# Patient Record
Sex: FEMALE | Race: BLACK OR AFRICAN AMERICAN | NOT HISPANIC OR LATINO | URBAN - METROPOLITAN AREA
[De-identification: names, ages, dates, MRNs, and addresses within clinical notes are randomized per-mention and may not be internally consistent; named-entity substitution may affect disease eponyms.]

---

## 2017-01-18 ENCOUNTER — COMMUNICATION - HEALTHEAST (OUTPATIENT)
Dept: INTERNAL MEDICINE | Facility: CLINIC | Age: 62
End: 2017-01-18

## 2017-01-23 ENCOUNTER — RECORDS - HEALTHEAST (OUTPATIENT)
Dept: ADMINISTRATIVE | Facility: OTHER | Age: 62
End: 2017-01-23

## 2017-01-31 ENCOUNTER — COMMUNICATION - HEALTHEAST (OUTPATIENT)
Dept: INTERNAL MEDICINE | Facility: CLINIC | Age: 62
End: 2017-01-31

## 2017-01-31 ENCOUNTER — OFFICE VISIT - HEALTHEAST (OUTPATIENT)
Dept: INTERNAL MEDICINE | Facility: CLINIC | Age: 62
End: 2017-01-31

## 2017-01-31 DIAGNOSIS — J20.9 SUBACUTE BRONCHITIS: ICD-10-CM

## 2017-01-31 DIAGNOSIS — R05.9 COUGH: ICD-10-CM

## 2017-01-31 ASSESSMENT — MIFFLIN-ST. JEOR: SCORE: 971.33

## 2017-05-31 ENCOUNTER — COMMUNICATION - HEALTHEAST (OUTPATIENT)
Dept: INTERNAL MEDICINE | Facility: CLINIC | Age: 62
End: 2017-05-31

## 2017-06-26 ENCOUNTER — OFFICE VISIT - HEALTHEAST (OUTPATIENT)
Dept: INTERNAL MEDICINE | Facility: CLINIC | Age: 62
End: 2017-06-26

## 2017-06-26 DIAGNOSIS — L98.9 SKIN LESION: ICD-10-CM

## 2017-06-26 DIAGNOSIS — R09.81 NASAL CONGESTION: ICD-10-CM

## 2017-06-26 DIAGNOSIS — M54.9 BACK PAIN: ICD-10-CM

## 2017-06-26 ASSESSMENT — MIFFLIN-ST. JEOR: SCORE: 966.8

## 2017-06-27 ENCOUNTER — COMMUNICATION - HEALTHEAST (OUTPATIENT)
Dept: INTERNAL MEDICINE | Facility: CLINIC | Age: 62
End: 2017-06-27

## 2017-06-28 ENCOUNTER — COMMUNICATION - HEALTHEAST (OUTPATIENT)
Dept: INTERNAL MEDICINE | Facility: CLINIC | Age: 62
End: 2017-06-28

## 2017-06-29 ENCOUNTER — COMMUNICATION - HEALTHEAST (OUTPATIENT)
Dept: INTERNAL MEDICINE | Facility: CLINIC | Age: 62
End: 2017-06-29

## 2017-07-05 ENCOUNTER — HOSPITAL ENCOUNTER (OUTPATIENT)
Dept: MRI IMAGING | Facility: CLINIC | Age: 62
Discharge: HOME | End: 2017-07-05
Attending: INTERNAL MEDICINE

## 2017-07-05 ENCOUNTER — COMMUNICATION - HEALTHEAST (OUTPATIENT)
Dept: INTERNAL MEDICINE | Facility: CLINIC | Age: 62
End: 2017-07-05

## 2017-07-05 DIAGNOSIS — M54.9 BACK PAIN: ICD-10-CM

## 2017-07-06 ENCOUNTER — AMBULATORY - HEALTHEAST (OUTPATIENT)
Dept: INTERNAL MEDICINE | Facility: CLINIC | Age: 62
End: 2017-07-06

## 2017-07-06 DIAGNOSIS — M54.9 BACK PAIN: ICD-10-CM

## 2017-07-10 ENCOUNTER — HOSPITAL ENCOUNTER (OUTPATIENT)
Dept: PHYSICAL MEDICINE AND REHAB | Facility: CLINIC | Age: 62
Discharge: HOME | End: 2017-07-10
Attending: INTERNAL MEDICINE

## 2017-07-10 DIAGNOSIS — G89.29 CHRONIC RIGHT SI JOINT PAIN: ICD-10-CM

## 2017-07-10 DIAGNOSIS — M79.18 MYOFASCIAL PAIN: ICD-10-CM

## 2017-07-10 DIAGNOSIS — M54.16 LUMBAR RADICULITIS: ICD-10-CM

## 2017-07-10 DIAGNOSIS — M54.50 LUMBALGIA: ICD-10-CM

## 2017-07-10 DIAGNOSIS — M53.3 CHRONIC RIGHT SI JOINT PAIN: ICD-10-CM

## 2017-07-10 DIAGNOSIS — G47.9 SLEEP DISTURBANCE: ICD-10-CM

## 2017-07-10 ASSESSMENT — MIFFLIN-ST. JEOR: SCORE: 970.88

## 2017-07-11 ENCOUNTER — RECORDS - HEALTHEAST (OUTPATIENT)
Dept: ADMINISTRATIVE | Facility: OTHER | Age: 62
End: 2017-07-11

## 2017-07-19 ENCOUNTER — COMMUNICATION - HEALTHEAST (OUTPATIENT)
Dept: TELEHEALTH | Facility: CLINIC | Age: 62
End: 2017-07-19

## 2017-07-19 ENCOUNTER — HOSPITAL ENCOUNTER (OUTPATIENT)
Dept: MAMMOGRAPHY | Facility: CLINIC | Age: 62
Discharge: HOME | End: 2017-07-19
Attending: INTERNAL MEDICINE

## 2017-07-19 ENCOUNTER — COMMUNICATION - HEALTHEAST (OUTPATIENT)
Dept: INTERNAL MEDICINE | Facility: CLINIC | Age: 62
End: 2017-07-19

## 2017-07-19 DIAGNOSIS — Z12.31 VISIT FOR SCREENING MAMMOGRAM: ICD-10-CM

## 2017-07-25 ENCOUNTER — OFFICE VISIT - HEALTHEAST (OUTPATIENT)
Dept: PHYSICAL THERAPY | Facility: REHABILITATION | Age: 62
End: 2017-07-25

## 2017-07-25 DIAGNOSIS — M53.86 DECREASED ROM OF LUMBAR SPINE: ICD-10-CM

## 2017-07-25 DIAGNOSIS — M54.41 ACUTE RIGHT-SIDED LOW BACK PAIN WITH RIGHT-SIDED SCIATICA: ICD-10-CM

## 2017-08-02 ENCOUNTER — OFFICE VISIT - HEALTHEAST (OUTPATIENT)
Dept: PHYSICAL THERAPY | Facility: REHABILITATION | Age: 62
End: 2017-08-02

## 2017-08-02 DIAGNOSIS — M53.86 DECREASED ROM OF LUMBAR SPINE: ICD-10-CM

## 2017-08-02 DIAGNOSIS — M54.41 ACUTE RIGHT-SIDED LOW BACK PAIN WITH RIGHT-SIDED SCIATICA: ICD-10-CM

## 2017-08-07 ENCOUNTER — HOSPITAL ENCOUNTER (OUTPATIENT)
Dept: PHYSICAL MEDICINE AND REHAB | Facility: CLINIC | Age: 62
Discharge: HOME | End: 2017-08-07
Attending: PHYSICIAN ASSISTANT

## 2017-08-07 ENCOUNTER — COMMUNICATION - HEALTHEAST (OUTPATIENT)
Dept: PHYSICAL MEDICINE AND REHAB | Facility: CLINIC | Age: 62
End: 2017-08-07

## 2017-08-07 DIAGNOSIS — M53.3 CHRONIC RIGHT SI JOINT PAIN: ICD-10-CM

## 2017-08-07 DIAGNOSIS — M54.16 LUMBAR RADICULITIS: ICD-10-CM

## 2017-08-07 DIAGNOSIS — G47.9 SLEEP DISTURBANCE: ICD-10-CM

## 2017-08-07 DIAGNOSIS — G89.29 CHRONIC RIGHT SI JOINT PAIN: ICD-10-CM

## 2017-08-07 DIAGNOSIS — M79.18 MYOFASCIAL PAIN: ICD-10-CM

## 2017-08-07 DIAGNOSIS — M54.50 LUMBALGIA: ICD-10-CM

## 2017-08-07 ASSESSMENT — MIFFLIN-ST. JEOR: SCORE: 965.89

## 2017-09-07 ENCOUNTER — COMMUNICATION - HEALTHEAST (OUTPATIENT)
Dept: INTERNAL MEDICINE | Facility: CLINIC | Age: 62
End: 2017-09-07

## 2018-01-25 ENCOUNTER — COMMUNICATION - HEALTHEAST (OUTPATIENT)
Dept: INTERNAL MEDICINE | Facility: CLINIC | Age: 63
End: 2018-01-25

## 2018-01-25 ENCOUNTER — OFFICE VISIT - HEALTHEAST (OUTPATIENT)
Dept: INTERNAL MEDICINE | Facility: CLINIC | Age: 63
End: 2018-01-25

## 2018-01-25 DIAGNOSIS — M54.9 BACK PAIN: ICD-10-CM

## 2018-01-25 DIAGNOSIS — R10.30 GROIN PAIN: ICD-10-CM

## 2018-01-25 ASSESSMENT — MIFFLIN-ST. JEOR: SCORE: 980.41

## 2018-01-29 ENCOUNTER — COMMUNICATION - HEALTHEAST (OUTPATIENT)
Dept: INTERNAL MEDICINE | Facility: CLINIC | Age: 63
End: 2018-01-29

## 2018-01-29 ENCOUNTER — HOSPITAL ENCOUNTER (OUTPATIENT)
Dept: PHYSICAL MEDICINE AND REHAB | Facility: CLINIC | Age: 63
Discharge: HOME | End: 2018-01-29
Attending: PHYSICIAN ASSISTANT

## 2018-01-29 DIAGNOSIS — M54.16 LUMBAR RADICULITIS: ICD-10-CM

## 2018-01-29 DIAGNOSIS — M79.18 MYOFASCIAL PAIN: ICD-10-CM

## 2018-01-29 DIAGNOSIS — G89.29 CHRONIC RIGHT SI JOINT PAIN: ICD-10-CM

## 2018-01-29 DIAGNOSIS — M54.50 LUMBALGIA: ICD-10-CM

## 2018-01-29 DIAGNOSIS — M53.3 CHRONIC RIGHT SI JOINT PAIN: ICD-10-CM

## 2018-01-31 ENCOUNTER — COMMUNICATION - HEALTHEAST (OUTPATIENT)
Dept: SCHEDULING | Facility: CLINIC | Age: 63
End: 2018-01-31

## 2018-02-06 ENCOUNTER — HOSPITAL ENCOUNTER (OUTPATIENT)
Dept: PHYSICAL MEDICINE AND REHAB | Facility: CLINIC | Age: 63
Discharge: HOME | End: 2018-02-06
Attending: PHYSICIAN ASSISTANT

## 2018-02-06 DIAGNOSIS — M79.18 MYOFASCIAL PAIN: ICD-10-CM

## 2018-02-06 DIAGNOSIS — M54.16 LUMBAR RADICULITIS: ICD-10-CM

## 2018-02-06 DIAGNOSIS — G89.29 CHRONIC RIGHT SI JOINT PAIN: ICD-10-CM

## 2018-02-06 DIAGNOSIS — M54.50 LUMBALGIA: ICD-10-CM

## 2018-02-06 DIAGNOSIS — M53.3 CHRONIC RIGHT SI JOINT PAIN: ICD-10-CM

## 2018-02-19 ENCOUNTER — COMMUNICATION - HEALTHEAST (OUTPATIENT)
Dept: PHYSICAL MEDICINE AND REHAB | Facility: CLINIC | Age: 63
End: 2018-02-19

## 2018-02-19 ENCOUNTER — HOSPITAL ENCOUNTER (OUTPATIENT)
Dept: PHYSICAL MEDICINE AND REHAB | Facility: CLINIC | Age: 63
Discharge: HOME | End: 2018-02-19
Attending: PHYSICIAN ASSISTANT

## 2018-02-19 DIAGNOSIS — G47.9 SLEEP DISTURBANCE: ICD-10-CM

## 2018-02-19 DIAGNOSIS — M54.16 LUMBAR RADICULITIS: ICD-10-CM

## 2018-02-19 DIAGNOSIS — G89.29 CHRONIC RIGHT SI JOINT PAIN: ICD-10-CM

## 2018-02-19 DIAGNOSIS — M79.18 MYOFASCIAL PAIN: ICD-10-CM

## 2018-02-19 DIAGNOSIS — M54.50 LUMBALGIA: ICD-10-CM

## 2018-02-19 DIAGNOSIS — M53.3 CHRONIC RIGHT SI JOINT PAIN: ICD-10-CM

## 2018-02-19 ASSESSMENT — MIFFLIN-ST. JEOR: SCORE: 980.41

## 2018-02-21 ENCOUNTER — OFFICE VISIT - HEALTHEAST (OUTPATIENT)
Dept: PHYSICAL THERAPY | Facility: REHABILITATION | Age: 63
End: 2018-02-21

## 2018-02-21 DIAGNOSIS — M62.81 GENERALIZED MUSCLE WEAKNESS: ICD-10-CM

## 2018-02-21 DIAGNOSIS — M25.552 BILATERAL HIP PAIN: ICD-10-CM

## 2018-02-21 DIAGNOSIS — M25.551 BILATERAL HIP PAIN: ICD-10-CM

## 2018-02-21 DIAGNOSIS — M53.3 PAIN OF RIGHT SACROILIAC JOINT: ICD-10-CM

## 2018-02-28 ENCOUNTER — OFFICE VISIT - HEALTHEAST (OUTPATIENT)
Dept: PHYSICAL THERAPY | Facility: REHABILITATION | Age: 63
End: 2018-02-28

## 2018-02-28 DIAGNOSIS — M62.81 GENERALIZED MUSCLE WEAKNESS: ICD-10-CM

## 2018-02-28 DIAGNOSIS — M54.41 ACUTE RIGHT-SIDED LOW BACK PAIN WITH RIGHT-SIDED SCIATICA: ICD-10-CM

## 2018-02-28 DIAGNOSIS — M53.86 DECREASED ROM OF LUMBAR SPINE: ICD-10-CM

## 2018-02-28 DIAGNOSIS — M25.551 BILATERAL HIP PAIN: ICD-10-CM

## 2018-02-28 DIAGNOSIS — M25.552 BILATERAL HIP PAIN: ICD-10-CM

## 2018-02-28 DIAGNOSIS — M53.3 PAIN OF RIGHT SACROILIAC JOINT: ICD-10-CM

## 2018-03-13 ENCOUNTER — OFFICE VISIT - HEALTHEAST (OUTPATIENT)
Dept: PHYSICAL THERAPY | Facility: REHABILITATION | Age: 63
End: 2018-03-13

## 2018-03-13 DIAGNOSIS — M25.552 BILATERAL HIP PAIN: ICD-10-CM

## 2018-03-13 DIAGNOSIS — M53.3 PAIN OF RIGHT SACROILIAC JOINT: ICD-10-CM

## 2018-03-13 DIAGNOSIS — M25.551 BILATERAL HIP PAIN: ICD-10-CM

## 2018-03-13 DIAGNOSIS — M53.86 DECREASED ROM OF LUMBAR SPINE: ICD-10-CM

## 2018-03-13 DIAGNOSIS — M62.81 GENERALIZED MUSCLE WEAKNESS: ICD-10-CM

## 2018-03-13 DIAGNOSIS — M54.41 ACUTE RIGHT-SIDED LOW BACK PAIN WITH RIGHT-SIDED SCIATICA: ICD-10-CM

## 2018-03-15 ENCOUNTER — OFFICE VISIT - HEALTHEAST (OUTPATIENT)
Dept: PHYSICAL THERAPY | Facility: REHABILITATION | Age: 63
End: 2018-03-15

## 2018-03-15 DIAGNOSIS — M62.81 GENERALIZED MUSCLE WEAKNESS: ICD-10-CM

## 2018-03-15 DIAGNOSIS — M53.3 PAIN OF RIGHT SACROILIAC JOINT: ICD-10-CM

## 2018-03-15 DIAGNOSIS — M53.86 DECREASED ROM OF LUMBAR SPINE: ICD-10-CM

## 2018-03-15 DIAGNOSIS — M54.41 ACUTE RIGHT-SIDED LOW BACK PAIN WITH RIGHT-SIDED SCIATICA: ICD-10-CM

## 2018-03-15 DIAGNOSIS — M25.551 BILATERAL HIP PAIN: ICD-10-CM

## 2018-03-15 DIAGNOSIS — M25.552 BILATERAL HIP PAIN: ICD-10-CM

## 2018-03-20 ENCOUNTER — OFFICE VISIT - HEALTHEAST (OUTPATIENT)
Dept: PHYSICAL THERAPY | Facility: REHABILITATION | Age: 63
End: 2018-03-20

## 2018-03-20 DIAGNOSIS — M62.81 GENERALIZED MUSCLE WEAKNESS: ICD-10-CM

## 2018-03-20 DIAGNOSIS — M53.3 PAIN OF RIGHT SACROILIAC JOINT: ICD-10-CM

## 2018-03-20 DIAGNOSIS — M25.551 BILATERAL HIP PAIN: ICD-10-CM

## 2018-03-20 DIAGNOSIS — M25.552 BILATERAL HIP PAIN: ICD-10-CM

## 2018-03-20 DIAGNOSIS — M53.86 DECREASED ROM OF LUMBAR SPINE: ICD-10-CM

## 2018-03-20 DIAGNOSIS — M54.41 ACUTE RIGHT-SIDED LOW BACK PAIN WITH RIGHT-SIDED SCIATICA: ICD-10-CM

## 2018-03-27 ENCOUNTER — OFFICE VISIT - HEALTHEAST (OUTPATIENT)
Dept: PHYSICAL THERAPY | Facility: REHABILITATION | Age: 63
End: 2018-03-27

## 2018-03-27 DIAGNOSIS — M54.41 ACUTE RIGHT-SIDED LOW BACK PAIN WITH RIGHT-SIDED SCIATICA: ICD-10-CM

## 2018-03-27 DIAGNOSIS — M25.551 BILATERAL HIP PAIN: ICD-10-CM

## 2018-03-27 DIAGNOSIS — M62.81 GENERALIZED MUSCLE WEAKNESS: ICD-10-CM

## 2018-03-27 DIAGNOSIS — M25.552 BILATERAL HIP PAIN: ICD-10-CM

## 2018-03-27 DIAGNOSIS — R92.8 ABNORMAL MAMMOGRAM: ICD-10-CM

## 2018-03-27 DIAGNOSIS — M53.3 PAIN OF RIGHT SACROILIAC JOINT: ICD-10-CM

## 2018-03-27 DIAGNOSIS — M53.86 DECREASED ROM OF LUMBAR SPINE: ICD-10-CM

## 2018-10-03 ENCOUNTER — COMMUNICATION - HEALTHEAST (OUTPATIENT)
Dept: INTERNAL MEDICINE | Facility: CLINIC | Age: 63
End: 2018-10-03

## 2018-10-04 ENCOUNTER — OFFICE VISIT - HEALTHEAST (OUTPATIENT)
Dept: INTERNAL MEDICINE | Facility: CLINIC | Age: 63
End: 2018-10-04

## 2018-10-04 DIAGNOSIS — Z12.11 SCREEN FOR COLON CANCER: ICD-10-CM

## 2018-10-04 DIAGNOSIS — Z00.00 HEALTHCARE MAINTENANCE: ICD-10-CM

## 2018-10-04 DIAGNOSIS — M54.9 BACK PAIN: ICD-10-CM

## 2018-10-04 LAB
ALBUMIN SERPL-MCNC: 3.7 G/DL (ref 3.5–5)
ALP SERPL-CCNC: 69 U/L (ref 45–120)
ALT SERPL W P-5'-P-CCNC: 23 U/L (ref 0–45)
ANION GAP SERPL CALCULATED.3IONS-SCNC: 9 MMOL/L (ref 5–18)
AST SERPL W P-5'-P-CCNC: 36 U/L (ref 0–40)
BILIRUB SERPL-MCNC: 0.7 MG/DL (ref 0–1)
BUN SERPL-MCNC: 10 MG/DL (ref 8–22)
CALCIUM SERPL-MCNC: 9.2 MG/DL (ref 8.5–10.5)
CHLORIDE BLD-SCNC: 104 MMOL/L (ref 98–107)
CHOLEST SERPL-MCNC: 184 MG/DL
CO2 SERPL-SCNC: 27 MMOL/L (ref 22–31)
CREAT SERPL-MCNC: 0.72 MG/DL (ref 0.6–1.1)
FASTING STATUS PATIENT QL REPORTED: NORMAL
GFR SERPL CREATININE-BSD FRML MDRD: >60 ML/MIN/1.73M2
GLUCOSE BLD-MCNC: 73 MG/DL (ref 70–125)
HDLC SERPL-MCNC: 86 MG/DL
LDLC SERPL CALC-MCNC: 89 MG/DL
POTASSIUM BLD-SCNC: 4 MMOL/L (ref 3.5–5)
PROT SERPL-MCNC: 7.3 G/DL (ref 6–8)
SODIUM SERPL-SCNC: 140 MMOL/L (ref 136–145)
TRIGL SERPL-MCNC: 45 MG/DL

## 2018-10-04 ASSESSMENT — MIFFLIN-ST. JEOR: SCORE: 940.72

## 2018-10-05 ENCOUNTER — COMMUNICATION - HEALTHEAST (OUTPATIENT)
Dept: INTERNAL MEDICINE | Facility: CLINIC | Age: 63
End: 2018-10-05

## 2018-10-10 ENCOUNTER — OFFICE VISIT - HEALTHEAST (OUTPATIENT)
Dept: PHYSICAL THERAPY | Facility: REHABILITATION | Age: 63
End: 2018-10-10

## 2018-10-10 DIAGNOSIS — M54.50 ACUTE RIGHT-SIDED LOW BACK PAIN WITHOUT SCIATICA: ICD-10-CM

## 2018-10-10 DIAGNOSIS — M62.81 GENERALIZED MUSCLE WEAKNESS: ICD-10-CM

## 2018-10-10 DIAGNOSIS — M53.3 SACROILIAC JOINT PAIN: ICD-10-CM

## 2018-10-16 ENCOUNTER — OFFICE VISIT - HEALTHEAST (OUTPATIENT)
Dept: PHYSICAL THERAPY | Facility: REHABILITATION | Age: 63
End: 2018-10-16

## 2018-10-16 DIAGNOSIS — M54.50 ACUTE RIGHT-SIDED LOW BACK PAIN WITHOUT SCIATICA: ICD-10-CM

## 2018-10-16 DIAGNOSIS — M53.3 PAIN OF RIGHT SACROILIAC JOINT: ICD-10-CM

## 2018-10-16 DIAGNOSIS — M25.552 BILATERAL HIP PAIN: ICD-10-CM

## 2018-10-16 DIAGNOSIS — M25.551 BILATERAL HIP PAIN: ICD-10-CM

## 2018-10-16 DIAGNOSIS — M53.3 SACROILIAC JOINT PAIN: ICD-10-CM

## 2018-10-16 DIAGNOSIS — M62.81 GENERALIZED MUSCLE WEAKNESS: ICD-10-CM

## 2018-10-18 ENCOUNTER — OFFICE VISIT - HEALTHEAST (OUTPATIENT)
Dept: PHYSICAL THERAPY | Facility: REHABILITATION | Age: 63
End: 2018-10-18

## 2018-10-18 DIAGNOSIS — M53.3 SACROILIAC JOINT PAIN: ICD-10-CM

## 2018-10-18 DIAGNOSIS — M62.81 GENERALIZED MUSCLE WEAKNESS: ICD-10-CM

## 2018-10-18 DIAGNOSIS — M54.50 ACUTE RIGHT-SIDED LOW BACK PAIN WITHOUT SCIATICA: ICD-10-CM

## 2018-10-19 ENCOUNTER — AMBULATORY - HEALTHEAST (OUTPATIENT)
Dept: INTERNAL MEDICINE | Facility: CLINIC | Age: 63
End: 2018-10-19

## 2018-10-19 ENCOUNTER — AMBULATORY - HEALTHEAST (OUTPATIENT)
Dept: NURSING | Facility: CLINIC | Age: 63
End: 2018-10-19

## 2018-10-19 DIAGNOSIS — Z23 FLU VACCINE NEED: ICD-10-CM

## 2018-10-19 DIAGNOSIS — Z23 NEED FOR VACCINATION: ICD-10-CM

## 2018-10-23 ENCOUNTER — OFFICE VISIT - HEALTHEAST (OUTPATIENT)
Dept: PHYSICAL THERAPY | Facility: REHABILITATION | Age: 63
End: 2018-10-23

## 2018-10-23 DIAGNOSIS — M62.81 GENERALIZED MUSCLE WEAKNESS: ICD-10-CM

## 2018-10-23 DIAGNOSIS — M53.3 PAIN OF RIGHT SACROILIAC JOINT: ICD-10-CM

## 2018-10-23 DIAGNOSIS — M54.50 ACUTE RIGHT-SIDED LOW BACK PAIN WITHOUT SCIATICA: ICD-10-CM

## 2018-10-29 ENCOUNTER — OFFICE VISIT - HEALTHEAST (OUTPATIENT)
Dept: PHYSICAL THERAPY | Facility: REHABILITATION | Age: 63
End: 2018-10-29

## 2018-10-29 DIAGNOSIS — M62.81 GENERALIZED MUSCLE WEAKNESS: ICD-10-CM

## 2018-10-29 DIAGNOSIS — M53.3 PAIN OF RIGHT SACROILIAC JOINT: ICD-10-CM

## 2018-10-29 DIAGNOSIS — M54.50 ACUTE RIGHT-SIDED LOW BACK PAIN WITHOUT SCIATICA: ICD-10-CM

## 2018-11-01 ENCOUNTER — COMMUNICATION - HEALTHEAST (OUTPATIENT)
Dept: INTERNAL MEDICINE | Facility: CLINIC | Age: 63
End: 2018-11-01

## 2019-01-09 ENCOUNTER — OFFICE VISIT - HEALTHEAST (OUTPATIENT)
Dept: INTERNAL MEDICINE | Facility: CLINIC | Age: 64
End: 2019-01-09

## 2019-01-09 DIAGNOSIS — M79.18 MYOFASCIAL PAIN: ICD-10-CM

## 2019-01-09 DIAGNOSIS — R03.0 ELEVATED BLOOD PRESSURE READING WITHOUT DIAGNOSIS OF HYPERTENSION: ICD-10-CM

## 2019-01-09 DIAGNOSIS — J30.2 SEASONAL ALLERGIES: ICD-10-CM

## 2019-01-09 DIAGNOSIS — M54.16 LUMBAR RADICULITIS: ICD-10-CM

## 2019-01-09 DIAGNOSIS — M25.512 ACUTE PAIN OF LEFT SHOULDER: ICD-10-CM

## 2019-01-09 DIAGNOSIS — G47.9 SLEEP DISTURBANCE: ICD-10-CM

## 2019-01-09 ASSESSMENT — MIFFLIN-ST. JEOR: SCORE: 972.47

## 2019-01-12 ENCOUNTER — COMMUNICATION - HEALTHEAST (OUTPATIENT)
Dept: INTERNAL MEDICINE | Facility: CLINIC | Age: 64
End: 2019-01-12

## 2019-01-23 ENCOUNTER — OFFICE VISIT - HEALTHEAST (OUTPATIENT)
Dept: INTERNAL MEDICINE | Facility: CLINIC | Age: 64
End: 2019-01-23

## 2019-01-23 DIAGNOSIS — M25.512 LEFT SHOULDER PAIN, UNSPECIFIED CHRONICITY: ICD-10-CM

## 2019-01-23 DIAGNOSIS — R03.0 ELEVATED BLOOD PRESSURE READING WITHOUT DIAGNOSIS OF HYPERTENSION: ICD-10-CM

## 2019-01-23 ASSESSMENT — MIFFLIN-ST. JEOR: SCORE: 972.47

## 2019-04-01 ENCOUNTER — RECORDS - HEALTHEAST (OUTPATIENT)
Dept: ADMINISTRATIVE | Facility: OTHER | Age: 64
End: 2019-04-01

## 2019-08-21 ENCOUNTER — AMBULATORY - HEALTHEAST (OUTPATIENT)
Dept: INTERNAL MEDICINE | Facility: CLINIC | Age: 64
End: 2019-08-21

## 2019-08-21 ENCOUNTER — OFFICE VISIT - HEALTHEAST (OUTPATIENT)
Dept: INTERNAL MEDICINE | Facility: CLINIC | Age: 64
End: 2019-08-21

## 2019-08-21 DIAGNOSIS — R22.32 LOCALIZED SWELLING, MASS AND LUMP, LEFT UPPER LIMB: ICD-10-CM

## 2019-08-21 DIAGNOSIS — Z12.31 VISIT FOR SCREENING MAMMOGRAM: ICD-10-CM

## 2019-08-21 ASSESSMENT — MIFFLIN-ST. JEOR: SCORE: 940.72

## 2019-08-27 ENCOUNTER — HOSPITAL ENCOUNTER (OUTPATIENT)
Dept: MAMMOGRAPHY | Facility: CLINIC | Age: 64
Discharge: HOME | End: 2019-08-27
Attending: INTERNAL MEDICINE

## 2019-08-27 DIAGNOSIS — Z12.31 VISIT FOR SCREENING MAMMOGRAM: ICD-10-CM

## 2019-09-30 ENCOUNTER — COMMUNICATION - HEALTHEAST (OUTPATIENT)
Dept: INTERNAL MEDICINE | Facility: CLINIC | Age: 64
End: 2019-09-30

## 2019-09-30 ENCOUNTER — AMBULATORY - HEALTHEAST (OUTPATIENT)
Dept: NURSING | Facility: CLINIC | Age: 64
End: 2019-09-30

## 2019-09-30 ENCOUNTER — AMBULATORY - HEALTHEAST (OUTPATIENT)
Dept: INTERNAL MEDICINE | Facility: CLINIC | Age: 64
End: 2019-09-30

## 2019-09-30 DIAGNOSIS — Z23 NEED FOR VACCINATION: ICD-10-CM

## 2019-09-30 DIAGNOSIS — Z23 FLU VACCINE NEED: ICD-10-CM

## 2020-01-10 ENCOUNTER — OFFICE VISIT - HEALTHEAST (OUTPATIENT)
Dept: INTERNAL MEDICINE | Facility: CLINIC | Age: 65
End: 2020-01-10

## 2020-01-10 DIAGNOSIS — M79.672 LEFT FOOT PAIN: ICD-10-CM

## 2020-03-04 ENCOUNTER — COMMUNICATION - HEALTHEAST (OUTPATIENT)
Dept: SCHEDULING | Facility: CLINIC | Age: 65
End: 2020-03-04

## 2020-03-04 ENCOUNTER — COMMUNICATION - HEALTHEAST (OUTPATIENT)
Dept: INTERNAL MEDICINE | Facility: CLINIC | Age: 65
End: 2020-03-04

## 2020-03-04 DIAGNOSIS — J06.9 UPPER RESPIRATORY TRACT INFECTION, UNSPECIFIED TYPE: ICD-10-CM

## 2020-04-30 ENCOUNTER — COMMUNICATION - HEALTHEAST (OUTPATIENT)
Dept: INTERNAL MEDICINE | Facility: CLINIC | Age: 65
End: 2020-04-30

## 2020-04-30 DIAGNOSIS — J01.90 ACUTE SINUSITIS, RECURRENCE NOT SPECIFIED, UNSPECIFIED LOCATION: ICD-10-CM

## 2020-08-03 ENCOUNTER — COMMUNICATION - HEALTHEAST (OUTPATIENT)
Dept: INTERNAL MEDICINE | Facility: CLINIC | Age: 65
End: 2020-08-03

## 2020-08-03 DIAGNOSIS — J06.9 UPPER RESPIRATORY TRACT INFECTION, UNSPECIFIED TYPE: ICD-10-CM

## 2020-10-14 ENCOUNTER — AMBULATORY - HEALTHEAST (OUTPATIENT)
Dept: NURSING | Facility: CLINIC | Age: 65
End: 2020-10-14

## 2020-11-20 ENCOUNTER — OFFICE VISIT - HEALTHEAST (OUTPATIENT)
Dept: INTERNAL MEDICINE | Facility: CLINIC | Age: 65
End: 2020-11-20

## 2020-11-20 DIAGNOSIS — N39.41 URGE INCONTINENCE OF URINE: ICD-10-CM

## 2020-11-20 DIAGNOSIS — M81.0 AGE-RELATED OSTEOPOROSIS WITHOUT CURRENT PATHOLOGICAL FRACTURE: ICD-10-CM

## 2020-11-20 DIAGNOSIS — Z12.11 ENCOUNTER FOR COLONOSCOPY DUE TO HISTORY OF COLON CANCER: ICD-10-CM

## 2020-11-20 DIAGNOSIS — R76.8 HEPATITIS C ANTIBODY POSITIVE IN BLOOD: ICD-10-CM

## 2020-11-20 DIAGNOSIS — Z85.038 ENCOUNTER FOR COLONOSCOPY DUE TO HISTORY OF COLON CANCER: ICD-10-CM

## 2020-11-20 DIAGNOSIS — S46.002A ROTATOR CUFF INJURY, LEFT, INITIAL ENCOUNTER: ICD-10-CM

## 2020-11-20 DIAGNOSIS — Z71.6 ENCOUNTER FOR TOBACCO USE CESSATION COUNSELING: ICD-10-CM

## 2020-11-20 DIAGNOSIS — E78.2 MIXED HYPERLIPIDEMIA: ICD-10-CM

## 2020-11-20 DIAGNOSIS — R03.0 ELEVATED BP WITHOUT DIAGNOSIS OF HYPERTENSION: ICD-10-CM

## 2020-11-20 DIAGNOSIS — Z12.31 ENCOUNTER FOR SCREENING MAMMOGRAM FOR BREAST CANCER: ICD-10-CM

## 2020-11-20 LAB
ALBUMIN SERPL-MCNC: 4.1 G/DL (ref 3.5–5)
ALBUMIN UR-MCNC: NEGATIVE MG/DL
ALP SERPL-CCNC: 70 U/L (ref 45–120)
ALT SERPL W P-5'-P-CCNC: 23 U/L (ref 0–45)
ANION GAP SERPL CALCULATED.3IONS-SCNC: 9 MMOL/L (ref 5–18)
APPEARANCE UR: CLEAR
AST SERPL W P-5'-P-CCNC: 30 U/L (ref 0–40)
BACTERIA #/AREA URNS HPF: ABNORMAL HPF
BASOPHILS # BLD AUTO: 0.1 THOU/UL (ref 0–0.2)
BASOPHILS NFR BLD AUTO: 1 % (ref 0–2)
BILIRUB SERPL-MCNC: 0.6 MG/DL (ref 0–1)
BILIRUB UR QL STRIP: NEGATIVE
BUN SERPL-MCNC: 12 MG/DL (ref 8–22)
CALCIUM SERPL-MCNC: 9.7 MG/DL (ref 8.5–10.5)
CHLORIDE BLD-SCNC: 103 MMOL/L (ref 98–107)
CHOLEST SERPL-MCNC: 202 MG/DL
CO2 SERPL-SCNC: 28 MMOL/L (ref 22–31)
COLOR UR AUTO: YELLOW
CREAT SERPL-MCNC: 0.82 MG/DL (ref 0.6–1.1)
EOSINOPHIL # BLD AUTO: 0.1 THOU/UL (ref 0–0.4)
EOSINOPHIL NFR BLD AUTO: 1 % (ref 0–6)
ERYTHROCYTE [DISTWIDTH] IN BLOOD BY AUTOMATED COUNT: 10.7 % (ref 11–14.5)
FASTING STATUS PATIENT QL REPORTED: ABNORMAL
GFR SERPL CREATININE-BSD FRML MDRD: >60 ML/MIN/1.73M2
GLUCOSE BLD-MCNC: 87 MG/DL (ref 70–125)
GLUCOSE UR STRIP-MCNC: NEGATIVE MG/DL
HBA1C MFR BLD: 5.1 %
HCT VFR BLD AUTO: 47 % (ref 35–47)
HDLC SERPL-MCNC: 90 MG/DL
HGB BLD-MCNC: 15.9 G/DL (ref 12–16)
HGB UR QL STRIP: ABNORMAL
KETONES UR STRIP-MCNC: NEGATIVE MG/DL
LDLC SERPL CALC-MCNC: 97 MG/DL
LEUKOCYTE ESTERASE UR QL STRIP: ABNORMAL
LYMPHOCYTES # BLD AUTO: 3.5 THOU/UL (ref 0.8–4.4)
LYMPHOCYTES NFR BLD AUTO: 35 % (ref 20–40)
MCH RBC QN AUTO: 34.9 PG (ref 27–34)
MCHC RBC AUTO-ENTMCNC: 33.7 G/DL (ref 32–36)
MCV RBC AUTO: 103 FL (ref 80–100)
MONOCYTES # BLD AUTO: 0.4 THOU/UL (ref 0–0.9)
MONOCYTES NFR BLD AUTO: 4 % (ref 2–10)
NEUTROPHILS # BLD AUTO: 5.9 THOU/UL (ref 2–7.7)
NEUTROPHILS NFR BLD AUTO: 59 % (ref 50–70)
NITRATE UR QL: NEGATIVE
PH UR STRIP: 7 [PH] (ref 5–8)
PLATELET # BLD AUTO: 167 THOU/UL (ref 140–440)
PMV BLD AUTO: 8.6 FL (ref 7–10)
POTASSIUM BLD-SCNC: 4.1 MMOL/L (ref 3.5–5)
PROT SERPL-MCNC: 8 G/DL (ref 6–8)
RBC # BLD AUTO: 4.55 MILL/UL (ref 3.8–5.4)
RBC #/AREA URNS AUTO: ABNORMAL HPF
SODIUM SERPL-SCNC: 140 MMOL/L (ref 136–145)
SP GR UR STRIP: 1.02 (ref 1–1.03)
SQUAMOUS #/AREA URNS AUTO: ABNORMAL LPF
TRIGL SERPL-MCNC: 76 MG/DL
UROBILINOGEN UR STRIP-ACNC: ABNORMAL
WBC #/AREA URNS AUTO: ABNORMAL HPF
WBC: 10.1 THOU/UL (ref 4–11)

## 2020-11-20 RX ORDER — POLYETHYLENE GLYCOL 3350 17 G
2 POWDER IN PACKET (EA) ORAL PRN
Refills: 0 | Status: SHIPPED | COMMUNITY
Start: 2020-11-20 | End: 2021-09-01

## 2020-11-20 RX ORDER — VARENICLINE TARTRATE 1 MG/1
TABLET, FILM COATED ORAL
Qty: 60 TABLET | Refills: 2 | Status: SHIPPED | OUTPATIENT
Start: 2020-11-20 | End: 2021-09-01

## 2020-11-20 RX ORDER — OXYBUTYNIN CHLORIDE 5 MG/1
5 TABLET, EXTENDED RELEASE ORAL DAILY
Qty: 34 TABLET | Refills: 3 | Status: SHIPPED | OUTPATIENT
Start: 2020-11-20 | End: 2021-09-01

## 2020-11-20 ASSESSMENT — MIFFLIN-ST. JEOR: SCORE: 963.96

## 2020-11-21 LAB — BACTERIA SPEC CULT: NO GROWTH

## 2020-11-23 ENCOUNTER — COMMUNICATION - HEALTHEAST (OUTPATIENT)
Dept: INTERNAL MEDICINE | Facility: CLINIC | Age: 65
End: 2020-11-23

## 2020-11-23 DIAGNOSIS — B19.10 HEPATITIS B VIRUS INFECTION, UNSPECIFIED CHRONICITY: ICD-10-CM

## 2020-11-23 LAB
HCV AB SERPL QL IA: POSITIVE
HCV RNA SERPL NAA+PROBE-ACNC: ABNORMAL [IU]/ML
HCV RNA SERPL NAA+PROBE-LOG IU: 5.9 LOG IU/ML

## 2020-11-27 ENCOUNTER — COMMUNICATION - HEALTHEAST (OUTPATIENT)
Dept: INTERNAL MEDICINE | Facility: CLINIC | Age: 65
End: 2020-11-27

## 2020-12-21 ENCOUNTER — COMMUNICATION - HEALTHEAST (OUTPATIENT)
Dept: INTERNAL MEDICINE | Facility: CLINIC | Age: 65
End: 2020-12-21

## 2020-12-29 ENCOUNTER — RECORDS - HEALTHEAST (OUTPATIENT)
Dept: ADMINISTRATIVE | Facility: OTHER | Age: 65
End: 2020-12-29

## 2020-12-30 ENCOUNTER — COMMUNICATION - HEALTHEAST (OUTPATIENT)
Dept: INTERNAL MEDICINE | Facility: CLINIC | Age: 65
End: 2020-12-30

## 2021-01-18 ENCOUNTER — RECORDS - HEALTHEAST (OUTPATIENT)
Dept: ADMINISTRATIVE | Facility: OTHER | Age: 66
End: 2021-01-18

## 2021-01-21 ENCOUNTER — RECORDS - HEALTHEAST (OUTPATIENT)
Dept: BONE DENSITY | Facility: CLINIC | Age: 66
End: 2021-01-21

## 2021-01-21 ENCOUNTER — RECORDS - HEALTHEAST (OUTPATIENT)
Dept: ADMINISTRATIVE | Facility: OTHER | Age: 66
End: 2021-01-21

## 2021-01-21 ENCOUNTER — RECORDS - HEALTHEAST (OUTPATIENT)
Dept: MAMMOGRAPHY | Facility: CLINIC | Age: 66
End: 2021-01-21

## 2021-01-21 DIAGNOSIS — Z12.31 ENCOUNTER FOR SCREENING MAMMOGRAM FOR MALIGNANT NEOPLASM OF BREAST: ICD-10-CM

## 2021-01-21 DIAGNOSIS — M81.0 AGE-RELATED OSTEOPOROSIS WITHOUT CURRENT PATHOLOGICAL FRACTURE: ICD-10-CM

## 2021-01-26 ENCOUNTER — COMMUNICATION - HEALTHEAST (OUTPATIENT)
Dept: INTERNAL MEDICINE | Facility: CLINIC | Age: 66
End: 2021-01-26

## 2021-01-26 DIAGNOSIS — M81.0 AGE-RELATED OSTEOPOROSIS WITHOUT CURRENT PATHOLOGICAL FRACTURE: ICD-10-CM

## 2021-01-26 RX ORDER — ALENDRONATE SODIUM 70 MG/1
70 TABLET ORAL
Qty: 4 TABLET | Refills: 11 | Status: SHIPPED | OUTPATIENT
Start: 2021-01-26 | End: 2021-09-01

## 2021-01-28 ENCOUNTER — RECORDS - HEALTHEAST (OUTPATIENT)
Dept: ADMINISTRATIVE | Facility: OTHER | Age: 66
End: 2021-01-28

## 2021-01-29 ENCOUNTER — RECORDS - HEALTHEAST (OUTPATIENT)
Dept: ADMINISTRATIVE | Facility: OTHER | Age: 66
End: 2021-01-29

## 2021-02-25 ENCOUNTER — OFFICE VISIT - HEALTHEAST (OUTPATIENT)
Dept: INTERNAL MEDICINE | Facility: CLINIC | Age: 66
End: 2021-02-25

## 2021-02-25 DIAGNOSIS — B18.2 CHRONIC HEPATITIS C WITHOUT HEPATIC COMA (H): ICD-10-CM

## 2021-02-25 DIAGNOSIS — M81.0 AGE-RELATED OSTEOPOROSIS WITHOUT CURRENT PATHOLOGICAL FRACTURE: ICD-10-CM

## 2021-02-25 DIAGNOSIS — Z86.0100 HISTORY OF COLONIC POLYPS: ICD-10-CM

## 2021-02-25 ASSESSMENT — MIFFLIN-ST. JEOR: SCORE: 984.83

## 2021-03-12 ENCOUNTER — AMBULATORY - HEALTHEAST (OUTPATIENT)
Dept: NURSING | Facility: CLINIC | Age: 66
End: 2021-03-12

## 2021-04-02 ENCOUNTER — AMBULATORY - HEALTHEAST (OUTPATIENT)
Dept: NURSING | Facility: CLINIC | Age: 66
End: 2021-04-02

## 2021-05-12 ENCOUNTER — COMMUNICATION - HEALTHEAST (OUTPATIENT)
Dept: INTERNAL MEDICINE | Facility: CLINIC | Age: 66
End: 2021-05-12

## 2021-05-12 DIAGNOSIS — J01.90 ACUTE SINUSITIS, RECURRENCE NOT SPECIFIED, UNSPECIFIED LOCATION: ICD-10-CM

## 2021-05-12 RX ORDER — FEXOFENADINE HCL 180 MG/1
180 TABLET ORAL DAILY
Qty: 30 TABLET | Refills: 1 | Status: SHIPPED | OUTPATIENT
Start: 2021-05-12 | End: 2021-09-01

## 2021-05-13 ENCOUNTER — RECORDS - HEALTHEAST (OUTPATIENT)
Dept: INTERNAL MEDICINE | Facility: CLINIC | Age: 66
End: 2021-05-13

## 2021-05-14 ENCOUNTER — OFFICE VISIT - HEALTHEAST (OUTPATIENT)
Dept: INTERNAL MEDICINE | Facility: CLINIC | Age: 66
End: 2021-05-14

## 2021-05-14 DIAGNOSIS — J30.81 ALLERGIC RHINITIS DUE TO ANIMALS: ICD-10-CM

## 2021-05-30 VITALS — WEIGHT: 105 LBS | BODY MASS INDEX: 18.6 KG/M2

## 2021-05-30 VITALS — WEIGHT: 103 LBS | BODY MASS INDEX: 18.25 KG/M2 | HEIGHT: 63 IN

## 2021-05-30 VITALS — BODY MASS INDEX: 18.6 KG/M2 | HEIGHT: 63 IN

## 2021-05-31 VITALS — BODY MASS INDEX: 18.61 KG/M2 | HEIGHT: 63 IN | WEIGHT: 105 LBS

## 2021-05-31 VITALS — HEIGHT: 63 IN | BODY MASS INDEX: 18.04 KG/M2 | WEIGHT: 101.8 LBS

## 2021-05-31 VITALS — BODY MASS INDEX: 18.07 KG/M2 | WEIGHT: 102 LBS | HEIGHT: 63 IN

## 2021-05-31 VITALS — WEIGHT: 102.9 LBS | HEIGHT: 63 IN | BODY MASS INDEX: 18.23 KG/M2

## 2021-05-31 NOTE — PROGRESS NOTES
Office Visit - Follow Up   Shantelle Taveras   64 y.o. female    Date of Visit: 8/21/2019    Chief Complaint   Patient presents with     Mass     left forearm, dog rope wrapped around arm and crushed this area 8/9/19. still has a lump there.         Assessment and Plan     Subcutaneous lump on the flexural aspect of her left medial forearm, measuring about 2 x 4 cm, onset after her forearm was traumatized by a dog leash got wrapped around her arm on August 9, 2012 days ago.  I believe this lump reflects either a collection of extravasated serum or a hematoma, although I do not see the characteristic black and blue from leaked blood.    Her muscular and neurologic function is entirely intact in her left upper extremity.  She is left-handed.  The lump is not particular tender.  I see no signs of infection.  It is well demarcated and easily movable.  Is probably embedded in the subcutaneous fat, not involving muscles or tendons.    I told her that I expect this lump to resolve on its own over the next couple of weeks.  She should avoid any pressure or trauma to it.  Keeping her arm elevated might help speed up resolution.  If it is tender, she can apply a little bit of ice or cool compresses.  If ibuprofen makes her arm feel better, she can use that.    I asked her to call or come back if the arm becomes more painful, swollen, or if there are signs of infection such as fever, redness.    Due for screening mammogram, order placed.    Mildly elevated systolic blood pressure today, but she told me that she is a bit nervous because of  her client.       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  I believe this  "lump reflects either a collection of extravasated serum or a hematoma, although I do not see the characteristic black and blue from leaked blood.    She exhibits no signs or symptoms of infection.     Medications, Allergies, Social, and Problem List   Current Outpatient Medications   Medication Sig Dispense Refill     ALLER-EASE 180 mg tablet TAKE ONE TABLET BY MOUTH ONE TIME DAILY  30 tablet 1     No current facility-administered medications for this visit.      Allergies   Allergen Reactions     Codeine      Social History     Tobacco Use     Smoking status: Current Every Day Smoker     Smokeless tobacco: Never Used   Substance Use Topics     Alcohol use: Not on file     Drug use: Not on file     Patient Active Problem List   Diagnosis     Chest Pain     Otitis Media     Acute Sinusitis     Cerumen Impaction     Upper Respiratory Infection     Pneumonia     Imaging Studies Nonspecific Abnormal Findings Breast     Mammogram Inconclusive Due To Dense Breasts     Limb Pain        Reviewed, reconciled and updated       Physical Exam   General Appearance:      /68 (Patient Site: Left Arm, Patient Position: Sitting, Cuff Size: Adult Regular)   Pulse 70   Temp 98.4  F (36.9  C) (Oral)   Ht 5' 2.5\" (1.588 m)   Wt (!) 98 lb (44.5 kg)   SpO2 96%   BMI 17.64 kg/m      Muscular and neurologic function is entirely intact in her left upper extremity.     Left forearm, flexural aspect: the lump is about 2 x 4 cm and is not particularly tender.  I see no signs of infection.  It is well demarcated and easily movable.  Is probably embedded in the subcutaneous fat, not involving muscles or tendons.     Additional Information        Adrian Jay MD    "

## 2021-06-01 VITALS — HEIGHT: 63 IN | BODY MASS INDEX: 18.61 KG/M2 | WEIGHT: 105 LBS

## 2021-06-01 NOTE — TELEPHONE ENCOUNTER
Dr. Victor,  Patient is coming to the clinic today for her flu vaccine and would also like to get her 2nd pneumonia vaccine.  She had her prevnar 13 vaccine on 10/19/18.  Okay to place order for pneumo 23, or is it too early?  Please advise.  Thank you.  Kendra BROOKS CMA/GEETA....................7:13 AM

## 2021-06-02 VITALS — WEIGHT: 105 LBS | HEIGHT: 63 IN | BODY MASS INDEX: 18.61 KG/M2

## 2021-06-02 VITALS — HEIGHT: 63 IN | WEIGHT: 98 LBS | BODY MASS INDEX: 17.36 KG/M2

## 2021-06-02 VITALS — BODY MASS INDEX: 18.61 KG/M2 | HEIGHT: 63 IN | WEIGHT: 105 LBS

## 2021-06-03 VITALS — WEIGHT: 98 LBS | HEIGHT: 63 IN | BODY MASS INDEX: 17.36 KG/M2

## 2021-06-04 VITALS
WEIGHT: 100 LBS | OXYGEN SATURATION: 97 % | BODY MASS INDEX: 18 KG/M2 | SYSTOLIC BLOOD PRESSURE: 122 MMHG | DIASTOLIC BLOOD PRESSURE: 80 MMHG | HEART RATE: 78 BPM

## 2021-06-05 VITALS
HEIGHT: 62 IN | HEART RATE: 78 BPM | OXYGEN SATURATION: 97 % | DIASTOLIC BLOOD PRESSURE: 80 MMHG | WEIGHT: 108.6 LBS | BODY MASS INDEX: 19.98 KG/M2 | SYSTOLIC BLOOD PRESSURE: 128 MMHG

## 2021-06-05 VITALS
DIASTOLIC BLOOD PRESSURE: 82 MMHG | HEIGHT: 62 IN | OXYGEN SATURATION: 98 % | BODY MASS INDEX: 19.14 KG/M2 | WEIGHT: 104 LBS | HEART RATE: 72 BPM | SYSTOLIC BLOOD PRESSURE: 148 MMHG | TEMPERATURE: 98 F

## 2021-06-05 NOTE — PROGRESS NOTES
[facility] Follow Up Note    Shantelle MORTEZA Nitin   64 y.o. female    Date of Visit: 1/10/2020    Chief Complaint   Patient presents with     Foot Swelling     left, hurts at the day is over     Hand Pain     hard to bend fingers     Subjective  This is a 64-year-old lady who comes in today because of some pain in the ball of her left foot.  This started about 1 week ago.  No swelling she reports.  She tells me that she has been wearing some different shoes just prior to the onset of the discomfort there was some elevation of the heel and she seemed to be putting more pressure on the bottom of the foot.  It was after that she developed the discomfort.  Since then she has gone back to flat shoes and is applying heat and it is starting to improve.    As a secondary issue, she reports a little bit of stiffness in the third and fourth fingers of the left hand when she first gets up in the morning.  They do loosen up and function normally after that.  She is left-handed.    ROS A comprehensive review of systems was performed and was otherwise negative    Medications, allergies, and problem list were reviewed and updated    Exam  General Appearance:   On examination her blood pressure is 122/80.  Weight is 100 pounds and BMI is 18.00.    Heart rhythm is stable with rate of 78 and no ectopy.    Examination of the 2 fingers in the left hand reveal no swelling or change in skin color or temperature.  Range of motion is good.    Examination of the left foot is also unremarkable.  There is no swelling or significant tenderness.  She is able to walk on it without problems.    The patient is alert and oriented x3.      Assessment/Plan  1. Left foot pain       Left foot pain.  I suspect there could be a little bit of a neuropathic pain from putting pressure on that foot.  As she is making progress I would not x-ray it or do any other testing today.  I advised her to continue using heat and cold and to wear the  flat shoes.  She will give it a couple of weeks and if not better we will follow-up.    Finger pain.  This is probably arthritic in nature.  Again, she will follow-up if it gets any worse.      Javier Blankenship MD      Current Outpatient Medications on File Prior to Visit   Medication Sig     ALLER-EASE 180 mg tablet TAKE ONE TABLET BY MOUTH ONE TIME DAILY      No current facility-administered medications on file prior to visit.      Allergies   Allergen Reactions     Codeine      Social History     Tobacco Use     Smoking status: Current Every Day Smoker     Smokeless tobacco: Never Used   Substance Use Topics     Alcohol use: Not on file     Drug use: Not on file

## 2021-06-06 NOTE — TELEPHONE ENCOUNTER
I am not sure I understand the message that says nurses not allowed to check the schedule to see if I am in today.  If this is true how is that they managed to schedule many appointments on any given day for someone to see us.    Please check with the patient to see what it is she needs and I will try to deal with it.

## 2021-06-06 NOTE — TELEPHONE ENCOUNTER
Patient calling.  Asking to speak to the clinic.    Refusing triage or any details of why she is calling.    She is asking for Dr. Hensley to call her at home. Nurse is not allowed to look at schedule to see if he is in today so unable to give her any further info.    Rena Jones, RN  Triage Nurse Advisor    Reason for Disposition    [1] Caller requesting NON-URGENT health information AND [2] PCP's office is the best resource    Protocols used: INFORMATION ONLY CALL-A-

## 2021-06-06 NOTE — TELEPHONE ENCOUNTER
Dr. Blankenship,  Spoke with the patient who states that she really just wanted you to call her to let you know what has been going on.  Patient finally agreed to let me know what is going on and relay to you.  She states that she will not be coming to clinic to be seen, but wants you to know that she was running a temperature from Wednesday last week until Sunday of 99.4.  States that whatever she has been dealing with is worse than the flu.  Her whole body was falling asleep.  Lots of phlegm and coughing, sometimes she would cough up mucus, but it seemed to be whitish in color.  She had diarrhea from Thursday until Sunday, but it went away Sunday night.  Patient states that she is just now trying to get her appetite back, but she has been homebound for the past 7-8 days and has no plans of leaving until she is better.  She has only been taking OTC cough and cold medicine.  Patient states that her cough has been so bad now that her chest hurts.  She has been drinking tea and honey and that has been helping a little bit.  Please advise.  Kendra BROOKS CMA/GEETA....................1:22 PM

## 2021-06-07 NOTE — TELEPHONE ENCOUNTER
Left message to call back for: Shantelle  Information to relay to patient: Rx will be sent in this afternoon.

## 2021-06-07 NOTE — TELEPHONE ENCOUNTER
caller stated that her allergy and congestion is back and is wanting to take medication for it again. Caller stated she has no fever. Patient has below medication on her med list from 1/2019 do you want to refill that?    ALLER-EASE 180 mg tablet  30 tablet  1  1/14/2019      Sig: TAKE ONE TABLET BY MOUTH ONE TIME DAILY     Sent to pharmacy as: ALLER-EASE 180 mg tablet

## 2021-06-07 NOTE — TELEPHONE ENCOUNTER
Medication Request  Medication name: allergy and congestion   Requested Pharmacy: [facility]  Reason for request: caller stated that her allergy and congestion is back and is wanting to take medication for it again. Caller stated she has no fever.   When did you use medication last?:    Patient offered appointment:  patient declined  Okay to leave a detailed message: yes

## 2021-06-08 NOTE — PROGRESS NOTES
"  Office Visit - Follow Up   Shantelle Taveras   61 y.o. female    Date of Visit: 1/31/2017    Chief Complaint   Patient presents with     Cough        Assessment and Plan   1. Cough  Consistent with bronchitis, maybe developing some COPD, treated with prednisone doxycycline albuterol, follow-up with Dr. Javier Blankenship for further evaluation, smoking cessation discussed in detail and nicotine patch prescribed  - XR Chest PA and Lateral    2. Subacute bronchitis    Return in about 1 week (around 2/7/2017) for follow up with PCP.     History of Present Illness   This 61 y.o. old woman who is a patient of Dr. Javier Blankneship comes in for evaluation of a cough.  She has been treated telephonically by Dr. Lara as well as been seen in emergency room although I'm not actually sure if she saw a physician.  She reports some improvement with Augmentin.  She's actually had such significant cough that she is smoking less.  Denies history of asthma or COPD.  She's never reason inhaler.  No fever or chill.  Mild shortness of breath.  No chest pain or pleuritic chest pain     Review of Systems: A comprehensive review of systems was negative except as noted.     Medications, Allergies and Problem List   Reviewed and updated     Physical Exam   General Appearance:   No acute distress    Visit Vitals     /72 (Patient Site: Right Arm, Patient Position: Sitting, Cuff Size: Adult Regular)     Pulse 93     Ht 5' 3\" (1.6 m)     Wt 103 lb (46.7 kg)     SpO2 96%     BMI 18.25 kg/m2       HEENT exam is unremarkable, neck supple, no cervical lymphadenopathy, cardiovascular regular rate and rhythm no murmur gallop or rub, lungs are clear to auscultation bilaterally with the exception of prolonged x-ray phase with end expiratory wheezing heard anteriorly, abdomen soft nontender nondistended no organomegaly, neurologic exam is nonfocal, psychiatric pleasant, no confusion or agitation        Additional Information   Current Outpatient " Prescriptions   Medication Sig Dispense Refill     cyclobenzaprine (FLEXERIL) 10 MG tablet Take 1 tablet (10 mg total) by mouth 3 (three) times a day as needed for muscle spasms. 30 tablet 0     loratadine (CLARITIN) 10 mg tablet TAKE 1 TABLET (10 MG) BY ORAL ROUTE ONCE DAILY AS NEEDED 10 tablet 0     albuterol (PROVENTIL HFA;VENTOLIN HFA) 90 mcg/actuation inhaler Inhale 1-2 puffs every 4 (four) hours as needed for wheezing. 1 Inhaler 11     doxycycline (ADOXA) 100 MG tablet Take 1 tablet (100 mg total) by mouth 2 (two) times a day. 14 tablet 0     doxycycline (VIBRAMYCIN) 100 MG capsule Take 1 capsule (100 mg total) by mouth 2 (two) times a day for 7 days. 14 capsule 0     nicotine (NICODERM CQ) 7 mg/24 hr Place 1 patch on the skin daily. 30 patch 0     predniSONE (DELTASONE) 20 MG tablet Take 2 tablets (40 mg total) by mouth daily for 5 days. 10 tablet 0     No current facility-administered medications for this visit.      Allergies   Allergen Reactions     Codeine      Social History   Substance Use Topics     Smoking status: Current Every Day Smoker     Smokeless tobacco: Never Used     Alcohol use None       Review and/or order of clinical lab tests:  Review and/or order of radiology tests:  Review and/or order of medicine tests:  Discussion of test results with performing physician:  Decision to obtain old records and/or obtain history from someone other than the patient:  Review and summarization of old records and/or obtaining history from someone other than the patient and.or discussion of case with another health care provider:  Independent visualization of image, tracing or specimen itself:    Time:      Javier Johnston MD

## 2021-06-11 NOTE — PROGRESS NOTES
ASSESSMENT:     Diagnoses and all orders for this visit:    Lumbalgia  -     Ambulatory referral to Physical Therapy  -     gabapentin (NEURONTIN) 100 MG capsule; Take 100 mg by mouth daily. Increase dose as directed by chart.  May increase to 1 tabs 3 times daily  Dispense: 180 capsule; Refill: 2    Chronic right SI joint pain  -     Ambulatory referral to Physical Therapy  -     gabapentin (NEURONTIN) 100 MG capsule; Take 100 mg by mouth daily. Increase dose as directed by chart.  May increase to 1 tabs 3 times daily  Dispense: 180 capsule; Refill: 2    Lumbar radiculitis  -     Ambulatory referral to Physical Therapy  -     gabapentin (NEURONTIN) 100 MG capsule; Take 100 mg by mouth daily. Increase dose as directed by chart.  May increase to 1 tabs 3 times daily  Dispense: 180 capsule; Refill: 2    Myofascial pain  -     Ambulatory referral to Physical Therapy  -     gabapentin (NEURONTIN) 100 MG capsule; Take 100 mg by mouth daily. Increase dose as directed by chart.  May increase to 1 tabs 3 times daily  Dispense: 180 capsule; Refill: 2    Sleep disturbance  -     Ambulatory referral to Physical Therapy  -     gabapentin (NEURONTIN) 100 MG capsule; Take 100 mg by mouth daily. Increase dose as directed by chart.  May increase to 1 tabs 3 times daily  Dispense: 180 capsule; Refill: 2            Shantelle Taveras is a 62 y.o. female  with a BMI of Body mass index is 18.23 kg/(m^2). who presents today in consultation at the request of Javier Wells MD  for new patient evaluation of chronic bilateral low back pain with right side being worse than the left side.  Patient reports flareup of her symptoms 3 weeks ago without a preceding trauma.  She has intermittent localized burning sensation at the right distal medial lower leg.  Lumbar MRI shows disc herniation at the L4-L5 that compresses the traversing L5 nerve root bilaterally, in which the compression of the right L4-L5 could contribute to the patient  symptoms of intermittent burning sensation at the right distal/ medial lower leg.  Patient symptoms of the low back pain with the right side being worse than the left is consistent with right sacroiliitis.  Patient would like to proceed with conservative treatment and avoid therapeutic steroid injection at this time.  I recommend patient to start with gabapentin 100 mg 1 tablet 3 times daily, physical therapy focusing on the right SI joint.  If she does not improve with therapeutic steroid injection, I recommend right SI therapeutic steroid injection.  No red flags.          CAROLEE:  40  WHO-5:  18    PLAN:  A shared decision making model was used.  The patient's values and choices were respected.  The following represents what was discussed and decided upon by the physician and the patient.      1.  DIAGNOSTIC TESTS: Lumbar MRI imaging and the report is reviewed with the patient today.  She verbalizes understanding.  2.  PHYSICAL THERAPY:  Discussed the importance of core strengthening, ROM, stretching exercises with the patient and how each of these entities is important in decreasing pain.  Explained to the patient that the purpose of physical therapy is to teach the patient a home exercise program.  These exercises need to be performed every day in order to decrease pain and prevent future occurrences of pain.  Likened it to brushing one's teeth.  Patient will start on physical therapy  program.   3.  MEDICATIONS: Patient will start on gabapentin 100 mg 1 tablet 3 times daily.  Side effects of the medication discussed with the patient today.  4.  INTERVENTIONS: Right SI therapeutic steroid injection if she does not improve with physical therapy and medication..   5.  PATIENT EDUCATION: I thoroughly discussed the plan with the patient today.  She verbalizes understanding and agrees with the plan.      FOLLOW-UP:   Patient will follow up with me in 4 weeks.  All questions were answered.      JESSE Purvis,  SUZE-C          SUBJECTIVE:  Shantelle Taveras  Is a 62 y.o. female who presents today for new patient evaluation of low back pain.  Patient reports 3 weeks history of flaring off of her low back pain.  She reports bilateral low back pain with the right side being worse than the left side.  She reports burning sensation at the right medial lower leg that is intermittent in nature.  Patient reports history of chronic low back pain since 1980 where she had an MRI at that time that showed disc bulge.  At that time she underwent 3 therapeutic steroid injection without pain relief.  She had physical therapy and chiropractic treatment and had improvement in her symptoms.  Patient stated that she tried to be active all these years with walking and that seems to help.  She denies recent trauma to the lower back, motor vehicle accidents, back surgeries.  At this time she reports 6 out of 10 intensity pain in the right lower back.  Her symptoms are aggravated by walking and rates it at 9 out of 10 intensity on its worse.  Her symptoms are mildly elevated by taking Advil 200 mg.  Lumbar MRI done on July 5, 2017 shows moderate disc bulge at the L4-L5 that may mildly compress the bilateral traversing L5 nerve roots in the lateral recess.  There is mild disc bulge at the L5-S1 which contacts bilaterally the traversing S1 nerve root and may minimally compress the traversing left S1 nerve root.  Patient denies urinary or bowel incontinence, unintentional weight loss, saddle anesthesia, fever or chills, balance difficulties.      Medications:    Current Outpatient Prescriptions   Medication Sig Dispense Refill     albuterol (PROVENTIL HFA;VENTOLIN HFA) 90 mcg/actuation inhaler Inhale 1-2 puffs every 4 (four) hours as needed for wheezing. 1 Inhaler 11     cyclobenzaprine (FLEXERIL) 10 MG tablet Take 1 tablet (10 mg total) by mouth 3 (three) times a day as needed for muscle spasms. 30 tablet 0     doxycycline (ADOXA) 100 MG tablet Take 1  tablet (100 mg total) by mouth 2 (two) times a day. 14 tablet 0     ferrous sulfate 325 (65 FE) MG tablet Take 1 tablet (325 mg total) by mouth daily. 30 tablet 3     fexofenadine (ALLEGRA) 180 MG tablet Take 1 tablet (180 mg total) by mouth daily. 30 tablet 2     fexofenadine-pseudoephedrine (ALLEGRA-D 24) 180-240 mg per 24 hr tablet Take 1 tablet by mouth daily. 30 tablet 2     gabapentin (NEURONTIN) 100 MG capsule Take 100 mg by mouth daily. Increase dose as directed by chart.  May increase to 1 tabs 3 times daily 180 capsule 2     loratadine (CLARITIN) 10 mg tablet TAKE 1 TABLET (10 MG) BY ORAL ROUTE ONCE DAILY AS NEEDED 10 tablet 0     nicotine (NICODERM CQ) 7 mg/24 hr Place 1 patch on the skin daily. 30 patch 0     No current facility-administered medications for this encounter.        Allergies:   Allergies   Allergen Reactions     Codeine        PMH:  No past medical history on file.    PSH:    Past Surgical History:   Procedure Laterality Date     HYSTERECTOMY       OOPHORECTOMY       VT TOTAL ABDOM HYSTERECTOMY      Description: Total Abdominal Hysterectomy;  Recorded: 01/31/2011;  Comments: 2003       Family History:  No family history on file.    Social History:   Social History     Social History     Marital status: Single     Spouse name: N/A     Number of children: N/A     Years of education: N/A     Occupational History     Not on file.     Social History Main Topics     Smoking status: Current Every Day Smoker     Smokeless tobacco: Never Used     Alcohol use Not on file     Drug use: Not on file     Sexual activity: Not on file     Other Topics Concern     Not on file     Social History Narrative       ROS: Bilateral low back pain with right side being worse than the left side.  Specifically negative for bowel/bladder dysfunction, fevers,chills, appetite changes, unexplained weight loss.   Otherwise 13 systems reviewed are negative.  Please see the patient's intake questionnaire from today for  details.      OBJECTIVE:  PHYSICAL EXAMINATION:    CONSTITUTIONAL:  Vital signs as above.  No acute distress.  The patient is well nourished and well groomed.  PSYCHIATRIC:  The patient is awake, alert, oriented to person, place, time and answering questions appropriately with clear speech.    SKIN:  Skin over the face, bilateral lower extremities, and posterior torso is clean, dry, intact without rashes.    GAIT:  Gait is non-antalgic.  The patient is able to heel and toe walk without significant difficulty.    STANDING EXAMINATION: Tenderness present at the right SI joint.  MUSCLE STRENGTH:  The patient has 5/5 strength for the bilateral hip flexors, knee flexors/extensors, ankle dorsiflexors/plantar flexors, great toe extensors, ankle evertors/invertors.  NEUROLOGICAL:  2/4 patellar, medial hamstring, and achilles reflexes bilaterally.  Negative Babinski's bilaterally.  No ankle clonus bilaterally. Sensation to light touch is intact in the bilateral L4, L5, and S1 dermatomes.  VASCULAR:  2/4  pulses bilaterally.  Bilateral lower extremities are warm.  There is no pitting edema of the bilateral lower extremities.  ABDOMINAL:  Soft, non-distended, non-tender throughout all quadrants.  No pulsatile mass palpated in the left lower quadrant.  LYMPH NODES:  No palpable or tender inguinal/popliteal lymph nodes.  MUSCULOSKELETAL: Positive straight leg raise on the right.  Positive Jan test on the right.  Negative hip impingement bilaterally.      RESULTS:      [facility]  MR LUMBAR SPINE WO CONTRAST  7/5/2017 11:32 AM      INDICATION: Intermittent low back pain with symptoms of neuropathy into the right leg.  TECHNIQUE: Routine.  CONTRAST: None  COMPARISON: None.     FINDINGS: Nomenclature is based on 5 lumbar type vertebral bodies. Normal vertebral body heights. Mild levoconvex curvature of the lumbar spine. No suspicious bone marrow signal abnormality. No pars defect. The conus tip is identified at  L1-L2.. Grossly   normal paraspinal soft tissues. No abdominal aortic aneurysm. The visualized portions of the bony pelvis are normal for age.     T12-L1: Normal disc height and signal. No herniation. No facet arthropathy. No spinal canal stenosis. No right neural foraminal stenosis. No left neural foraminal stenosis.     L1-L2: Normal disc height and signal. No herniation. No facet arthropathy. No spinal canal stenosis. No right neural foraminal stenosis. No left neural foraminal stenosis.     L2-L3: Normal disc height and signal. Mild asymmetric left disc bulge which mildly contacts the traversing left L3 nerve root. No herniation. No facet arthropathy. No spinal canal stenosis. No right neural foraminal stenosis. No left neural foraminal   stenosis.     L3-L4: Mild loss of disc height and moderate loss of T2-weighted signal in the disc. Moderate asymmetric left disc bulge which contacts the bilateral traversing L4 nerve roots. Small left foraminal/far lateral disc protrusion which contacts the   undersurface of the extraforaminal portion of the exiting left L3 nerve root. No facet arthropathy. No spinal canal stenosis. No right neural foraminal stenosis. No left neural foraminal stenosis.     L4-L5: Mild to moderate loss of disc height and moderate loss of T2-weighted signal in the disc. There is a moderate disc bulge which may mildly compress the bilateral traversing L5 nerve roots within the lateral recesses. There is severe bilateral   lateral recess stenosis. No herniation. Mild bilateral facet arthropathy. Mild spinal canal stenosis. No right neural foraminal stenosis. No left neural foraminal stenosis.     L5-S1: Normal disc height. Moderate loss of T2-weighted signal in the disc. Mild disc bulge which contacts the bilateral traversing S1 nerve roots. It may minimally compress the traversing left S1 nerve root. No facet arthropathy. No spinal canal   stenosis. No right neural foraminal stenosis. No left  neural foraminal stenosis.     IMPRESSION:   CONCLUSION:  1.  At L4-L5, there is a moderate disc bulge which may mildly compress the bilateral traversing L5 nerve roots in the lateral recesses.      2.  At L3-L4, there is a small left foraminal/far lateral disc protrusion which contacts the undersurface of the extraforaminal portion of the exiting left L3 nerve root.     3.  At L5-S1, there is mild disc bulge which contacts the bilateral traversing S1 nerve roots and may minimally compress the traversing left S1 nerve root.

## 2021-06-11 NOTE — PROGRESS NOTES
[facility] Follow Up Note    Shantelle MORTEZA Nitin   62 y.o. female    Date of Visit: 6/26/2017    Chief Complaint   Patient presents with     Foot Injury     right,      Back Pain     Subjective  This is a 62-year-old lady who comes in for several reasons.  The first is some skin lesions that she has noticed.  One is in the left temporal region of the face it is been there for a while.  She is not sure if there has been any significant change in it.  The second is in the middle of the back and was fairly recently noticed.  It is in the lower back and around the waistline.  She is unable to see it and so would like this checked.  Second problem is some intermittent burning sensation in the lower right leg just above the ankle.  This is gone on for a number of months intermittently but seems to be increasing in frequency.  In addition to this she is having some discomfort in the right lower back region.  Previously she had been told she had a bulging disc but it seemed to resolve on its own and she was fine until recently.  She tells me that she has trouble sometimes in sitting or getting into a comfortable position.  Her other issue is some increased nasal congestion while working in the garden and she is requesting a decongestant.  She also would like to get on some iron tablets.  No other problems reported.    ROS A comprehensive review of systems was performed and was otherwise negative    Medications, allergies, and problem list were reviewed and updated    Exam  General Appearance:   On examination her blood pressure is 122/70.  Weight is 102 pounds and height is 63 inches.  BMI is 18.07.    Heart is in sinus rhythm with a rate of 85 and no ectopy.    Examination of the right lower leg is unremarkable.  Skin color and temperature are normal.  There is no edema.  There is no palpable tenderness.    Skin examination.  She has a lesion on the left temple region that is about the size of a  quarter.  It is dark but not raised.  She has a small benign appearing lesion in the lower mid back region that is in the waistline.    The patient is alert and oriented ×3.      Assessment/Plan  1. Back pain  MR Lumbar Spine With Without Contrast   2. Skin lesion  Ambulatory referral to Dermatology   3. Nasal congestion       Skin lesions.  The back lesion appears benign but is in a bad location for her.  I am a little less certain about the lesion on the left temple region.  I suggested her best approach might be to have her see a dermatologist and she is agreeable to that.    Low back pain with burning in the right lower leg.  Given the increased frequency of the episodes I think an MRI scan of the LS spine would be in order.  We will schedule that and follow-up with her.    Upper respiratory congestion.  I will try her on some Allegra-D.    She is overdue for mammogram and I did discuss this with her.  She is trying to get a reduced cost on this and will set it up when she can make that arrangement.    Total time of this office visit was 25 minutes with greater than 50% of the time spent in care coordination and patient counseling.      Javier Blankenship MD      Current Outpatient Prescriptions on File Prior to Visit   Medication Sig     albuterol (PROVENTIL HFA;VENTOLIN HFA) 90 mcg/actuation inhaler Inhale 1-2 puffs every 4 (four) hours as needed for wheezing.     cyclobenzaprine (FLEXERIL) 10 MG tablet Take 1 tablet (10 mg total) by mouth 3 (three) times a day as needed for muscle spasms.     doxycycline (ADOXA) 100 MG tablet Take 1 tablet (100 mg total) by mouth 2 (two) times a day.     loratadine (CLARITIN) 10 mg tablet TAKE 1 TABLET (10 MG) BY ORAL ROUTE ONCE DAILY AS NEEDED     nicotine (NICODERM CQ) 7 mg/24 hr Place 1 patch on the skin daily.     No current facility-administered medications on file prior to visit.      Allergies   Allergen Reactions     Codeine      Social History   Substance Use Topics      Smoking status: Current Every Day Smoker     Smokeless tobacco: Never Used     Alcohol use None

## 2021-06-12 NOTE — PROGRESS NOTES
Shantelle JIMBO Taveras arrives in clinic today for her annual flu shot. Flu shot questionnaires completed prior to injection. Injection given without incident, see immunization tab for injection details.       STEPHEN Junior - Internal Medicine

## 2021-06-12 NOTE — PROGRESS NOTES
[facility]   Initial Evaluation    Patient Name: Shantelle Taveras  Date of evaluation: 7/25/2017  PRECAUTIONS: none  Referral Diagnosis:   724.2 (ICD-9-CM) - M54.5 (ICD-10-CM) - Lumbalgia   724.6, 338.29 (ICD-9-CM) - M53.3, G89.29 (ICD-10-CM) - Chronic right SI joint pain   724.4 (ICD-9-CM) - M54.16 (ICD-10-CM) - Lumbar radiculitis   729.1 (ICD-9-CM) - M79.1 (ICD-10-CM) - Myofascial pain   780.50 (ICD-9-CM) - G47.9 (ICD-10-CM) - Sleep disturbance     Referring provider: Shelli Strickland PA-C  Visit Diagnosis:     ICD-10-CM    1. Acute right-sided low back pain with right-sided sciatica M54.41    2. Decreased ROM of lumbar spine M25.60        Assessment:      Pt. is appropriate for skilled PT intervention as outlined in the Plan of Care (POC).  Pt. is a good candidate for skilled PT services to improve pain levels and function.  Signs/sx consistent with chronic right lumbar radiculopathy within the L4 distribution. Pt demonstrates + peripheralization with flexion, + centralization of R LE sx with extension-based ex. HEP initiated today, and patient with excellent tolerance for all ex. She should benefit well from skilled PT for successful return to PLOF, which she describes as not limited.    Goals:  Pt. will be independent with home exercise program in : 2 weeks  Pt. 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    Barriers to Learning or Achieving Goals:  No Barriers.       Plan / Patient Instructions:        Plan of Care:   Communication with: Referral Source  Patient Related  "Instruction: Nature of Condition;Treatment plan and rationale;Self Care instruction;Basis of treatment;Body mechanics;Posture;Precautions;Next steps;Expected outcome  Times per Week: 1-2  Number of Weeks: 3-4  Number of Visits: 8  Precautions / Restrictions : none  Therapeutic Exercise: ROM;Stretching;Strengthening (repeated extension ex)  Neuromuscular Reeducation: posture;kinesio tape;core  Manual Therapy: joint mobilization  Modalities: traction (prn)    Plan for next visit: Review HEP, adding manual sciatic nerve mobilizations as needed.     Subjective:       History of Present Illness:    Shantelle is a 62 y.o. female who presents to therapy today with complaints of chronic right-sided lower back/SIJ pain and a burning sensation along the inside/anterior portion of the R calf, most consistent within R L4 distribution.  With B groin pain/discomfort as well.   Onset: About 6 months ago. Insidious and gradual onset. Feels pain to be continued, not getting any better.  Duration: Intermittent  Worse with prolonged sitting (> 15 min), bending, lifting. When pain is worse, finds work to be more difficult.  Better with walking and moving around  Pain Medication: Recently started gabapentin, often 1-2x/day.  Sleep: Reports waking 2x/night on average due to pain.  Notes previous bulging disc in the lower back back in the 1980s. Had received 3 injections without much benefit, but started doing a workout program and that helped.    Pt seeks PT to \"strengthen back where the disc is.\"    Pain Ratin  Pain rating at best: 0  Pain rating at worst: 10  Pain description: aching, burning, dull, pain, sharp and soreness     Objective:      Patient Outcome Measures :    Modified Oswestry Low Back Pain Disablity Questionnaire  in %: 36   Scores range from 0-100%, where a score of 0% represents minimal pain and maximal function. The minimal clinically important difference is a score reduction of 12%.     PER EMR:  [facility]  MR LUMBAR SPINE WO CONTRAST  7/5/2017 11:32 AM      INDICATION: Intermittent low back pain with symptoms of neuropathy into the right leg.  TECHNIQUE: Routine.  CONTRAST: None  COMPARISON: None.     FINDINGS: Nomenclature is based on 5 lumbar type vertebral bodies. Normal vertebral body heights. Mild levoconvex curvature of the lumbar spine. No suspicious bone marrow signal abnormality. No pars defect. The conus tip is identified at L1-L2.. Grossly   normal paraspinal soft tissues. No abdominal aortic aneurysm. The visualized portions of the bony pelvis are normal for age.     T12-L1: Normal disc height and signal. No herniation. No facet arthropathy. No spinal canal stenosis. No right neural foraminal stenosis. No left neural foraminal stenosis.     L1-L2: Normal disc height and signal. No herniation. No facet arthropathy. No spinal canal stenosis. No right neural foraminal stenosis. No left neural foraminal stenosis.     L2-L3: Normal disc height and signal. Mild asymmetric left disc bulge which mildly contacts the traversing left L3 nerve root. No herniation. No facet arthropathy. No spinal canal stenosis. No right neural foraminal stenosis. No left neural foraminal   stenosis.     L3-L4: Mild loss of disc height and moderate loss of T2-weighted signal in the disc. Moderate asymmetric left disc bulge which contacts the bilateral traversing L4 nerve roots. Small left foraminal/far lateral disc protrusion which contacts the   undersurface of the extraforaminal portion of the exiting left L3 nerve root. No facet arthropathy. No spinal canal stenosis. No right neural foraminal stenosis. No left neural foraminal stenosis.     L4-L5: Mild to moderate loss of disc height and moderate loss of T2-weighted signal in the disc. There is a moderate disc bulge which may mildly compress the bilateral traversing L5 nerve roots within the lateral recesses. There is severe bilateral   lateral recess stenosis. No  herniation. Mild bilateral facet arthropathy. Mild spinal canal stenosis. No right neural foraminal stenosis. No left neural foraminal stenosis.     L5-S1: Normal disc height. Moderate loss of T2-weighted signal in the disc. Mild disc bulge which contacts the bilateral traversing S1 nerve roots. It may minimally compress the traversing left S1 nerve root. No facet arthropathy. No spinal canal   stenosis. No right neural foraminal stenosis. No left neural foraminal stenosis.     IMPRESSION:   CONCLUSION:  1.  At L4-L5, there is a moderate disc bulge which may mildly compress the bilateral traversing L5 nerve roots in the lateral recesses.      2.  At L3-L4, there is a small left foraminal/far lateral disc protrusion which contacts the undersurface of the extraforaminal portion of the exiting left L3 nerve root.     3.  At L5-S1, there is mild disc bulge which contacts the bilateral traversing S1 nerve roots and may minimally compress the traversing left S1 nerve root.       Examination  1. Acute right-sided low back pain with right-sided sciatica     2. Decreased ROM of lumbar spine       Precautions/Restrictions: None  Involved side: Right  Posture Observation:      General sitting posture is  normal.  General standing posture is normal.  WNL lumbar lordosis.    Lumbar ROM: All % of WNL  Date:      *Indicate scale AROM AROM AROM   Lumbar Flexion 100 with + R L4 burning     Lumbar Extension 100 with + centralization of pain      Right Left Right Left Right Left   Lumbar Sidebending 75 with mild pain 100       Lumbar Rotation 100 100       Thoracic Flexion      Thoracic Extension      Thoracic Sidebending         Thoracic Rotation           (+) Slump on R as compared to the L.  SLR: 70* on L, 90* on R.  Hip PROM WNL and NP t/o bilaterally.  Mild TTP R SIJ and piriformis, with mildly increased muscle spasm present in R piriformis. Denies TTP lumbar paraspinals or L glute area.  Reports decreased pain with sacral  thrust.    Treatment Today     TREATMENT MINUTES COMMENTS   Evaluation 10 Lumbar   Self-care/ Home management     Manual therapy     Neuromuscular Re-education     Therapeutic Activity     Therapeutic Exercises 20 -Discussed diagnosis, prognosis, POC, relevant anatomy and basis for treatment.  -Reviewed to avoid repetitive bending forward, for example, when bending to lift objects from the floor, using proper body mechanics.  -Reviewed and performed HEP with handouts provided.  -Encouraged continued, regular walking, discussing basics of neurodynamics.   Gait training     Modality__________________                Total 30    Blank areas are intentional and mean the treatment did not include these items.            PT Evaluation Code: (Please list factors)  Patient History/Comorbidities: none  Examination: Lumbar  Clinical Presentation: Stable  Clinical Decision Making: Low    Patient History/  Comorbidities Examination  (body structures and functions, activity limitations, and/or participation restrictions) Clinical Presentation Clinical Decision Making (Complexity)   No documented Comorbidities or personal factors 1-2 Elements Stable and/or uncomplicated Low   1-2 documented comorbidities or personal factor 3 Elements Evolving clinical presentation with changing characteristics Moderate   3-4 documented comorbidities or personal factors 4 or more Unstable and unpredictable High           Zohaib Allen  7/25/2017  11:06 AM

## 2021-06-12 NOTE — PROGRESS NOTES
Assessment / Plan:     Diagnoses and all orders for this visit:    Lumbalgia    Chronic right SI joint pain    Lumbar radiculitis  -     tiZANidine (ZANAFLEX) 4 MG tablet; Take 1 tablet at qhs prn for muscle spasm.  Dispense: 30 tablet; Refill: 0    Myofascial pain  -     tiZANidine (ZANAFLEX) 4 MG tablet; Take 1 tablet at qhs prn for muscle spasm.  Dispense: 30 tablet; Refill: 0    Sleep disturbance  -     tiZANidine (ZANAFLEX) 4 MG tablet; Take 1 tablet at qhs prn for muscle spasm.  Dispense: 30 tablet; Refill: 0          Shantelle Taveras is a 62 y.o. y.o. female with past medical history significant for asthma, who presents today for follow-up regarding bilateral low back pain, with the right side worse than the left side.  Patient right-sided lower back pain is consistent with right sacroiliitis.  I discussed with patient right SI joint therapeutic steroid injection, or to continue with applying ice and physical therapy, and tizanidine 2 mg at nighttime for muscle spasm.  Patient would like to continue with physical therapy and muscle relaxer and applying ice at this time.  With respect to the intermittent burning sensation at the right distal medial lower leg sensation that is likely due to the moderate disc bulge at the L4-L5, patient will continue with physical therapy and gabapentin 100 mg.   No red flags.    A shared decision making plan was used.  The patient's values and choices were respected.  The following represents what was discussed and decided upon by the physician and the patient.      1.  DIAGNOSTIC TESTS: I reviewed the lumbar MRI imaging and the report with the patient today.  He verbalizes understanding.  2.  PHYSICAL THERAPY: Patient will continue on physical therapy [facility] program.  3.  MEDICATIONS: Patient will continue on gabapentin 100 mg 1 tablet 3 times daily.  Tizanidine 2 mg 1 tablet at nighttime and may increase to 2 tablets at nighttime for muscle relaxer as needed.  Side effects of  the medication discussed with the patient today.  4.  INTERVENTIONS: Consider right SI joint therapeutic steroid injection if not improved with physical therapy and medication, and applying ice.  5.  PATIENT EDUCATION: I thoroughly discussed the plan with the patient today.  She verbalizes understanding and agrees with the plan.  6.  FOLLOW-UP: Patient will follow up with me in 4  weeks.  All questions were answered.      JESSE Purvis, SUZE-C      Subjective:     Shantelle Taveras is a 62 y.o. female who presents today for follow-up regarding bilateral low back pain, with right side being worse than the left side.  Today patient returns to the clinic after attending physical therapy and starting on gabapentin.  Patient reports improvement in her symptoms however she felt worsening of pain at the right lower back at the right SI joint region.  Patient stated that she tries not to take medication for pain.  At this time she reports her pain at 8 out of 10 intensity at the right lower back.  She reports intermittent burning sensation at the right distal medial lower leg.  Her symptoms are aggravated by sitting in a vehicle for long time, and are relieved by standing up and stretching.Patient denies urinary or bowel incontinence, unintentional weight loss, saddle anesthesia, fever or chills, balance difficulties.      Review of Systems:  Bilateral low back pain with the right side being worse than the left side. Patient denies urinary or bowel incontinence, unintentional weight loss, saddle anesthesia, fever or chills, balance difficulties.       Objective:   CONSTITUTIONAL:  Vital signs as above.  No acute distress.  The patient is well nourished and well groomed.    PSYCHIATRIC:  The patient is awake, alert, oriented to person, place and time.  The patient is answering questions appropriately with clear speech.  Normal affect.  SKIN:  Skin over the face, posterior torso, bilateral upper and lower extremities is  clean, dry, intact without rashes.  MUSCULOSKELETAL:  Gait is non-antalgic.  The patient is able to heel and toe walk without any difficulty.  Mild tenderness over the right L4-L5 lumbar paraspinal muscles.      The patient has 5/5 strength for the bilateral hip flexors, knee flexors/extensors, ankle dorsiflexors/plantar flexors, ankle evertors/invertors.    NEUROLOGICAL:  2/4 patellar, medial hamstring, achilles reflexes which are symmetric bilaterally.  No ankle clonus bilaterally.  Sensation to light touch is intact in the bilateral L4, L5, and S1 dermatomes.    Positive right Jan's test.  Positive right straight leg raise.  Negative hip impingement bilaterally.     RESULTS:      [facility]  MR LUMBAR SPINE WO CONTRAST  7/5/2017 11:32 AM       INDICATION: Intermittent low back pain with symptoms of neuropathy into the right leg.  TECHNIQUE: Routine.  CONTRAST: None  COMPARISON: None.      FINDINGS: Nomenclature is based on 5 lumbar type vertebral bodies. Normal vertebral body heights. Mild levoconvex curvature of the lumbar spine. No suspicious bone marrow signal abnormality. No pars defect. The conus tip is identified at L1-L2.. Grossly   normal paraspinal soft tissues. No abdominal aortic aneurysm. The visualized portions of the bony pelvis are normal for age.      T12-L1: Normal disc height and signal. No herniation. No facet arthropathy. No spinal canal stenosis. No right neural foraminal stenosis. No left neural foraminal stenosis.      L1-L2: Normal disc height and signal. No herniation. No facet arthropathy. No spinal canal stenosis. No right neural foraminal stenosis. No left neural foraminal stenosis.      L2-L3: Normal disc height and signal. Mild asymmetric left disc bulge which mildly contacts the traversing left L3 nerve root. No herniation. No facet arthropathy. No spinal canal stenosis. No right neural foraminal stenosis. No left neural foraminal   stenosis.      L3-L4: Mild loss of  disc height and moderate loss of T2-weighted signal in the disc. Moderate asymmetric left disc bulge which contacts the bilateral traversing L4 nerve roots. Small left foraminal/far lateral disc protrusion which contacts the   undersurface of the extraforaminal portion of the exiting left L3 nerve root. No facet arthropathy. No spinal canal stenosis. No right neural foraminal stenosis. No left neural foraminal stenosis.      L4-L5: Mild to moderate loss of disc height and moderate loss of T2-weighted signal in the disc. There is a moderate disc bulge which may mildly compress the bilateral traversing L5 nerve roots within the lateral recesses. There is severe bilateral   lateral recess stenosis. No herniation. Mild bilateral facet arthropathy. Mild spinal canal stenosis. No right neural foraminal stenosis. No left neural foraminal stenosis.      L5-S1: Normal disc height. Moderate loss of T2-weighted signal in the disc. Mild disc bulge which contacts the bilateral traversing S1 nerve roots. It may minimally compress the traversing left S1 nerve root. No facet arthropathy. No spinal canal   stenosis. No right neural foraminal stenosis. No left neural foraminal stenosis.      IMPRESSION:   CONCLUSION:  1.  At L4-L5, there is a moderate disc bulge which may mildly compress the bilateral traversing L5 nerve roots in the lateral recesses.       2.  At L3-L4, there is a small left foraminal/far lateral disc protrusion which contacts the undersurface of the extraforaminal portion of the exiting left L3 nerve root.      3.  At L5-S1, there is mild disc bulge which contacts the bilateral traversing S1 nerve roots and may minimally compress the traversing left S1 nerve root.

## 2021-06-12 NOTE — PROGRESS NOTES
"[facility] Daily Progress     Patient Name: Shantelle Taveras  Date: 8/2/2017  Visit #: 2  PTA visit #:    PRECAUTIONS: none  Referral Diagnosis:   724.2 (ICD-9-CM) - M54.5 (ICD-10-CM) - Lumbalgia   724.6, 338.29 (ICD-9-CM) - M53.3, G89.29 (ICD-10-CM) - Chronic right SI joint pain   724.4 (ICD-9-CM) - M54.16 (ICD-10-CM) - Lumbar radiculitis   729.1 (ICD-9-CM) - M79.1 (ICD-10-CM) - Myofascial pain   780.50 (ICD-9-CM) - G47.9 (ICD-10-CM) - Sleep disturbance      Referring provider: Shelli Strickland PA-C  Visit Diagnosis:     ICD-10-CM    1. Acute right-sided low back pain with right-sided sciatica M54.41    2. Decreased ROM of lumbar spine M25.60          Assessment:     HEP/POC compliance is  good .  Patient demonstrates understanding/independence with home program.  Patient is benefitting from skilled physical therapy and is making steady progress toward functional goals.   Pt with excellent response to therapy, demonstrating (+) centralization of lumbar radiculopathy sx with extension-based ex. Goals nearing/MET.    Goal Status:  Pt. will be independent with home exercise program in : 2 weeks. MET  Pt. will have improved quality of sleep: waking less times/night;in 4 weeks. MET  Patient will sit: 60 minutes;with no pain;in 4 weeks. PROGRESSING  Patient will decrease : CAROLEE score;for improved quality of life;in 4 weeks;by _ points  by ___ points: >= 30% from IE. 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 MET    Plan / Patient Education:     Encouraged to continue with regular performance of HEP to further reduce and prevent reoccurrences of pain.  Pt plans [facility] on 8/7/17.  Agreeable to hold chart open x 30 days for follow-up prn; otherwise, may discharge chart.    Subjective:     Pain Rating: \"Just a little bit, but not really " "now since I walked here.\"  Still mostly present with prolonged sitting, and feels better when I get up and move.  Doing HEP regularly, and that helps to relieve pain.  Still will get some burning in the lower R leg, but feels better with walking and with the exercises.  Sleep has been fine this week without difficulties.  Did not bother me much this week at work.    Objective:     CAROLEE: 12% (significantly improved from 36% on IE).  Lumbar ROM: All % of WNL  Date:      *Indicate scale AROM AROM AROM   Lumbar Flexion 100     Lumbar Extension 100 with relief of pain      Right Left Right Left Right Left   Lumbar Sidebending 100 with mild pain in R lower back 100       Lumbar Rotation 100 100       Thoracic Flexion      Thoracic Extension      Thoracic Sidebending         Thoracic Rotation           SLR: 90* bilaterally, with muscular tension only.    Treatment Today     TREATMENT MINUTES COMMENTS   Evaluation     Self-care/ Home management     Manual therapy     Neuromuscular Re-education     Therapeutic Activity     Therapeutic Exercises 24 Ex per flow sheet  Outcomes assessment   Gait training     Modality__________________                Total 24    Blank areas are intentional and mean the treatment did not include these items.       Zohaib Allen  8/2/2017    [facility] Discharge Summary  Patient Name: Shantelle Taveras  Date: 9/5/2017  Referral Diagnosis:   724.2 (ICD-9-CM) - M54.5 (ICD-10-CM) - Lumbalgia   724.6, 338.29 (ICD-9-CM) - M53.3, G89.29 (ICD-10-CM) - Chronic right SI joint pain   724.4 (ICD-9-CM) - M54.16 (ICD-10-CM) - Lumbar radiculitis   729.1 (ICD-9-CM) - M79.1 (ICD-10-CM) - Myofascial pain   780.50 (ICD-9-CM) - G47.9 (ICD-10-CM) - Sleep disturbance      Referring provider: Shelli Strickland PA-C  Visit Diagnosis:   1. Acute right-sided low back pain with right-sided sciatica     2. Decreased ROM of lumbar spine         Goals:  Pt. will be independent with home exercise program in : 2 weeks. " MET  Pt. will have improved quality of sleep: waking less times/night;in 4 weeks. MET  Patient will sit: 60 minutes;with no pain;in 4 weeks. PROGRESSING  Patient will decrease : CAROLEE score;for improved quality of life;in 4 weeks;by _ points  by ___ points: >= 30% from IE. 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 MET    Patient was seen for 2 visits from 07/25/17 to 08/02/17 with 0 missed appointments.  The patient met goals and has demonstrated understanding of and independence in the home program for self-care, and progression to next steps.  She will initiate contact if questions or concerns arise.  The patient reports feeling better and did not wish to schedule further therapy at this time.    Therapy will be discontinued at this time.  The patient will need a new referral to resume.    Thank you for your referral.  Zohaib Allen  9/5/2017  11:04 AM

## 2021-06-13 NOTE — PATIENT INSTRUCTIONS - HE
Shingles shot is optional but is recommended if covered by insurance and is given at the pharmacy   Patient Education   Alcohol Use   Many people can enjoy a glass of wine or beer without any negative consequences to their health. According to the Centers for Disease Control and Prevention (CDC), having one or fewer drinks per day for women and two or fewer per day for men is considered moderate drinking.     When people drink more than moderately, it can become concerning. Excessive drinking is defined as consuming 15 drinks or more per week for men and 8 drinks or more per week for women. There are various health problems associated with excessive drinking, which include:    Damage to vital organs like the heart, brain, liver and pancreas    Harm to the digestive tract    Weaken the immune system    Higher risk for heart disease and cancer       Patient Education   Activities of Daily Living  Your Health Risk Assessment indicates you have difficulties with activities of daily living such as eating, getting dressed, grooming, bathing, walking, or using the toilet. Please make a follow up appointment for us to address this issue in more detail.     Patient Education   Urinary Incontinence, Female (Adult)  Urinary incontinence means loss of control of the bladder. This problem affects many women, especially as they get older. If you have incontinence, you may be embarrassed to ask for help. But know that this problem can be treated.  Types of Incontinence  There are different types of incontinence. Two of the main types are described here. You can have more than one type.    Stress incontinence. With this type, urine leaks when pressure (stress) is put on the bladder. This may happen when you cough, sneeze, or laugh. Stress incontinence most often occurs because the pelvic floor muscles that support the bladder and urethra are weak. This can happen after pregnancy and vaginal childbirth or a hysterectomy. It can also  be due to excess body weight or hormone changes.    Urge incontinence (also called overactive bladder). With this type, a sudden urge to urinate is felt often. This may happen even though there may not be much urine in the bladder. The need to urinate often during the night is common. Urge incontinence most often occurs because of bladder spasms. This may be due to bladder irritation or infection. Damage to bladder nerves or pelvic muscles, constipation, and certain medicines can also lead to urge incontinence.  Treatment of urinary incontinence depends on the cause. Further evaluation is needed to find the type you have. This will likely include an exam and certain tests. Based on the results, you and your healthcare provider can then plan treatment. Until a diagnosis is made, the home care tips below can help relieve symptoms.  Home care    Do pelvic floor muscle exercises, if they are prescribed. The pelvic floor muscles help support the bladder and urethra. Many women find that their symptoms improve when doing special exercises that strengthen these muscles. To do the exercises contract the muscles you would use to stop your stream of urine, but do this when you re not urinating. Hold for 10 seconds, then relax. Repeat 10 to 20 times in a row, at least 3 times a day. Your provider may give you other instructions for how to do the exercises and how often.    Keep a bladder diary. This helps track how often and how much you urinate over a set period of time. Bring this diary with you to your next visit with the provider. The information can help your provider learn more about your bladder problem.    Lose weight, if advised to by your provider. Excess weight puts pressure on the bladder. Your provider can help you create a weight-loss plan that s right for you. This may include exercising more and making certain diet changes.    Don't consume foods and drinks that may irritate the bladder. These can include  alcohol and caffeinated drinks.    Quit smoking. Smoking and other tobacco use can lead to chronic cough that strains the pelvic floor muscles. Smoking may also damage the bladder and urethra. Talk with your provider about treatments or methods you can use to quit smoking.    If drinking large amounts of fluid causes you to have symptoms, you may be advised to limit your fluid intake. You may also be advised to drink most of your fluids during the day and to limit fluids at night.    If you re worried about urine leakage or accidents, you may wear absorbent pads to catch urine. Change the pads often. This helps reduce discomfort. It may also reduce the risk of skin or bladder infections.  Follow-up care  Follow up with your healthcare provider, or as directed. It may take some to find the right treatment for your problem. Your treatment plan may include special therapies or medicines. Certain procedures or surgery may also be options. Be sure to discuss any questions you have with your provider.  When to seek medical advice  Call the healthcare provider right away if any of these occur:    Fever of 100.4 F (38 C) or higher, or as directed by your provider    Bladder pain or fullness    Abdominal swelling    Nausea or vomiting    Back pain    Weakness, dizziness or fainting  Date Last Reviewed: 10/1/2017    6452-2981 [employer]. [address] [place] [address]. All rights reserved. This information is not intended as a substitute for professional medical care. Always follow your healthcare professional's instructions.       Advance Directive  Patient s advance directive was discussed and I am comfortable with the patient s wishes.  Patient Education   Personalized Prevention Plan  You are due for the preventive services outlined below.  Your care team is available to assist you in scheduling these services.  If you have already completed any of these items, please share that information with  your care team to update in your medical record.  Health Maintenance   Topic Date Due     ZOSTER VACCINES (1 of 2) 06/11/2003     DXA SCAN  06/11/2018     MAMMOGRAM  08/27/2021     MEDICARE ANNUAL WELLNESS VISIT  11/20/2021     FALL RISK ASSESSMENT  11/20/2021     LIPID  10/04/2023     TD 18+ HE  11/13/2025     ADVANCE CARE PLANNING  11/20/2025     COLORECTAL CANCER SCREENING  04/01/2029     HEPATITIS C SCREENING  Completed     Pneumococcal Vaccine: 65+ Years  Completed     INFLUENZA VACCINE RULE BASED  Completed     Pneumococcal Vaccine: Pediatrics (0 to 5 Years) and At-Risk Patients (6 to 64 Years)  Aged Out     HEPATITIS B VACCINES  Aged Out

## 2021-06-13 NOTE — TELEPHONE ENCOUNTER
Dr. Godinez,  Please review patient's lab results and advise.  They are finalized in the patient's chart as of 11/20/2020.  Thank you.  Kendra BROOKS CMA/GEETA....................12:47 PM

## 2021-06-13 NOTE — PROGRESS NOTES
Assessment and Plan:     Patient has been advised of split billing requirements and indicates understanding: Yes  1. Encounter for screening mammogram for breast cancer urged her to do annual mammograms she needs 3D ones  - Mammo Screening Bilateral; Future    2. Age-related osteoporosis without current pathological fracture  She can get her baseline DEXA scan done with her mammogram  - DXA Bone Density Scan; Future    3. Encounter for colonoscopy due to history of colon cancer  She did have a colonoscopy in 2019 but it was incomplete because of poor prep.  She has a very hard time with the prep because she throws it up.  She can make 1 more attempt   - Ambulatory referral for Colonoscopy    4. Mixed hyperlipidemia    - Lipid Cascade FASTING  - Comprehensive Metabolic Panel  - HM1(CBC and Differential)  - Glycosylated Hemoglobin A1c    5. Hepatitis C antibody positive in blood   I see that in 2014 her hepatitis C antibody was positive, Of note rheumatoid factor was also positive probably was false positive.  She has no recollection of this.  I see other tests were done including HIV which were negative.  I will reorder it with a PCR test.  - Hepatitis C Antibody (Anti-HCV)  - Hepatitis C RNA Quantitation by RT-PCR    6. Encounter for tobacco use cessation counseling  Chantix along with nicotine.Counseled at length she is willing to quit she smokes half a pack a day  - varenicline (CHANTIX STARTING MONTH BOX) 0.5 mg (11)- 1 mg (42) tablet; 1 wk before you stop smoking take 0.5mg daily on days 1-3, 0.5mg 2 times each day on days 4-7, then 1mg 2 times daily.  Dispense: 53 tablet; Refill: 0  - varenicline (CHANTIX) 1 mg tablet; Take 1 tab by mouth two times a day. Take after eating with a full glass of water. NOTE: Dispense as maintenance for refills only.  Dispense: 60 tablet; Refill: 2  - nicotine, polacrilex, (NICORETTE) 2 mg lozenge; Apply 1 lozenge (2 mg total) to the mouth or throat as needed for smoking  cessation.  Dispense:  ; Refill: 0    7. Urge incontinence of urine  Her symptoms are suggestive of hyperactive bladder we will start low-dose oxybutynin explained to her about dry mouth as a side effect.  If it does not help we could consider another medication  - oxybutynin (DITROPAN XL) 5 MG ER tablet; Take 1 tablet (5 mg total) by mouth daily.  Dispense: 34 tablet; Refill: 3  - Urinalysis-UC if Indicated  - Culture, Urine    8. Elevated BP without diagnosis of hypertension  On rechecking her blood pressure was still elevated we will have her return in 3 months.  She said she has a home monitor that says it is normal.  Prior blood pressure readings were were normal.    9. Rotator cuff injury, left, initial encounter  Most likely a rotator cuff injury degeneration        The patient's current medical problems were reviewed.    I have had an Advance Directives discussion with the patient.  The following health maintenance schedule was reviewed with the patient and provided in printed form in the after visit summary:   Health Maintenance   Topic Date Due     ZOSTER VACCINES (1 of 2) 06/11/2003     DXA SCAN  06/11/2018     MAMMOGRAM  08/27/2021     MEDICARE ANNUAL WELLNESS VISIT  11/20/2021     FALL RISK ASSESSMENT  11/20/2021     LIPID  10/04/2023     ADVANCE CARE PLANNING  10/04/2023     TD 18+ HE  11/13/2025     COLORECTAL CANCER SCREENING  04/01/2029     HEPATITIS C SCREENING  Completed     Pneumococcal Vaccine: 65+ Years  Completed     INFLUENZA VACCINE RULE BASED  Completed     Pneumococcal Vaccine: Pediatrics (0 to 5 Years) and At-Risk Patients (6 to 64 Years)  Aged Out     HEPATITIS B VACCINES  Aged Out        Subjective:   Chief Complaint: Shantelle Taveras is an 65 y.o. female here for an Annual Wellness visit.   HPI: Very pleasant -American woman who has no chronic disease is not on any medications former patient of Dr. Lara here for her annual wellness visit she is in her usual state of health.  She  has always had a low BMI and her weight has been stable.  She eats 3 meals a day is very active she does smoke and she does drink moderate amounts of alcohol.    Review of Systems:  Please see above.  The rest of the review of systems are negative for all systems.    Patient Care Team:  Brigida Godinez MD as PCP - General (Internal Medicine)  Javier Blankenship MD as Assigned PCP     Patient Active Problem List   Diagnosis     Chest Pain     Otitis Media     Acute Sinusitis     Cerumen Impaction     Upper Respiratory Infection     Pneumonia     Imaging Studies Nonspecific Abnormal Findings Breast     Mammogram Inconclusive Due To Dense Breasts     Limb Pain     History of colonic polyps     No past medical history on file.   Past Surgical History:   Procedure Laterality Date     BREAST BIOPSY Right 2002?     HYSTERECTOMY       OOPHORECTOMY       LA TOTAL ABDOM HYSTERECTOMY      Description: Total Abdominal Hysterectomy;  Recorded: 01/31/2011;  Comments: 2003      No family history on file.   Social History     Socioeconomic History     Marital status: Single     Spouse name: Not on file     Number of children: Not on file     Years of education: Not on file     Highest education level: Not on file   Occupational History     Not on file   Social Needs     Financial resource strain: Not on file     Food insecurity     Worry: Not on file     Inability: Not on file     Transportation needs     Medical: Not on file     Non-medical: Not on file   Tobacco Use     Smoking status: Current Every Day Smoker     Smokeless tobacco: Never Used   Substance and Sexual Activity     Alcohol use: Not on file     Drug use: Not on file     Sexual activity: Not on file   Lifestyle     Physical activity     Days per week: Not on file     Minutes per session: Not on file     Stress: Not on file   Relationships     Social connections     Talks on phone: Not on file     Gets together: Not on file     Attends Orthodox service: Not on  "file     Active member of club or organization: Not on file     Attends meetings of clubs or organizations: Not on file     Relationship status: Not on file     Intimate partner violence     Fear of current or ex partner: Not on file     Emotionally abused: Not on file     Physically abused: Not on file     Forced sexual activity: Not on file   Other Topics Concern     Not on file   Social History Narrative     Not on file      Current Outpatient Medications   Medication Sig Dispense Refill     fexofenadine (ALLER-EASE) 180 MG tablet Take 1 tablet (180 mg total) by mouth daily. 30 tablet 1     No current facility-administered medications for this visit.       Objective:   Vital Signs:   Visit Vitals  /82 (Patient Site: Left Arm, Patient Position: Sitting, Cuff Size: Adult Large)   Pulse 72   Temp 98  F (36.7  C) (Tympanic)   Ht 5' 2.25\" (1.581 m)   Wt 104 lb (47.2 kg)   SpO2 98%   BMI 18.87 kg/m           VisionScreening:  No exam data present     PHYSICAL EXAM  Physical Examination: General appearance - alert, well appearing, and in no distress  Mental status - alert, oriented to person, place, and time  Neck - supple, no significant adenopathy  Lymphatics - no palpable lymphadenopathy, no hepatosplenomegaly  Chest - clear to auscultation, no wheezes, rales or rhonchi, symmetric air entry  Heart - normal rate, regular rhythm, normal S1, S2, no murmurs, rubs, clicks or gallops  Abdomen - soft, nontender, nondistended, no masses or organomegaly  Breasts - breasts appear normal, no suspicious masses, no skin or nipple changes or axillary nodes  Back exam - full range of motion, no tenderness, palpable spasm or pain on motion  Musculoskeletal - no joint tenderness, deformity or swelling  Extremities - peripheral pulses normal, no pedal edema, no clubbing or cyanosis  Skin - normal coloration and turgor, no rashes, no suspicious skin lesions noted    She is post total abdominal hysterectomy and " oophorectomy  Assessment Results 11/20/2020   Activities of Daily Living 1 - Full function   Instrumental Activities of Daily Living No help needed   Get Up and Go Score Less than 12 seconds   Mini Cog Total Score 5   Some recent data might be hidden     A Mini-Cog score of 0-2 suggests the possibility of dementia, score of 3-5 suggests no dementia        Identified Health Risks:     The patient reports that she drinks more than one alcoholic drink per day but denies binge or excessive drinking. She was counseled and given information about possible harmful effects of excessive alcohol intake.  The patient reports that she has difficulty with activities of daily living. I have asked that the patient make a follow up appointment in  weeks where this issue will be further evaluated and addressed.  Information on urinary incontinence and treatment options given to patient.  Patient's advanced directive was discussed and I am comfortable with the patient's wishes.

## 2021-06-13 NOTE — TELEPHONE ENCOUNTER
Hunter Pepper    You should check with where you are having the colonoscopy done to get direction on the prep kit. Let us know if you have any further questions. Thanks.    Marilee Sol, [facility]

## 2021-06-15 NOTE — PATIENT INSTRUCTIONS - HE
She can follow-up with the GI continue to take alendronate she is up-to-date on her other health maintenance.  We counseled on alcohol and smoking.  She still smoking I told her alcohol is bigger prior to with her hepatitis C.

## 2021-06-15 NOTE — PROGRESS NOTES
[facility] Follow Up Note    Shantellebret Taveras   62 y.o. female    Date of Visit: 1/25/2018    Chief Complaint   Patient presents with     Groin Pain     groin pain on both sides, L side worse than R     Subjective  This is a 62-year-old lady who comes in with bilateral groin and back discomfort.  Last summer she was having back issues and was seen at the spine clinic.  They had talked about an SI cortisone injection but instead she elected to do some physical therapy which did seem to help her lower back pain.  She had been doing well until about 1-2 weeks ago when she developed pain in the groin areas with the right being a little worse than the left.  The pain is now radiating up the lumbosacral region again on both sides.  She seems to be fine when she is walking but other movement changes seem to aggravate the discomfort.  She is not having any GI or  symptoms at this time.  The pain is not necessarily getting worse but neither is it making any improvement.  She has otherwise been feeling fine and in her usual state of health.    ROS A comprehensive review of systems was performed and was otherwise negative    Medications, allergies, and problem list were reviewed and updated    Exam  General Appearance:   On examination her blood pressure is 120/82.  Weight is 105 pounds and height is 63 inches.  BMI is 18.60.    Heart is in a sinus rhythm with a rate of 80 and no ectopy.    Abdomen shows no particular palpable tenderness on either side in the lower quadrants.  No distention and no masses.  I do not detect any ventral wall hernias.    There is slight tenderness over the lower back lateral to the LS spine.    The patient is alert and oriented ×3.      Assessment/Plan  1. Groin pain     2. Back pain       Low back and groin pain.  This seems more likely musculoskeletal than anything else.  I offered her the opportunity for physical therapy but she would prefer not to do this at this  time.  I suggested she could return to the spine clinic and reconsider the option of an injection.  After discussion she would prefer to give it another 7-10 days to see how it is feeling and call me at that point with an update.  If she gets worse or is not improving we probably will refer her back to the spine clinic for reassessment.    Total time of this office visit was 25 minutes with greater than 50% of the time spent in care coordination of patient counseling.      Javier Blankenship MD      Current Outpatient Prescriptions on File Prior to Visit   Medication Sig     albuterol (PROVENTIL HFA;VENTOLIN HFA) 90 mcg/actuation inhaler Inhale 1-2 puffs every 4 (four) hours as needed for wheezing.     cyclobenzaprine (FLEXERIL) 10 MG tablet Take 1 tablet (10 mg total) by mouth 3 (three) times a day as needed for muscle spasms.     doxycycline (ADOXA) 100 MG tablet Take 1 tablet (100 mg total) by mouth 2 (two) times a day.     fexofenadine (ALLEGRA) 180 MG tablet Take 1 tablet (180 mg total) by mouth daily.     fexofenadine-pseudoephedrine (ALLEGRA-D 24) 180-240 mg per 24 hr tablet Take 1 tablet by mouth daily.     gabapentin (NEURONTIN) 100 MG capsule Take 100 mg by mouth daily. Increase dose as directed by chart.  May increase to 1 tabs 3 times daily     loratadine (CLARITIN) 10 mg tablet TAKE 1 TABLET (10 MG) BY ORAL ROUTE ONCE DAILY AS NEEDED     nicotine (NICODERM CQ) 7 mg/24 hr Place 1 patch on the skin daily.     tiZANidine (ZANAFLEX) 4 MG tablet Take 1 tablet at qhs prn for muscle spasm.     No current facility-administered medications on file prior to visit.      Allergies   Allergen Reactions     Codeine      Social History   Substance Use Topics     Smoking status: Current Every Day Smoker     Smokeless tobacco: Never Used     Alcohol use None

## 2021-06-15 NOTE — PROGRESS NOTES
Office Visit - Follow Up   Shantelle Taveras   65 y.o. female    Date of Visit: 2/25/2021         Assessment and Plan   1. Chronic hepatitis C without hepatic coma (H)  She has a high viral load, normal liver tests, some fibrosis on the FibroScan and the hepatitis C.  She was supposed to follow-up and discuss genotype is 3 her eligibility for antivirals.  It looks like she does qualify given the fibrosis.  She would be on mavyret she was given a phone number for GI to call back  2. Age-related osteoporosis without current pathological fracture  She does have osteoporosis this was explained alendronate was sent to the pharmacy she has to pick it up and she is willing to do it    3. History of colonic polyps    She is very upset that these noticed any polyp and did not take it out.  It appears that the clinical diagnosis of hyperplastic polyp was made.  I did try and reassure patient that she is extremely upset with the GI doctor.  She was told to come back in 10 years    She is still smoking and drinking alcohol.  Counseled at length.  Recommend prioritizing alcohol with elimination because of her hepatitis.  She can come back for her next physical and follow-up with GI.    I reassured her blood pressure is good no labs are needed today.  I will vaccinated for hepatitis A and hepatitis B.  She can return in 6 to 12 months for repeat dosing.    Return in about 1 year (around 2/25/2022) for Annual physical.     History of Present Illness   This 65 y.o. old   Chief Complaint   Patient presents with     Follow-up   .  She did see GI I reviewed the notes with her.  She feels well she is here for follow-up to follow-up on her hypertension, hepatitis C and osteoporosis    Review of Systems: A comprehensive review of systems was negative except as noted.     Medications, Allergies and Problem List   Reviewed, reconciled and updated  Patient Active Problem List   Diagnosis     Chest Pain     Otitis Media     Acute Sinusitis  "    Cerumen Impaction     Upper Respiratory Infection     Pneumonia     Imaging Studies Nonspecific Abnormal Findings Breast     Mammogram Inconclusive Due To Dense Breasts     Limb Pain     History of colonic polyps        Physical Exam   General Appearance:       /80 (Patient Site: Left Arm, Patient Position: Sitting, Cuff Size: Adult Regular)   Pulse 78   Ht 5' 2.25\" (1.581 m)   Wt 108 lb 9.6 oz (49.3 kg)   SpO2 97%   BMI 19.70 kg/m      General appearance - alert, well appearing, and in no distress  Mental status - alert, oriented to person, place, and time  Neck - supple, no significant adenopathy  Lymphatics - no palpable lymphadenopathy, no hepatosplenomegaly  Chest - clear to auscultation, no wheezes, rales or rhonchi, symmetric air entry  Heart - normal rate, regular rhythm, normal S1, S2, no murmurs, rubs, clicks or gallops                                                                                         Additional Information   Current Outpatient Medications   Medication Sig Dispense Refill     oxybutynin (DITROPAN XL) 5 MG ER tablet Take 1 tablet (5 mg total) by mouth daily. 34 tablet 3     varenicline (CHANTIX) 1 mg tablet Take 1 tab by mouth two times a day. Take after eating with a full glass of water. NOTE: Dispense as maintenance for refills only. 60 tablet 2     alendronate (FOSAMAX) 70 MG tablet Take 1 tablet (70 mg total) by mouth every 7 days. Take in the morning on an empty stomach with a full glass of water 30 minutes before food 4 tablet 11     fexofenadine (ALLER-EASE) 180 MG tablet Take 1 tablet (180 mg total) by mouth daily. 30 tablet 1     nicotine, polacrilex, (NICORETTE) 2 mg lozenge Apply 1 lozenge (2 mg total) to the mouth or throat as needed for smoking cessation.  0     varenicline (CHANTIX STARTING MONTH BOX) 0.5 mg (11)- 1 mg (42) tablet 1 wk before you stop smoking take 0.5mg daily on days 1-3, 0.5mg 2 times each day on days 4-7, then 1mg 2 times daily. 53 tablet " 0     No current facility-administered medications for this visit.      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    Smokes ten cigs a day     Hard liquor every other day     Has one son .       reports that she has been smoking. She has never used smokeless tobacco. No history on file for alcohol and drug.   No past medical history on file.        Time: total time spent with the patient was 25 minutes of which >50% was spent in counseling and coordination of care     Brigida Godinez MD

## 2021-06-15 NOTE — PROGRESS NOTES
Assessment / Plan:     Diagnoses and all orders for this visit:    Lumbalgia  -     OPS Joint Injection Sacroiliac Joint Unilateral; Future; Expected date: 1/29/18    Chronic right SI joint pain  -     OPS Joint Injection Sacroiliac Joint Unilateral; Future; Expected date: 1/29/18    Lumbar radiculitis  -     OPS Joint Injection Sacroiliac Joint Unilateral; Future; Expected date: 1/29/18    Myofascial pain  -     OPS Joint Injection Sacroiliac Joint Unilateral; Future; Expected date: 1/29/18          Shantelle Taveras is a 62 y.o. y.o. female with past medical history significant for asthma who presents today for follow-up regarding bilateral low back pain with the right side being worse than the left side.  Patient today also reports bilateral groin pain.  Patient has tenderness at the right SI joint with positive Jan test that is concerning for right sacroiliitis.  Patient also has intermittent radicular pain to the right posterior thigh that could be due to moderate disc bulge at the L4-L5 moderately compressing the traversing L L5 nerve root.  Patient's most concern today is the right lower back, and the bilateral groin pain.  Patient has negative hip impingement bilaterally ruling out hip pathology.  Bilateral groin pain could be myofascial, and or referred pain from the SI joint.  Lumbar MRI does not show disc pathology at the L1-L2 that could explain the bilateral groin pain with the right side being worse than the left side.  I recommend to proceed with right SI joint therapeutics steroid injection to help with the right-sided low back pain, and hopefully this will help reducing the groin pain on the right.  Patient will follow up with primary care provider for further evaluation of the bilateral groin pain.    A shared decision making plan was used.  The patient's values and choices were respected.  The following represents what was discussed and decided upon by the physician and the patient.      1.   DIAGNOSTIC TESTS: I reviewed the lumbar MRI imaging and the report with the patient today.  He verbalizes understanding.  2.  PHYSICAL THERAPY: Patient will continue on home exercises.    3.  MEDICATIONS: Patient will continue on tizanidine 4 mg as needed for muscle spasm.  4.  INTERVENTIONS: Right SI joint therapeutics or injection.  5.  PATIENT EDUCATION: I thoroughly discussed the plan with the patient today.  She verbalizes understanding and agrees with the plan.  6.  FOLLOW-UP: Patient will follow up with me in 2  weeks.  All questions were answered.      JESSE Purvis, MPA-C      Subjective:     Shantelle Taveras is a 62 y.o. female who presents today for follow-up regarding bilateral low back pain with the right side being worse than the left side.  Today patient returns to the clinic reporting that she underwent physical therapy and had significant improvement of her symptoms, however, she had reappearance of her symptoms 1-2 weeks ago, and developed new symptoms of bilateral groin pain with the right side being worse than the left side.  Patient was seen by her primary care provider where he ruled out GI, or  symptoms.  At her last visit she had tenderness at the right SI joint, and positive right Jan test that were concerning for right sacroiliitis.  Today patient reports right-sided low back pain that is worse than the left-sided low back pain with intermittent, occasional radicular pain to the right posterior thigh.  Her lumbar MRI that were done in July 2017 showed mild disc bulge at the L5-S1 that contacts the bilateral traversing S1 nerve root and minimally compress the traversing left S1 nerve root.  There is also a moderate disc bulge at the L4-L5 compressing the bilateral traversing L5 nerve root.  Today patient stated that her most concern is the right-sided low back pain and the bilateral groin pain.  She does not want to repeat physical therapy since she had it in the past.Patient  denies urinary or bowel incontinence, unintentional weight loss, saddle anesthesia, fever or chills, balance difficulties.      Review of Systems:  Bilateral low back pain with the right side being worse than the left side.  Bilateral groin pain. Patient denies urinary or bowel incontinence, unintentional weight loss, saddle anesthesia, fever or chills, balance difficulties.       Objective:   CONSTITUTIONAL:  Vital signs as above.  No acute distress.  The patient is well nourished and well groomed.    PSYCHIATRIC:  The patient is awake, alert, oriented to person, place and time.  The patient is answering questions appropriately with clear speech.  Normal affect.  SKIN:  Skin over the face, posterior torso, bilateral upper and lower extremities is clean, dry, intact without rashes.  MUSCULOSKELETAL:  Gait is non-antalgic.  The patient is able to heel and toe walk without any difficulty.  Mild tenderness over the L4-L5, L5-S1 lumbar paraspinal muscles.  Tenderness present at the right SI joint.  Positive Jan test.  The patient has 5/5 strength for the bilateral hip flexors, knee flexors/extensors, ankle dorsiflexors/plantar flexors, ankle evertors/invertors.    NEUROLOGICAL:  2/4 patellar, medial hamstring, achilles reflexes which are symmetric bilaterally.  No ankle clonus bilaterally.  Sensation to light touch is intact in the bilateral L4, L5, and S1 dermatomes.  Positive right straight leg raise.  Negative Jan test on the left.  Negative hip impingement bilaterally.     RESULTS:      [facility]  MR LUMBAR SPINE WO CONTRAST  7/5/2017 11:32 AM       INDICATION: Intermittent low back pain with symptoms of neuropathy into the right leg.  TECHNIQUE: Routine.  CONTRAST: None  COMPARISON: None.      FINDINGS: Nomenclature is based on 5 lumbar type vertebral bodies. Normal vertebral body heights. Mild levoconvex curvature of the lumbar spine. No suspicious bone marrow signal abnormality. No pars  defect. The conus tip is identified at L1-L2.. Grossly   normal paraspinal soft tissues. No abdominal aortic aneurysm. The visualized portions of the bony pelvis are normal for age.      T12-L1: Normal disc height and signal. No herniation. No facet arthropathy. No spinal canal stenosis. No right neural foraminal stenosis. No left neural foraminal stenosis.      L1-L2: Normal disc height and signal. No herniation. No facet arthropathy. No spinal canal stenosis. No right neural foraminal stenosis. No left neural foraminal stenosis.      L2-L3: Normal disc height and signal. Mild asymmetric left disc bulge which mildly contacts the traversing left L3 nerve root. No herniation. No facet arthropathy. No spinal canal stenosis. No right neural foraminal stenosis. No left neural foraminal   stenosis.      L3-L4: Mild loss of disc height and moderate loss of T2-weighted signal in the disc. Moderate asymmetric left disc bulge which contacts the bilateral traversing L4 nerve roots. Small left foraminal/far lateral disc protrusion which contacts the   undersurface of the extraforaminal portion of the exiting left L3 nerve root. No facet arthropathy. No spinal canal stenosis. No right neural foraminal stenosis. No left neural foraminal stenosis.      L4-L5: Mild to moderate loss of disc height and moderate loss of T2-weighted signal in the disc. There is a moderate disc bulge which may mildly compress the bilateral traversing L5 nerve roots within the lateral recesses. There is severe bilateral   lateral recess stenosis. No herniation. Mild bilateral facet arthropathy. Mild spinal canal stenosis. No right neural foraminal stenosis. No left neural foraminal stenosis.      L5-S1: Normal disc height. Moderate loss of T2-weighted signal in the disc. Mild disc bulge which contacts the bilateral traversing S1 nerve roots. It may minimally compress the traversing left S1 nerve root. No facet arthropathy. No spinal canal   stenosis. No  right neural foraminal stenosis. No left neural foraminal stenosis.      IMPRESSION:   CONCLUSION:  1.  At L4-L5, there is a moderate disc bulge which may mildly compress the bilateral traversing L5 nerve roots in the lateral recesses.       2.  At L3-L4, there is a small left foraminal/far lateral disc protrusion which contacts the undersurface of the extraforaminal portion of the exiting left L3 nerve root.      3.  At L5-S1, there is mild disc bulge which contacts the bilateral traversing S1 nerve roots and may minimally compress the traversing left S1 nerve root.

## 2021-06-16 PROBLEM — B18.2 HEPATITIS C, CHRONIC (H): Status: ACTIVE | Noted: 2021-02-25

## 2021-06-16 PROBLEM — Z86.0100 HISTORY OF COLONIC POLYPS: Status: ACTIVE | Noted: 2019-04-03

## 2021-06-16 NOTE — PROGRESS NOTES
[facility] Certification Request    February 21, 2018      Patient: Shantelle Taveras  MR Number: [id number]  YOB: 1955  Date of Visit: 2/21/2018      Dear Shelli Strickland:    Thank you for this referral.   We are seeing Shantelle Taveras for Physical Therapy of her R SI joint pain and LBP.    Medicare and/or Medicaid requires physician review and approval of the treatment plan. Please review the plan of care and verify that you agree with the therapy plan of care by co-signing this note.      Plan of Care  Authorization / Certification Start Date: 02/21/18  Authorization / Certification End Date: 04/22/18  Authorization / Certification Number of Visits: 6  Communication with: Referral Source  Patient Related Instruction: Nature of Condition;Treatment plan and rationale;Self Care instruction;Body mechanics;Next steps;Expected outcome;Posture;Basis of treatment;Precautions  Times per Week: 1  Number of Weeks: 8  Number of Visits: 6  Therapeutic Exercise: ROM;Stretching;Strengthening  Neuromuscular Reeducation: kinesio tape;posture;balance/proprioception;TNE;core  Manual Therapy: soft tissue mobilization;joint mobilization;strain counterstrain;muscle energy;myofascial release  Modalities: electrical stimulation;TENS;ultrasound;cold pack;hot pack (prn)  Gait Training: to reduce pain and improve function  Equipment: theraband    Goals:  Pt. will be independent with home exercise program in : 4 weeks (to self-manage pain and improve function)  Patient will demonstrate proper body mechanics : for lifting;for bending;using 40#;with less pain;for work;in other weeks  other weeks: 8 weeks  Patient will sit: 60 minutes;with less pain;in other weeks;for work (with less than 3/10 pain)  other weeks: 8 weeks  No Data Recorded      If you have any questions or concerns, please don't hesitate to call.    Sincerely,      Margo Ramos, PT        Physician recommendation:     ___ Follow therapist's  recommendation        ___ Modify therapy      *Physician co-signature indicates they certify the need for these services furnished within this plan and while under their care.        [facility]   [facility]-Pelvic Initial Evaluation    Patient Name: Shantelle Taveras  Date of evaluation: 2/21/2018  Referral Diagnosis: Lumbalgia,   Chronic right SI joint pain [M53.3, G89.29]       Lumbar radiculitis [M54.16]       Myofascial pain [M79.1]       Sleep disturbance [G47.9]         Referring provider: Shelli Strickland PA-C  Visit Diagnosis:     ICD-10-CM    1. Pain of right sacroiliac joint M53.3    2. Generalized muscle weakness M62.81    3. Bilateral hip pain M25.551     M25.552        Assessment:      Impairments in  pain, posture, ROM, joint mobility, strength, ADL's, gait/locomotion and balance  Patient's signs and symptoms are consistent with R SI joint pain. Pt presents with + R SI joint testing, abdominal and hip weakness, neg neurodynamic testing and pelvic malalignment that was corrected with MET. Her pain limits her ability to stand, sit, lift, bend and work..  The POC is dynamic and will be modified on an ongoing basis.  Barriers to achieving goals as noted in the assessment section may affect outcome.  Prognosis to achieve goals is  good   Skilled PT is required to reduce pain and improve function.  Pt. is appropriate for skilled PT intervention as outlined in the Plan of Care (POC).  Pt. is a good candidate for skilled PT services to improve pain levels and function.  Plan of care and goals were established in collaboration with patient.     Goals:  Pt. will be independent with home exercise program in : 4 weeks (to self-manage pain and improve function)  Patient will demonstrate proper body mechanics : for lifting;for bending;using 40#;with less pain;for work;in other weeks  other weeks: 8 weeks  Patient will sit: 60 minutes;with less pain;in other weeks;for work (with less than 3/10 pain)  other weeks:  8 weeks  No Data Recorded    Patient's expectations/goals are realistic.    Barriers to Learning or Achieving Goals:  No Barriers.       Plan / Patient Instructions:        Plan of Care:   Authorization / Certification Start Date: 02/21/18  Authorization / Certification End Date: 04/22/18  Authorization / Certification Number of Visits: 6  Communication with: Referral Source  Patient Related Instruction: Nature of Condition;Treatment plan and rationale;Self Care instruction;Body mechanics;Next steps;Expected outcome;Posture;Basis of treatment;Precautions  Times per Week: 1  Number of Weeks: 8  Number of Visits: 6  Therapeutic Exercise: ROM;Stretching;Strengthening  Neuromuscular Reeducation: kinesio tape;posture;balance/proprioception;TNE;core  Manual Therapy: soft tissue mobilization;joint mobilization;strain counterstrain;muscle energy;myofascial release  Modalities: electrical stimulation;TENS;ultrasound;cold pack;hot pack (prn)  Gait Training: to reduce pain and improve function  Equipment: theraband    Plan for next visit: progress abdominal/hip strengthening, continue to assess leg length and pelvic landmarks     Subjective:         Social information:   Occupation:PCA, homemaking   Work Status:Working part time      History of Present Illness:    Shantelle is a 62 y.o. female who presents to therapy today with complaints of R buttock pain and eden anterior hip pain. Date of onset/duration of symptoms is a little over 1 year.  She previously had 2 PT visits for LBP in July 2017 with relief of symptoms.  A few months ago the pain started up again. She tried to do some of the exercises the previous PT gave her but it did not seem to help.  Pt had a recent R SI join injection on 2/6/18. Pt reports only about 5% improvement with the relief. Onset was gradual. Symptoms are getting better since the injection. 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    Pain Ratin  Pain rating at best: 0  Pain rating at worst: 10  Pain description: aching and dull, denies any n/t now    Functional limitations are described as occurring with:   Stand 30 min  Sit 5 min  Walk 1 hour  Sleep- wakes 2x/night  Change sleeping positions  Transitions  Bend  Lift  Kneel/squat  Carry laundry/groceries    Patient reports benefit from:  ice, walking, exercise         Objective:      Note: Items left blank indicates the item was not performed or not indicated at the time of the evaluation.    Patient Outcome Measures :    Modified Oswestry Low Back Pain Disablity Questionnaire  in %: 44   Scores range from 0-100%, where a score of 0% represents minimal pain and maximal function. The minimal clinically important difference is a score reduction of 12%.    Examination  1. Pain of right sacroiliac joint     2. Generalized muscle weakness     3. Bilateral hip pain       Involved side: Right buttock and eden anterior thighs    Supine: R ASIS deep, R leg appears longer    Lumbar ROM:    Date: 2018     *Indicate scale AROM AROM AROM   Lumbar Flexion To ankles     Lumbar Extension mild      Right Left Right Left Right Left   Lumbar Sidebending WNL WNL       Lumbar Rotation WNL WNL       Thoracic Flexion      Thoracic Extension      Thoracic Sidebending         Thoracic Rotation           Lower Extremity Strength:     Date: 2018     LE strength/5 Right Left Right Left Right Left   Hip Flexion (L1-3) 5 5       Hip Extension (L5-S1) 3+ 3+       Hip Abduction (L4-5)         Hip Adduction (L2-3)         Hip External Rotation 3+ 3+       Hip Internal Rotation         Knee Extension (L3-4) 5 5       Knee Flexion 4+ 4       Ankle Dorsiflexion (L4-5) 5 5       Great Toe Extension (L5)         Ankle Plantar flexion (S1)         Abdominals        Sensation           Reflex Testing  Lumbar Dermatomes Right Left UE Reflexes Right Left   Iliac Crest and Groin (L1)   Biceps (C5-6)     Anterior  Medial Thigh (L2)   Brachioradialis (C5-6)     Anterior Thigh, Medial Epicondyle Femur (L3)   Triceps (C7-8)     Lateral Thigh, Anterior Knee, Medial Leg/Malleolus (L4)   Jamia s test     Lateral Leg, Dorsal Foot (L5)   LE Reflexes     Lateral Foot (S1)   Patellar (L3-4)     Posterior Leg (S2)   Achilles (S1-2)     Other:   Babinski Response       Palpation: hypertonicity on L piriformis and tender eden piriformis and eden lumbar paraspinals    Lumbar Special Tests:     Lumbar Special Tests Right Left SI Tests Right  Left   Quadrant test - - SI Compression + -   Straight leg raise   SI Distraction + -   Crossover response   POSH Test     Slump - - Sacral Thrust + -   Sit-up test  FADIR - -   Trunk extensor endurance test  Thigh thrust for SI joint pain + -   Prone instability test  Other: MCKENNA - -   Pubic shotgun  Other: Scour - -         Passive Mobility - Joint Integrity:  Pain with central PAs to lumbar spine     LE Screen:  hip PROM WNL eden    Treatment Today     TREATMENT MINUTES COMMENTS   Evaluation 20 -lumbar spine/ SI joint   Self-care/ Home management     Manual therapy     Neuromuscular Re-education     Therapeutic Activity     Therapeutic Exercises 30 -see exercise flow sheet  -educated on POC, diagnosis and HEP  -MET isometric hip add/abd in hooklying (shot gun technique) 3x8 sec hold with symmetrical leg length and ASIS in supine after     Gait training     Modality__________________                Total 50    Blank areas are intentional and mean the treatment did not include these items.     PT Evaluation Code: (Please list factors)  Patient History/Comorbidities: none  Examination: lumbar  Clinical Presentation: stable  Clinical Decision Making: low    Patient History/  Comorbidities Examination  (body structures and functions, activity limitations, and/or participation restrictions) Clinical Presentation Clinical Decision Making (Complexity)   No documented Comorbidities or personal factors 1-2  Elements Stable and/or uncomplicated Low   1-2 documented comorbidities or personal factor 3 Elements Evolving clinical presentation with changing characteristics Moderate   3-4 documented comorbidities or personal factors 4 or more Unstable and unpredictable High                Margo Ramos, PT, DPT  2/21/2018  7:52 AM

## 2021-06-16 NOTE — PROGRESS NOTES
[facility] Daily Progress     Patient Name: Shantelle Taveras  Date: 3/15/2018  Visit #:4/6  PTA visit #:  3  Referral Diagnosis: lumbalgia  Referring provider: Shelli SHAW  Visit Diagnosis:     ICD-10-CM    1. Pain of right sacroiliac joint M53.3    2. Generalized muscle weakness M62.81    3. Bilateral hip pain M25.551     M25.552    4. Acute right-sided low back pain with right-sided sciatica M54.41    5. Decreased ROM of lumbar spine M25.60          Assessment:   Cues for HEP/posture needed  Complaint with HEP  Pt would benefit with continuing skilled PT treatments    Goal Status: Ongoing  Pt. will be independent with home exercise program in : 4 weeks (to self-manage pain and improve function)  Patient will demonstrate proper body mechanics : for lifting;for bending;using 40#;with less pain;for work;in other weeks  other weeks: 8 weeks  Patient will sit: 60 minutes;with less pain;in other weeks;for work (with less than 3/10 pain)  other weeks: 8 weeks  No Data Recorded    Plan / Patient Education:     Continue POC  Progress ex as tolerated  Assess US continue x2 assess outcomes    Subjective:     Pain Ratin/10 intermittent c/o of right low back pain  Sore today and c/o abdominal pain  Reports relief with KT   Exercises 1x/day  Walking no problem  Sitting is uncomfortable      Objective:         Reviewed  Clams, sidelying hip ABD, bridging   Skin clear reapplied KT  Added US to right SI per flow sheet, POC  MFR to lumbar spine     Treatment Today   3/15/2018    TREATMENT MINUTES COMMENTS   Evaluation     Self-care/ Home management     Manual therapy 5 Prone MFR to lumbar spine/SI   Neuromuscular Re-education     Therapeutic Activity     Therapeutic Exercises 10 Ex per flow sheet/KT   Gait training     Modality______________US____ 10 + 3 set up US 1MHz 50% 1.0wt/cms to right SI lumbar              Total 28    Blank areas are intentional and mean the treatment did not include these items.        Cayetano Gonzalez  3/15/2018

## 2021-06-16 NOTE — PROGRESS NOTES
Assessment / Plan:     Diagnoses and all orders for this visit:    Lumbalgia  -     Ambulatory referral to Physical Therapy    Chronic right SI joint pain  -     Ambulatory referral to Physical Therapy    Lumbar radiculitis  -     Ambulatory referral to Physical Therapy    Myofascial pain  -     Ambulatory referral to Physical Therapy    Sleep disturbance  -     Ambulatory referral to Physical Therapy          Shantelle Taveras is a 62 y.o. y.o. female with past medical history significant for asthma who presents today for follow-up regarding bilateral low back pain with the right side being worse than the left side.   Patient right-sided low back pain likely due to the right sacroiliitis.  Patient significant improvement in the right-sided low back pain and the right groin pain after receiving right SI joint therapeutics steroid injection on February 9, 2018.  Patient also reports left-sided groin pain that it could be due to muscle sprain, strain or due to inguinal hernia.  No red flags.          A shared decision making plan was used.  The patient's values and choices were respected.  The following represents what was discussed and decided upon by the physician and the patient.      1.  DIAGNOSTIC TESTS: No imaging to review today.    2.  PHYSICAL THERAPY: Patient will start on physical therapy program for the lower back and for the SI joints.  I believe patient will benefit from a repeat of the physical therapy focusing on the SI joints bilaterally, as well as the lower back.    3.  MEDICATIONS: Patient will continue on tizanidine 4 mg as needed for muscle spasm.  Side effects of the medication discussed with patient today.    4.  INTERVENTIONS: No additional therapeutics or injection at this time.    5.  PATIENT EDUCATION: I thoroughly discussed the plan with the patient today.  She verbalizes understanding and agrees with the plan.    6.  FOLLOW-UP: Patient will follow up with me in 8  weeks.  All questions were  answered.      JESSE Purvis, MPA-C      Subjective:     Shantelle Taveras is a 62 y.o. female who presents today for follow-up regarding bilateral low back pain with the right side being worse than the left side.  Today patient returns to the clinic reporting improvement in her symptoms after receiving right SI therapeutic steroid injection on February 9 of 2018.  Patient stated that the right groin pain has resolved after receiving the injection as well as the right lower back pain.  Patient stated that she noticed left groin pain this morning.  She reports 2 out of 10 intensity pain in the left groin and mild pain in the lower back.  Patient denies radicular pain to the lower extremities. Her lumbar MRI that were done in July 2017 showed mild disc bulge at the L5-S1 that contacts the bilateral traversing S1 nerve root and minimally compress the traversing left S1 nerve root.  There is also a moderate disc bulge at the L4-L5 compressing the bilateral traversing L5 nerve root.  Patient has attended physical therapy in the past.  Patient was evaluated by her primary care provider to rule out GI or  symptoms. Patient denies urinary or bowel incontinence, unintentional weight loss, saddle anesthesia, fever or chills, balance difficulties.      Review of Systems:  Bilateral low back pain, left groin pain. Patient denies urinary or bowel incontinence, unintentional weight loss, saddle anesthesia, fever or chills, balance difficulties.       Objective:   CONSTITUTIONAL:  Vital signs as above.  No acute distress.  The patient is well nourished and well groomed.    PSYCHIATRIC:  The patient is awake, alert, oriented to person, place and time.  The patient is answering questions appropriately with clear speech.  Normal affect.  SKIN:  Skin over the face, posterior torso, bilateral upper and lower extremities is clean, dry, intact without rashes.  MUSCULOSKELETAL:  Gait is non-antalgic.  The patient is able to heel and  toe walk without any difficulty.  Mild tenderness over the L4-L5, L5-S1 lumbar paraspinal muscles.    No tenderness at the right SI joint.  Negative right Jan test.  The patient has 5/5 strength for the bilateral hip flexors, knee flexors/extensors, ankle dorsiflexors/plantar flexors, ankle evertors/invertors.    NEUROLOGICAL:  2/4 patellar, medial hamstring, achilles reflexes which are symmetric bilaterally.  No ankle clonus bilaterally.  Sensation to light touch is intact in the bilateral L4, L5, and S1 dermatomes.       RESULTS:      [facility]  MR LUMBAR SPINE WO CONTRAST  7/5/2017 11:32 AM       INDICATION: Intermittent low back pain with symptoms of neuropathy into the right leg.  TECHNIQUE: Routine.  CONTRAST: None  COMPARISON: None.      FINDINGS: Nomenclature is based on 5 lumbar type vertebral bodies. Normal vertebral body heights. Mild levoconvex curvature of the lumbar spine. No suspicious bone marrow signal abnormality. No pars defect. The conus tip is identified at L1-L2.. Grossly   normal paraspinal soft tissues. No abdominal aortic aneurysm. The visualized portions of the bony pelvis are normal for age.      T12-L1: Normal disc height and signal. No herniation. No facet arthropathy. No spinal canal stenosis. No right neural foraminal stenosis. No left neural foraminal stenosis.      L1-L2: Normal disc height and signal. No herniation. No facet arthropathy. No spinal canal stenosis. No right neural foraminal stenosis. No left neural foraminal stenosis.      L2-L3: Normal disc height and signal. Mild asymmetric left disc bulge which mildly contacts the traversing left L3 nerve root. No herniation. No facet arthropathy. No spinal canal stenosis. No right neural foraminal stenosis. No left neural foraminal   stenosis.      L3-L4: Mild loss of disc height and moderate loss of T2-weighted signal in the disc. Moderate asymmetric left disc bulge which contacts the bilateral traversing L4 nerve  roots. Small left foraminal/far lateral disc protrusion which contacts the   undersurface of the extraforaminal portion of the exiting left L3 nerve root. No facet arthropathy. No spinal canal stenosis. No right neural foraminal stenosis. No left neural foraminal stenosis.      L4-L5: Mild to moderate loss of disc height and moderate loss of T2-weighted signal in the disc. There is a moderate disc bulge which may mildly compress the bilateral traversing L5 nerve roots within the lateral recesses. There is severe bilateral   lateral recess stenosis. No herniation. Mild bilateral facet arthropathy. Mild spinal canal stenosis. No right neural foraminal stenosis. No left neural foraminal stenosis.      L5-S1: Normal disc height. Moderate loss of T2-weighted signal in the disc. Mild disc bulge which contacts the bilateral traversing S1 nerve roots. It may minimally compress the traversing left S1 nerve root. No facet arthropathy. No spinal canal   stenosis. No right neural foraminal stenosis. No left neural foraminal stenosis.      IMPRESSION:   CONCLUSION:  1.  At L4-L5, there is a moderate disc bulge which may mildly compress the bilateral traversing L5 nerve roots in the lateral recesses.       2.  At L3-L4, there is a small left foraminal/far lateral disc protrusion which contacts the undersurface of the extraforaminal portion of the exiting left L3 nerve root.      3.  At L5-S1, there is mild disc bulge which contacts the bilateral traversing S1 nerve roots and may minimally compress the traversing left S1 nerve root.

## 2021-06-16 NOTE — PROGRESS NOTES
[facility] Daily Progress     Patient Name: Shantelle Taveras  Date: 3/27/2018  Visit #:6  PTA visit #:  5  Referral Diagnosis: lumbalgia  Referring provider: Shelli SAHW  Visit Diagnosis:     ICD-10-CM    1. Pain of right sacroiliac joint M53.3    2. Generalized muscle weakness M62.81    3. Bilateral hip pain M25.551     M25.552    4. Acute right-sided low back pain with right-sided sciatica M54.41    5. Decreased ROM of lumbar spine M25.60          Assessment:   Independent with HEP  Reports decreased back pain, intermittent  Relief with ex and US    Goal Status: partially met  Pt. will be independent with home exercise program in : Met  Patient will demonstrate proper body mechanics : Progressing toward  other weeks: 8 weeks  Patient will sit: 60 minutes;Met  other weeks: 8 weeks  No Data Recorded    Plan / Patient Education:     Follow up with MD  No further PT scheduled    Subjective:     Pain Ratin/10 intermittent c/o of right low back pain feel a lot better  Reports relief US, ex and KT  Exercises 1x/day        Objective:     Independent with HEP  Lumbar ROM WFL  Reviewed  pisformis stretch and 4ped leg extension  Goals partially met   4% improvement with Oswestry score  Tenderness right SI joint          Treatment Today   3/27/2018    TREATMENT MINUTES COMMENTS   Evaluation     Self-care/ Home management     Manual therapy  Prone MFR to lumbar spine/SI   Neuromuscular Re-education     Therapeutic Activity     Therapeutic Exercises 13 Ex per flow sheet/KT   Gait training     Modality______________US____ 10 + 3 set up US 1MHz 50% 1.0wt/cms to right SI lumbar              Total 26    Blank areas are intentional and mean the treatment did not include these items.       Cayetano Gonzalez  3/27/2018

## 2021-06-16 NOTE — PROGRESS NOTES
[facility] Daily Progress     Patient Name: Shantelle Taveras  Date: 3/13/2018  Visit #:3  PTA visit #:  2  Referral Diagnosis: lumbalgia  Referring provider: Shelli SHAW  Visit Diagnosis:     ICD-10-CM    1. Pain of right sacroiliac joint M53.3    2. Generalized muscle weakness M62.81    3. Bilateral hip pain M25.551     M25.552    4. Acute right-sided low back pain with right-sided sciatica M54.41    5. Decreased ROM of lumbar spine M25.60          Assessment:   Cues for HEP/posture needed  Complaint with HEP  Pt would benefit with continuing skilled PT treatments    Goal Status: Ongoing  Pt. will be independent with home exercise program in : 4 weeks (to self-manage pain and improve function)  Patient will demonstrate proper body mechanics : for lifting;for bending;using 40#;with less pain;for work;in other weeks  other weeks: 8 weeks  Patient will sit: 60 minutes;with less pain;in other weeks;for work (with less than 3/10 pain)  other weeks: 8 weeks  No Data Recorded    Plan / Patient Education:     Continue POC  Progress ex as tolerated    Subjective:     Pain Ratin/10 intermittent c/o of right low back pain  Sore today and c/o abdominal pain  Reports relief with KT kept on for 4 days  Exercises 1x/day  Walking no problem  Sitting is uncomfortable  Sleeping wake up 2x/night        Objective:       Reviewed  sidelying hip ABD  Added clams   Skin clear reapplied KT  MFR to lumbar spine     Treatment Today   3/13/2018    TREATMENT MINUTES COMMENTS   Evaluation     Self-care/ Home management     Manual therapy 10 Prone MFR to lumbar spine/SI   Neuromuscular Re-education     Therapeutic Activity     Therapeutic Exercises 13 Ex per flow sheet/KT   Gait training     Modality__________________                Total 23    Blank areas are intentional and mean the treatment did not include these items.       Cayetano Gonzalez  3/13/2018

## 2021-06-16 NOTE — PROGRESS NOTES
[facility] Daily Progress     Patient Name: Shantelle Taveras  Date: 2018  Visit #:2  PTA visit #:  1  Referral Diagnosis: lumbalgia  Referring provider: Shelli SHAW  Visit Diagnosis:     ICD-10-CM    1. Pain of right sacroiliac joint M53.3    2. Generalized muscle weakness M62.81    3. Bilateral hip pain M25.551     M25.552    4. Acute right-sided low back pain with right-sided sciatica M54.41    5. Decreased ROM of lumbar spine M25.60          Assessment:   Cues for HEP/posture needed  Complaint with HEP  Pt would benefit with continuing skilled PT treatments    Goal Status: Ongoing  Pt. will be independent with home exercise program in : 4 weeks (to self-manage pain and improve function)  Patient will demonstrate proper body mechanics : for lifting;for bending;using 40#;with less pain;for work;in other weeks  other weeks: 8 weeks  Patient will sit: 60 minutes;with less pain;in other weeks;for work (with less than 3/10 pain)  other weeks: 8 weeks  No Data Recorded    Plan / Patient Education:     Continue POC  Progress ex as tolerated    Subjective:     Pain Ratin/10 intermittent c/o of right low back pain   Working   Exercises 1x/day  Walking no problem  Sleeping wake up 2x/night        Objective:     Cues for HEP/posture  Reviewed HEP  Added sidelying hip ABD  KT to low back right SI instructions/precautions discussed HO issued  MFR to lumbar spine     Treatment Today   2018    TREATMENT MINUTES COMMENTS   Evaluation     Self-care/ Home management     Manual therapy 10 Prone MFR to lumbar spine/SI   Neuromuscular Re-education     Therapeutic Activity     Therapeutic Exercises 13 Ex per flow sheet/KT   Gait training     Modality__________________                Total 23    Blank areas are intentional and mean the treatment did not include these items.       Cayetano Gonzalez  2018

## 2021-06-16 NOTE — PROGRESS NOTES
[facility] Daily Progress     Patient Name: Shantelle Taveras  Date: 3/20/2018  Visit #:5/6  PTA visit #:  4  Referral Diagnosis: lumbalgia  Referring provider: Shelli SHAW  Visit Diagnosis:     ICD-10-CM    1. Pain of right sacroiliac joint M53.3    2. Generalized muscle weakness M62.81    3. Bilateral hip pain M25.551     M25.552    4. Acute right-sided low back pain with right-sided sciatica M54.41    5. Decreased ROM of lumbar spine M25.60          Assessment:   Cues for HEP/posture needed  Complaint with HEP  Pt would benefit with continuing skilled PT treatments    Goal Status: Ongoing  Pt. will be independent with home exercise program in : 4 weeks (to self-manage pain and improve function)  Patient will demonstrate proper body mechanics : for lifting;for bending;using 40#;with less pain;for work;in other weeks  other weeks: 8 weeks  Patient will sit: 60 minutes;with less pain;in other weeks;for work (with less than 3/10 pain)  other weeks: 8 weeks  No Data Recorded    Plan / Patient Education:     Continue POC  Progress ex as tolerated  Continue 1x    Subjective:     Pain Rating: 3/10 intermittent c/o of right low back pain  Sore today and c/o abdominal pain  Reports relief with KT and US  Exercises 1x/day  Walking no problem      Objective:     Instruction in self massage  Reported relief with US, KT and ex  Added pisformis stretch and 4ped leg extension   Skin clear reapplied KT   US to right SI per flow sheet, POC       Treatment Today   3/20/2018    TREATMENT MINUTES COMMENTS   Evaluation     Self-care/ Home management     Manual therapy  Prone MFR to lumbar spine/SI   Neuromuscular Re-education     Therapeutic Activity     Therapeutic Exercises 13 Ex per flow sheet/KT   Gait training     Modality______________US____ 10 + 3 set up US 1MHz 50% 1.0wt/cms to right SI lumbar              Total 26    Blank areas are intentional and mean the treatment did not include these items.       Cayetano   Lisa  3/20/2018

## 2021-06-17 NOTE — PROGRESS NOTES
Shantelle Taveras is a 65 y.o. female who is being evaluated via a billable telephone visit.      What phone number would you like to be contacted at? [phone]  How would you like to obtain your AVS? AVS Preference: MyChart.      Subjective   Shantelle Taveras is 65 y.o. and presents today for the following health issues   HPI   Chief Complaint   Patient presents with     Allergies   Have sneezing and congestion  has been using fexofenadine and it does not seem to be working.  She has no fever, headache, purulent nasal discharge, or shortness of breath, or significant cough she has not had the Covid vaccine.  She thinks the change in season.  She has been cutting down on her smoking.  She says she has stopped drinking alcohol.          Review of Systems  She says she is otherwise doing well      Objective    Vitals - Patient Reported  Systolic (Patient Reported): 128  Diastolic (Patient Reported): 79    Physical Exam      Assess/plan  1. Allergic rhinitis due to animals  Recommend adding Flonase.  Gave her some education on allergic rhinitis and preventing nosebleeds and keeping the area moist.  If it continues to happen or she has purulent discharge she is to let me know.  Recommended she stop smoking altogether.  - fluticasone (VERAMYST) 27.5 mcg/actuation nasal spray; 1 spray each nostril twice a day  Dispense: 10 g; Refill: 12            Phone call duration: 9 minutes

## 2021-06-17 NOTE — PROGRESS NOTES
[facility] Discharge Summary  Patient Name: Shantelle Taveras  Date: 3/29/2018  Referral Diagnosis: lumbalgia  Referring provider: Shelli SHAW  Visit Diagnosis:   1. Pain of right sacroiliac joint     2. Generalized muscle weakness     3. Bilateral hip pain     4. Acute right-sided low back pain with right-sided sciatica     5. Decreased ROM of lumbar spine     6. Abnormal mammogram         Goals:   Pt. will be independent with home exercise program in : Met  Patient will demonstrate proper body mechanics : Progressing toward  other weeks: 8 weeks  Patient will sit: 60 minutes;Met  other weeks: 8 weeks  No Data Recorded    Patient was seen for 6 visits from 2/21/18 to 3/27/18 with 0 missed appointments.  The patient met goals and has demonstrated understanding of and independence in the home program for self-care, and progression to next steps.  She will initiate contact if questions or concerns arise.  Patient received a home program .  No further therapy is required at this time.  See above note for complete progress made with PT.    Therapy will be discontinued at this time.  The patient will need a new referral to resume.    Thank you for your referral.  Margo Ramos, PT, DPT  3/29/2018  5:19 PM

## 2021-06-17 NOTE — TELEPHONE ENCOUNTER
Reason for Call:  Other call back      Detailed comments: pt stating current medication (fexofenadine) is really not helping her allergies -   Wondering if she could change meds    Pharm:  [facility]    Phone Number Patient can be reached at:   Cell number on file:    Telephone Information:   Mobile [phone]       Best Time: anytime    Can we leave a detailed message on this number?: Yes    Call taken on 5/13/2021 at 8:38 AM by Ce Pendleton

## 2021-06-20 NOTE — PROGRESS NOTES
Assessment and Plan:   Annual wellness visit.  All materials related to the annual wellness visit were reviewed.  Everything appears stable.  No issues found.    Low back pain.  This is come and gone in the past and seems to have flared up recently.  I discussed this with her and we will refer her to physical therapy.    We will do blood work to include CMP and lipids.  We will also set up a screening colonoscopy.    Left index finger pain.  As the examination is negative today I suspect this may be just a little arthritic flareup.  I advised her to try some heat if needed.        The patient's current medical problems were reviewed.    I have had an Advance Directives discussion with the patient.  The following health maintenance schedule was reviewed with the patient and provided in printed form in the after visit summary:   Health Maintenance   Topic Date Due     ZOSTER VACCINE  06/11/2013     COLONOSCOPY  11/26/2017     DXA SCAN  06/11/2018     PNEUMOCOCCAL POLYSACCHARIDE VACCINE AGE 65 AND OVER  06/11/2018     PNEUMOCOCCAL CONJUGATE VACCINE FOR ADULTS (PCV13 OR PREVNAR)  06/11/2018     INFLUENZA VACCINE RULE BASED (1) 08/01/2018     MAMMOGRAM  07/19/2019     FALL RISK ASSESSMENT  10/04/2019     ADVANCE DIRECTIVES DISCUSSED WITH PATIENT  09/18/2020     TD 18+ HE  11/13/2025     TDAP ADULT ONE TIME DOSE  Completed        Subjective:   Chief Complaint: Shantelle Taveras is an 63 y.o. female here for an Annual Wellness visit.   HPI: This is a 63-year-old lady who comes in primarily for her annual wellness visit.  Her overall health is been fairly stable and she has no ongoing or worsening medical issues.  She has had some intermittent low back pain and is currently in the midst of a minor flareup.  In the past physical therapy has worked for her and we discussed this is an option at this point.  Her only other complaint is of some discomfort in the proximal joint of the left index finger.  She does not recall any  "specific injury or unusual activity that triggered this.  She tells me that it is improving gradually.  Again, no other changes in her medical status since I last saw her.    Review of Systems:    Please see above.  The rest of the review of systems are negative for all systems.    Patient Care Team:  Javier Blankenship MD as PCP - General     Patient Active Problem List   Diagnosis     Chest Pain     Otitis Media     Acute Sinusitis     Cerumen Impaction     Upper Respiratory Infection     Pneumonia     Imaging Studies Nonspecific Abnormal Findings Breast     Mammogram Inconclusive Due To Dense Breasts     Limb Pain     No past medical history on file.   Past Surgical History:   Procedure Laterality Date     BREAST BIOPSY Right 2002?     HYSTERECTOMY       OOPHORECTOMY       DC TOTAL ABDOM HYSTERECTOMY      Description: Total Abdominal Hysterectomy;  Recorded: 01/31/2011;  Comments: 2003      No family history on file.   Social History     Social History     Marital status: Single     Spouse name: N/A     Number of children: N/A     Years of education: N/A     Occupational History     Not on file.     Social History Main Topics     Smoking status: Current Every Day Smoker     Smokeless tobacco: Never Used     Alcohol use Not on file     Drug use: Not on file     Sexual activity: Not on file     Other Topics Concern     Not on file     Social History Narrative      Current Outpatient Prescriptions   Medication Sig Dispense Refill     loratadine (CLARITIN) 10 mg tablet TAKE 1 TABLET (10 MG) BY ORAL ROUTE ONCE DAILY AS NEEDED 10 tablet 0     albuterol (PROVENTIL HFA;VENTOLIN HFA) 90 mcg/actuation inhaler Inhale 1-2 puffs every 4 (four) hours as needed for wheezing. 1 Inhaler 11     tiZANidine (ZANAFLEX) 4 MG tablet Take 1 tablet at qhs prn for muscle spasm. 30 tablet 1     No current facility-administered medications for this visit.       Objective:   Vital Signs:   Visit Vitals     /68     Pulse 75     Ht 5' 2.5\" " (1.588 m)     Wt (!) 98 lb (44.5 kg)     SpO2 97%     BMI 17.64 kg/m2        VisionScreening:  No exam data present     PHYSICAL EXAM  On examination her vital signs are as noted.    Eyes: Pupils are equal and conjunctiva are normal.    Ears nose and throat: External ears canals and tympanic membranes are normal.  Nose and throat are normal.    Neck: Supple with no masses and no neck vein distention.  No thyroid enlargement.    Lungs: Clear.    Cardiovascular: Heart is in a sinus rhythm with a rate of 75 and no ectopy.  No gallops or murmurs.  Carotid pulses are full with no bruits.  No peripheral edema.    GI: Abdomen is soft and nontender with no distention.  No masses or organomegaly.    Musculoskeletal: Head and neck are normal to inspection with good range of motion.  Good strength and range of motion in all 4 extremities.    Neurologic: Cranial nerves are intact.  Gait is normal.    Psychiatric: The patient is alert and oriented x3.  Mood and affect are appropriate    Assessment Results 10/4/2018   Activities of Daily Living No help needed   Instrumental Activities of Daily Living No help needed   Get Up and Go Score Less than 12 seconds   Mini Cog Total Score 5   Some recent data might be hidden     A Mini-Cog score of 0-2 suggests the possibility of dementia, score of 3-5 suggests no dementia    Identified Health Risks:     The patient was counseled and encouraged to consider modifying their diet and eating habits. She was provided with information on recommended healthy diet options.  Patient's advanced directive was discussed and I am comfortable with the patient's wishes.

## 2021-06-20 NOTE — PROGRESS NOTES
[facility] Certification Request    October 10, 2018      Patient: Shantelle Taveras  MR Number: [id number]  YOB: 1955  Date of Visit: 10/10/2018      Dear Dr. Javier Blankenship    Thank you for this referral.   We are seeing Shantelle Taveras for Physical Therapy of low back.    Medicare and/or Medicaid requires physician review and approval of the treatment plan. Please review the plan of care and verify that you agree with the therapy plan of care by co-signing this note.      Plan of Care  Authorization / Certification Start Date: 10/10/18  Authorization / Certification End Date: 12/19/18  Authorization / Certification Number of Visits: 12  Communication with: Referral Source  Patient Related Instruction: Nature of Condition;Treatment plan and rationale;Self Care instruction;Basis of treatment;Body mechanics;Posture;Next steps;Expected outcome  Times per Week: 1-2  Number of Weeks: 10   Number of Visits: up to 12  Discharge Planning: independent with HEP and self-management of symptoms   Precautions / Restrictions : none  Therapeutic Exercise: ROM;Stretching;Strengthening  Neuromuscular Reeducation: kinesio tape;posture;balance/proprioception;core  Manual Therapy: soft tissue mobilization;myofascial release;joint mobilization;muscle energy  Equipment: theraband    Goals:  Pt. will demonstrate/verbalize independence in self-management of condition in : 6 weeks  Pt. will be independent with home exercise program in : 6 weeks  Pt. will report decreased intensity, frequency of : Pain;Comment  Comment:: in 10 weeks <2/10   Pt. will have improved quality of sleep: waking less times/night;in 6 weeks  Patient will sit: 60 minutes;with no pain  Pt will: decrease Oswestery score by >10 points to demonstrate increased functional mobility       If you have any questions or concerns, please don't hesitate to call.    Sincerely,      Francie Lyons, PT        Physician recommendation:     ___ Follow  therapist's recommendation        ___ Modify therapy      *Physician co-signature indicates they certify the need for these services furnished within this plan and while under their care.      [facility]   [facility] Initial Evaluation    Patient Name: Shantelle Taveras  Date of evaluation: 10/10/2018  Referral Diagnosis: Back pain  Referring provider: Javier Blankenship MD  Visit Diagnosis:     ICD-10-CM    1. Acute right-sided low back pain without sciatica M54.5    2. Sprain of sacroiliac region, subsequent encounter S33.6XXD    3. Generalized muscle weakness M62.81      Precautions: none    Assessment:   Pt is a 63 y.o. year old female with R PSIS and SI joint pain.  Patient has difficulty with sitting and transitional movements  secondary to SI joint dysfunction. Pt had positive SI joint findings (thrust test, compression tests and palpation).  Pt has had similar symptoms in the past and Physical Therapy has helped.   Patient appears motivated to participate in Physical Therapy and present with a good Physical Therapy prognosis for resolution of activities limitations.     6/26/17  IMPRESSION:   CONCLUSION:  1.  At L4-L5, there is a moderate disc bulge which may mildly compress the bilateral traversing L5 nerve roots in the lateral recesses.      2.  At L3-L4, there is a small left foraminal/far lateral disc protrusion which contacts the undersurface of the extraforaminal portion of the exiting left L3 nerve root.     3.  At L5-S1, there is mild disc bulge which contacts the bilateral traversing S1 nerve roots and may minimally compress the traversing left S1 nerve root.        Pt. is appropriate for skilled PT intervention as outlined in the Plan of Care (POC).  Pt. is a good candidate for skilled PT services to improve pain levels and function.  Plan of care and goals were established in collaboration with patient.     Goals:  Pt. will demonstrate/verbalize independence in self-management of  condition in : 6 weeks  Pt. will be independent with home exercise program in : 6 weeks  Pt. will report decreased intensity, frequency of : Pain;Comment  Comment:: in 10 weeks <2/10   Pt. will have improved quality of sleep: waking less times/night;in 6 weeks  Patient will sit: 60 minutes;with no pain  Pt will: decrease Oswestery score by >10 points to demonstrate increased functional mobility     Patient goals: low back right side painful (Bulge)    Patient's expectations/goals are realistic.    Barriers to Learning or Achieving Goals:  No Barriers.       Plan / Patient Instructions:        Plan of Care:   Authorization / Certification Start Date: 10/10/18  Authorization / Certification End Date: 12/19/18  Authorization / Certification Number of Visits: 12  Communication with: Referral Source  Patient Related Instruction: Nature of Condition;Treatment plan and rationale;Self Care instruction;Basis of treatment;Body mechanics;Posture;Next steps;Expected outcome  Times per Week: 1-2  Number of Weeks: 10   Number of Visits: up to 12  Discharge Planning: independent with HEP and self-management of symptoms   Precautions / Restrictions : none  Therapeutic Exercise: ROM;Stretching;Strengthening  Neuromuscular Reeducation: kinesio tape;posture;balance/proprioception;core  Manual Therapy: soft tissue mobilization;myofascial release;joint mobilization;muscle energy  Equipment: theraband    Plan for next visit: hip flexor stretch, s/l hip abd, KT tape to lumbar and R SI joint   SKTC, DKTC, ab strengthening, clams, quadruped, piriformis stretch      Subjective:         Social information:   Occupation:PCA/ childcare assistant (bending and running)    Work Status:Working part time   Equipment Available: None    History of Present Illness:    Shantelle is a 63 y.o. female who presents to therapy today with complaints of low back pain on R. Date of onset/duration of symptoms was a couple years ago, pain flares up a couple months  "ago (). Onset was sudden after getting into daughter's pain. Symptoms are intermittent. She reports  an episodic  history of similar symptoms. She describes their previous level of function as limited with sitting    Pain Ratin with sitting  Pain rating at best: 0 with standing  Pain rating at worst: 10  Pain description: aching, painful to the touch  Pain starts around PSIS and radiates into groin     Functional limitations are described as occurring with:   Stand 5 hours - no limitations  Sit 1 hour  Walk 8 hours - no limitations  lifting, bending, sit<>stand, changing sleeping positions     Patient reports benefit from:  ice, walking, exercises          Objective:      Note: Items left blank indicates the item was not performed or not indicated at the time of the evaluation.    Patient Outcome Measures :    Modified Oswestry Low Back Pain Disablity Questionnaire  in %: 32   Scores range from 0-100%, where a score of 0% represents minimal pain and maximal function. The minimal clinically important difference is a score reduction of 12%.    Examination  1. Acute right-sided low back pain without sciatica     2. Sprain of sacroiliac region, subsequent encounter     3. Generalized muscle weakness       Involved side: Right  Posture Observation:      General sitting posture is  fair.    Lumbar ROM:    Date: 10/10/2018     *Indicate scale AROM AROM AROM   Lumbar Flexion WFL \"feels good\"     Lumbar Extension WFL \"feels good\"       Right Left Right Left Right Left   Lumbar Sidebending WFL WFL       Lumbar Rotation         Thoracic Flexion      Thoracic Extension      Thoracic Sidebending         Thoracic Rotation           Lower Extremity Strength:     Date: 10/10/2018     LE strength/5 Right Left Right Left Right Left   Hip Flexion (L1-3) 4 4       Hip Extension (L5-S1) 5 5       Hip Abduction (L4-5) 4 4       Hip Adduction (L2-3)         Hip External Rotation         Hip Internal Rotation         Knee " "Extension (L3-4) 5 5       Knee Flexion 5 5       Ankle Dorsiflexion (L4-5) 5 5       Great Toe Extension (L5) 5 5       Ankle Plantar flexion (S1) 4 3+       Abdominals        Sensation    Not tested        Reflex Testing  Lumbar Dermatomes Right Left UE Reflexes Right Left   Iliac Crest and Groin (L1)   Biceps (C5-6)     Anterior Medial Thigh (L2)   Brachioradialis (C5-6)     Anterior Thigh, Medial Epicondyle Femur (L3)   Triceps (C7-8)     Lateral Thigh, Anterior Knee, Medial Leg/Malleolus (L4)   Jamia s test (SC compression)   Flick middle finger = thumb flexion     Lateral Leg, Dorsal Foot (L5)   LE Reflexes     Lateral Foot (S1)   Patellar (L3-4)     Posterior Leg (S2)   Achilles (S1-2)     Other:   Babinski Response       Palpation:    Lumbar Special Tests:     Lumbar Special Tests Right Left SI Tests Right  Left   Quadrant test   SI Compression- side lying  +    Straight leg raise   SI Distraction - supine \"feels good\"    Distraction    POSH Test - thigh thrust  + +   Slump - tightness in back of leg - Sacral Thrust \"feels good\"     Sit-up test  Gaenslen's test (hip flex and hip ext - press)      Prone instability test  FADIR     Trunk extensor endurance test  Femoral Nerve Tension (prone)       Pubic shotgun  Resisted Abduction in supine       FADER       Supine: R leg longer  Long sit: R leg longer     Repeated Motion Testing:  Does not centralize  Does not peripheralize    Passive Mobility - Joint Integrity: Spring Testing  Not tested      Treatment Today     TREATMENT MINUTES COMMENTS   Evaluation 20 -lumbar spine   Self-care/ Home management     Manual therapy 5 Manual traction on sacrum    Neuromuscular Re-education     Therapeutic Activity     Therapeutic Exercises 25 -see exercise flow sheet  -educated on POC, diagnosis, relevant anatomy, basis for treatment and HEP sheet provided   - trialed SI joint - pt reports decreased pain with movement   - showed pt options online for SI joint for future " purchase if interested.    Gait training     Modality__________________                Total 55    Blank areas are intentional and mean the treatment did not include these items.     PT Evaluation Code: (Please list factors)  Patient History/Comorbidities: see above  Examination: see above  Clinical Presentation: stable  Clinical Decision Making: low    Patient History/  Comorbidities Examination  (body structures and functions, activity limitations, and/or participation restrictions) Clinical Presentation Clinical Decision Making (Complexity)   No documented Comorbidities or personal factors 1-2 Elements Stable and/or uncomplicated Low   1-2 documented comorbidities or personal factor 3 Elements Evolving clinical presentation with changing characteristics Moderate   3-4 documented comorbidities or personal factors 4 or more Unstable and unpredictable High                Francie Lyons, PT, DPT  10/10/2018  1:12 PM

## 2021-06-21 NOTE — PROGRESS NOTES
[facility] Daily Progress     Patient Name: Shantelle Taveras  Date: 10/18/2018  Visit #: 3/12  PTA visit #:    Referral Diagnosis: Back pain   Referring provider: Javier Blankenship MD  Visit Diagnosis:     ICD-10-CM    1. Acute right-sided low back pain without sciatica M54.5    2. Sacroiliac joint pain M53.3    3. Generalized muscle weakness M62.81      Precautions: none    Assessment:   Pt reports increased pain 7/10 in R lower back and hip flexor at the beginning of session.  0/10 at the end of session.     From Eval: Pt is a 63 y.o. year old female with R PSIS and SI joint pain.  Patient has difficulty with sitting and transitional movements  secondary to SI joint dysfunction. Pt had positive SI joint findings (thrust test, compression tests and palpation). Pt has had similar symptoms in the past and Physical Therapy has helped.     Patient demonstrates understanding/independence with home program.  Patient is benefitting from skilled physical therapy and is making steady progress toward functional goals.  Patient is appropriate to continue with skilled physical therapy intervention, as indicated by initial plan of care.    Goal Status:  Pt. will demonstrate/verbalize independence in self-management of condition in : 6 weeks  Pt. will be independent with home exercise program in : 6 weeks  Pt. will report decreased intensity, frequency of : Pain;Comment  Comment:: in 10 weeks <2/10   Pt. will have improved quality of sleep: waking less times/night;in 6 weeks  Patient will sit: 60 minutes;with no pain  Pt will: decrease Oswestery score by >10 points to demonstrate increased functional mobility     Plan / Patient Education:     Continue with initial plan of care.  Progress with home program as tolerated.     Plan for next session: piriformis stretch, LE strengthening, KT, ab strengthening, quadruped, S/L hip abduction     Subjective:     Pain Ratin  Increased pain last night. Pt using ice pack, did  ex's.  Increased pain with sitting.       Objective:   Treatment Today       Exercises:  Exercise #1: isometic hip add hold 5 sec x10   Comment #1: bridge x10 with adduction squeeze and glut let   Exercise #2: LTR x20   Exercise #4: clams 10x bilaterally   Comment #4: SLR x10 added 3# weight  Exercise #5: SKTC, DKTC x30 sec     TREATMENT MINUTES COMMENTS   Evaluation     Self-care/ Home management     Manual therapy 10 K-tape Star-taping R SI joint/PSIS  Prone: STM and myofascial release R SI joint, R gluts    Neuromuscular Re-education     Therapeutic Activity     Therapeutic Exercises 25 See ex's flow sheet   Added MET to quad/hip flexor stretch off table contralateral knee/hip flexed      Gait training     Modality__________________                Total 28    Blank areas are intentional and mean the treatment did not include these items.       Francie Lyons, PT, DPT   10/18/2018

## 2021-06-21 NOTE — PROGRESS NOTES
[facility] Daily Progress     Patient Name: Shantelle Taveras  Date: 10/16/2018  Visit #: 2/12  PTA visit #:    Referral Diagnosis: Back pain   Referring provider: Javier Blankenship MD  Visit Diagnosis:     ICD-10-CM    1. Acute right-sided low back pain without sciatica M54.5    2. Sacroiliac joint pain M53.3    3. Generalized muscle weakness M62.81    4. Pain of right sacroiliac joint M53.3    5. Bilateral hip pain M25.551     M25.552      Precautions: none    Assessment:     From Eval: Pt is a 63 y.o. year old female with R PSIS and SI joint pain.  Patient has difficulty with sitting and transitional movements  secondary to SI joint dysfunction. Pt had positive SI joint findings (thrust test, compression tests and palpation). Pt has had similar symptoms in the past and Physical Therapy has helped.     Patient demonstrates understanding/independence with home program.  Patient is benefitting from skilled physical therapy and is making steady progress toward functional goals.  Patient is appropriate to continue with skilled physical therapy intervention, as indicated by initial plan of care.    Goal Status:  Pt. will demonstrate/verbalize independence in self-management of condition in : 6 weeks  Pt. will be independent with home exercise program in : 6 weeks  Pt. will report decreased intensity, frequency of : Pain;Comment  Comment:: in 10 weeks <2/10   Pt. will have improved quality of sleep: waking less times/night;in 6 weeks  Patient will sit: 60 minutes;with no pain  Pt will: decrease Oswestery score by >10 points to demonstrate increased functional mobility     Plan / Patient Education:     Continue with initial plan of care.  Progress with home program as tolerated.     Plan for next session:     Subjective:     Pain Rating: 3  Increased pain last night.  Pain decreases with ambulation. Pt using ice pack.  Increased pain with sitting.       Objective:   Treatment Today       Exercises:  Exercise  #1: isometic hip add hold 5 sec x10   Comment #1: bridge x10 with adduction squeeze and glut let   Exercise #2: LTR x20   Exercise #4: clams 10x bilaterally   Comment #4: SLR x10 added 3# weight  Exercise #5: SKTC, DKTC x30 sec     TREATMENT MINUTES COMMENTS   Evaluation     Self-care/ Home management     Manual therapy 10 K-tape Star-taping R SI joint  Prone: STM and myofascial release R SI joint, R gluts    Neuromuscular Re-education     Therapeutic Activity     Therapeutic Exercises 25 See ex's flow sheet    Gait training     Modality__________________                Total 35    Blank areas are intentional and mean the treatment did not include these items.       Francie Lyons, PT, DPT   10/16/2018

## 2021-06-21 NOTE — PROGRESS NOTES
[facility] Daily Progress     Patient Name: Shantelle Taveras  Date: 10/29/2018  Visit #:   PTA visit #:  0  Referral Diagnosis: Back pain   Referring provider: Javier Blankenship MD  Visit Diagnosis:     ICD-10-CM    1. Acute right-sided low back pain without sciatica M54.5    2. Generalized muscle weakness M62.81    3. Pain of right sacroiliac joint M53.3      Precautions: none    Assessment:   Discharge today.  Pt reports no pain, stiffness is relieved with HEP.  Recommended pt continue with ex's 2-3x a week.  Reviewed and reprinted recommended HEP.     From Eval: Pt is a 63 y.o. year old female with R PSIS and SI joint pain.  Patient has difficulty with sitting and transitional movements  secondary to SI joint dysfunction. Pt had positive SI joint findings (thrust test, compression tests and palpation). Pt has had similar symptoms in the past and Physical Therapy has helped.     Patient demonstrates understanding/independence with home program.  Patient is benefitting from skilled physical therapy and is making steady progress toward functional goals.  Patient is appropriate to continue with skilled physical therapy intervention, as indicated by initial plan of care.    Goal Status:  Pt. will demonstrate/verbalize independence in self-management of condition in : 6 weeks MET  Pt. will be independent with home exercise program in : 6 weeks Progressing  Pt. will report decreased intensity, frequency of : Pain;Comment MET  Comment:: in 10 weeks <2/10   Pt. will have improved quality of sleep: waking less times/night;in 6 weeks MET  Patient will sit: 60 minutes;with no pain - MET (needs to move after an hour)  Pt will: decrease Oswestery score by >10 points to demonstrate increased functional mobility  -    Plan / Patient Education:     d/c     Plan for next session: d/c     Subjective:   Pt woke up with no pain   Pain Ratin - stiffness   Continuing to improve.      Objective:   Treatment Today:       Exercises:  Exercise #1: isometic hip add hold 5 sec x10   Comment #1: bridge x10 with adduction squeeze and glut - added marching   Exercise #2: LTR x20   Comment #2: s/l hip abduction x10   Exercise #3: pelvic tilts, TA contraction hold for 5 sec, 90/90 hold 10-15 sec x5 - added bicycles   Exercise #4: clams 10x bilaterally   Comment #4: SLR x10 added 3# weight  Exercise #5: SKTC, DKTC x30 sec   Exercise #6: Hip flexor stretch: in supine with leg off table (knee into chest - and with belt) , in lunge       TREATMENT MINUTES COMMENTS   Evaluation     Self-care/ Home management     Manual therapy      Neuromuscular Re-education     Therapeutic Activity     Therapeutic Exercises 30 See ex's flow sheet      Gait training     Modality__________________                Total 30    Blank areas are intentional and mean the treatment did not include these items.       Francie Lyons, PT, DPT   10/29/2018     [facility] Discharge Summary  Patient Name: Shantelle Taveras  Date: 11/28/2018  Referral Diagnosis: back pain   Referring provider: Javier Blankenship MD  Visit Diagnosis:   1. Acute right-sided low back pain without sciatica     2. Generalized muscle weakness     3. Pain of right sacroiliac joint         Goals: MET ALL GOALS  Pt. will demonstrate/verbalize independence in self-management of condition in : 6 weeks  Pt. will be independent with home exercise program in : 6 weeks  Pt. will report decreased intensity, frequency of : Pain;Comment  Comment:: in 10 weeks <2/10   Pt. will have improved quality of sleep: waking less times/night;in 6 weeks  Patient will sit: 60 minutes;with no pain    Pt will: decrease Oswestery score by >10 points to demonstrate increased functional mobility       Patient was seen for 5 visits from 10/10/18 to 10/29/18 with 5 missed appointments.  The patient met goals and has demonstrated understanding of and independence in the home program for self-care, and progression to  next steps.  She will initiate contact if questions or concerns arise.    Therapy will be discontinued at this time.  The patient will need a new referral to resume.    Thank you for your referral.  Francie Lyons, PT, DPT   11/28/2018  1:51 PM

## 2021-06-21 NOTE — PROGRESS NOTES
[facility] Daily Progress     Patient Name: Shantelle Taveras  Date: 10/23/2018  Visit #:   PTA visit #:  0  Referral Diagnosis: Back pain   Referring provider: Javier Blankenship MD  Visit Diagnosis:     ICD-10-CM    1. Acute right-sided low back pain without sciatica M54.5    2. Generalized muscle weakness M62.81    3. Pain of right sacroiliac joint M53.3      Precautions: none    Assessment:     From Eval: Pt is a 63 y.o. year old female with R PSIS and SI joint pain.  Patient has difficulty with sitting and transitional movements  secondary to SI joint dysfunction. Pt had positive SI joint findings (thrust test, compression tests and palpation). Pt has had similar symptoms in the past and Physical Therapy has helped.     Patient demonstrates understanding/independence with home program.  Patient is benefitting from skilled physical therapy and is making steady progress toward functional goals.  Patient is appropriate to continue with skilled physical therapy intervention, as indicated by initial plan of care.    Goal Status:  Pt. will demonstrate/verbalize independence in self-management of condition in : 6 weeks  Pt. will be independent with home exercise program in : 6 weeks  Pt. will report decreased intensity, frequency of : Pain;Comment  Comment:: in 10 weeks <2/10   Pt. will have improved quality of sleep: waking less times/night;in 6 weeks  Patient will sit: 60 minutes;with no pain  Pt will: decrease Oswestery score by >10 points to demonstrate increased functional mobility     Plan / Patient Education:     Continue with initial plan of care.  Progress with home program as tolerated.     Plan for next session:  LE strengthening, KT, ab strengthening, quadruped, plank   1x a week (decreased from 2x a week as long as pt continues to improve)     Subjective:   Pt woke up with no pain   Pain Ratin - stiffness      Objective:   Treatment Today         TREATMENT MINUTES COMMENTS   Evaluation      Self-care/ Home management     Manual therapy 2 K-tape Star-taping R SI joint     Neuromuscular Re-education     Therapeutic Activity     Therapeutic Exercises 26 See ex's flow sheet      Gait training     Modality__________________                Total 28    Blank areas are intentional and mean the treatment did not include these items.       Francie Lyons, PT, DPT   10/23/2018

## 2021-06-21 NOTE — PROGRESS NOTES
Immunizations     Name       Influenza high dose, seasonal     Pneumo Conj 13-V (2010&after)       Patient in clinic today for her pneumo 13 vaccine.  Vaccine given in the right deltoid.  Patient tolerated the vaccine well and had no further questions at this time.    Kendra BROOKS CMA/GEETA....................8:59 AM

## 2021-06-22 NOTE — PROGRESS NOTES
[facility] Follow Up Note    Shantellebret Taveras   63 y.o. female    Date of Visit: 1/9/2019    Chief Complaint   Patient presents with     Shoulder Pain     left, started sunday with wrist then elbow now shoulder     Subjective  This is a 63-year-old lady who comes in today primarily because of some left shoulder pain.  Over the past week she has developed pain in the left arm.  It started in her hand and wrist which then gradually improved and her elbow was bothering her which is also improved and now it is mostly in the anterior left shoulder.  She feels it is a little swollen.  She is left-handed.  She had been working doing some cleaning prior to the onset of this discomfort.  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  She thinks that shoulder is slightly improved with the application of cold packs.    While here she is noted to have some elevated blood pressure.  No headaches or dizziness and no chest pain or shortness of breath.  No history of elevated blood pressure.    ROS A comprehensive review of systems was performed and was otherwise negative    Medications, allergies, and problem list were reviewed and updated    Exam  General Appearance:   On examination her blood pressure is 162/80.  Weight is 105 pounds and height is 62.5 inches.  BMI is 18.90.    Heart is in a sinus rhythm with a rate of 78 and no ectopy.    The left shoulder shows minimal swelling anteriorly.  There is no particular tenderness.  Range of motion in that shoulder is good.  Strength is also good.  The rest of the arm and hand are normal.    The patient is alert and oriented x3.      Assessment/Plan  1. Acute pain of left shoulder     2. Elevated blood pressure reading without diagnosis of hypertension     3. Seasonal allergies  loratadine (CLARITIN) 10 mg tablet   4. Lumbar radiculitis  tiZANidine (ZANAFLEX) 4 MG tablet   5. Myofascial pain  tiZANidine  (ZANAFLEX) 4 MG tablet   6. Sleep disturbance  tiZANidine (ZANAFLEX) 4 MG tablet     Probable left shoulder strain.  I suspect this is activity related.  She is trying to be more careful with that.  I suggested she take a couple of days off of work and also that she continue to apply cold packs.  We will follow-up if this is not improving.    Elevated blood pressure.  I am not sure why the blood pressure is elevated today I advised her to return in 2 weeks to allow me to recheck it and make sure that this was just an anomaly.      Javier Blankenship MD      Current Outpatient Medications on File Prior to Visit   Medication Sig     albuterol (PROVENTIL HFA;VENTOLIN HFA) 90 mcg/actuation inhaler Inhale 1-2 puffs every 4 (four) hours as needed for wheezing.     [DISCONTINUED] loratadine (CLARITIN) 10 mg tablet TAKE 1 TABLET (10 MG) BY ORAL ROUTE ONCE DAILY AS NEEDED     [DISCONTINUED] tiZANidine (ZANAFLEX) 4 MG tablet Take 1 tablet at qhs prn for muscle spasm.     No current facility-administered medications on file prior to visit.      Allergies   Allergen Reactions     Codeine      Social History     Tobacco Use     Smoking status: Current Every Day Smoker     Smokeless tobacco: Never Used   Substance Use Topics     Alcohol use: Not on file     Drug use: Not on file

## 2021-06-23 NOTE — PROGRESS NOTES
[facility] Follow Up Note    Shantelle Taveras   63 y.o. female    Date of Visit: 1/23/2019    Chief Complaint   Patient presents with     Follow-up     Subjective  This is a 63-year-old lady who is in a couple of weeks ago with some shoulder issues.  My suspicion at the time is that it was musculoskeletal strain.  We advised her to take a few days off from work and to rest it and see what happened.  She comes in today and tells me the shoulder is much better.  She is gradually regaining strength and motion in the shoulder.  At the time of that visit her blood pressure was slightly elevated and I asked her to come back to recheck that today.  She thinks it may have been due to some stresses going on the time.  No headaches or dizziness and no chest pain or shortness of breath.    ROS A comprehensive review of systems was performed and was otherwise negative    Medications, allergies, and problem list were reviewed and updated    Exam  General Appearance:   On examination today her blood pressure is 136/78.  Weight is 105 pounds and height is 62.5 inches.  BMI is 18.90.    Heart is in a sinus rhythm with a rate of 90 and no ectopy.    The patient is alert and oriented x3.    Shoulder shows improved motion and no tenderness.      Assessment/Plan  1. Elevated blood pressure reading without diagnosis of hypertension     2. Left shoulder pain, unspecified chronicity       Left shoulder pain.  Improving.  Probably muscle pain.    Elevated blood pressure.  Better today.  I suspect patient was right about stress-induced blood pressure.  No treatment for now.  Recheck her blood pressure in about 3-4 months.      Javier Blankenship MD      Current Outpatient Medications on File Prior to Visit   Medication Sig     albuterol (PROVENTIL HFA;VENTOLIN HFA) 90 mcg/actuation inhaler Inhale 1-2 puffs every 4 (four) hours as needed for wheezing.     ALLER-EASE 180 mg tablet TAKE ONE TABLET BY MOUTH ONE TIME DAILY       loratadine (CLARITIN) 10 mg tablet TAKE 1 TABLET (10 MG) BY ORAL ROUTE ONCE DAILY AS NEEDED     tiZANidine (ZANAFLEX) 4 MG tablet Take 1 tablet at qhs prn for muscle spasm.     No current facility-administered medications on file prior to visit.      Allergies   Allergen Reactions     Codeine      Social History     Tobacco Use     Smoking status: Current Every Day Smoker     Smokeless tobacco: Never Used   Substance Use Topics     Alcohol use: Not on file     Drug use: Not on file

## 2021-06-23 NOTE — TELEPHONE ENCOUNTER
RN cannot approve Refill Request    RN can NOT refill this medication medication not on med list. Last office visit: 1/9/2019 Javier Blankenship MD Last Physical: 10/4/2018 Last MTM visit: Visit date not found Last visit same specialty: 1/9/2019 Javier Blankenship MD.  Next visit within 3 mo: Visit date not found  Next physical within 3 mo: Visit date not found      Yoshi Ring, [facility] Triage/Med Refill 1/13/2019    Requested Prescriptions   Pending Prescriptions Disp Refills     ALLER-EASE 180 mg tablet [Pharmacy Med Name: EQL Aller-ease Oral Tablet 180 MG] 30 tablet 1     Sig: TAKE ONE TABLET BY MOUTH ONE TIME DAILY    Antihistamine Refill Protocol Passed - 1/12/2019  9:33 AM       Passed - Patient has had office visit/physical in last year    Last office visit with prescriber/PCP: 1/9/2019 Javier Blankenship MD OR same dept: 1/9/2019 Javier Blankenship MD OR same specialty: 1/9/2019 Javier Blankenship MD  Last physical: 10/4/2018 Last MTM visit: Visit date not found   Next visit within 3 mo: Visit date not found  Next physical within 3 mo: Visit date not found  Prescriber OR PCP: Javier Blankenship MD  Last diagnosis associated with med order: There are no diagnoses linked to this encounter.  If protocol passes may refill for 12 months if within 3 months of last provider visit (or a total of 15 months).

## 2021-06-26 ENCOUNTER — HEALTH MAINTENANCE LETTER (OUTPATIENT)
Age: 66
End: 2021-06-26

## 2021-07-20 ENCOUNTER — TELEPHONE (OUTPATIENT)
Dept: INTERNAL MEDICINE | Facility: CLINIC | Age: 66
End: 2021-07-20

## 2021-07-20 NOTE — TELEPHONE ENCOUNTER
Reason for Call:  Other call back    Detailed comments: Pt is calling asking if there is an over the counter medication she can take for her lt hand 1st finger - having achiness, pain     Please advise    Phone Number Patient can be reached at: Cell number on file:    Telephone Information:   Mobile [phone]       Best Time: anytime    Can we leave a detailed message on this number? YES    Call taken on 7/20/2021 at 1:19 PM by Ce Pendleton

## 2021-07-21 ENCOUNTER — RECORDS - HEALTHEAST (OUTPATIENT)
Dept: ADMINISTRATIVE | Facility: CLINIC | Age: 66
End: 2021-07-21

## 2021-07-21 NOTE — TELEPHONE ENCOUNTER
She can take OTC ibuprfent 400mg twice daily as needed or tyelenol 500mg three times a day . Both can be combined if needed

## 2021-07-21 NOTE — TELEPHONE ENCOUNTER
Pt informed and states that she has been doing IBU and would like to know if pcp thinks any ointments or creams could help

## 2021-07-23 ENCOUNTER — RECORDS - HEALTHEAST (OUTPATIENT)
Dept: INTERNAL MEDICINE | Facility: CLINIC | Age: 66
End: 2021-07-23

## 2021-07-23 DIAGNOSIS — R92.8 OTHER ABNORMAL FINDINGS ON RADIOLOGICAL EXAMINATION OF BREAST: ICD-10-CM

## 2021-09-01 ENCOUNTER — OFFICE VISIT (OUTPATIENT)
Dept: INTERNAL MEDICINE | Facility: CLINIC | Age: 66
End: 2021-09-01
Payer: COMMERCIAL

## 2021-09-01 ENCOUNTER — ANCILLARY PROCEDURE (OUTPATIENT)
Dept: GENERAL RADIOLOGY | Facility: CLINIC | Age: 66
End: 2021-09-01
Attending: INTERNAL MEDICINE
Payer: COMMERCIAL

## 2021-09-01 VITALS
OXYGEN SATURATION: 97 % | BODY MASS INDEX: 17.94 KG/M2 | DIASTOLIC BLOOD PRESSURE: 80 MMHG | SYSTOLIC BLOOD PRESSURE: 132 MMHG | HEART RATE: 102 BPM | WEIGHT: 98.9 LBS

## 2021-09-01 DIAGNOSIS — M79.645 PAIN OF FINGER OF LEFT HAND: Primary | ICD-10-CM

## 2021-09-01 DIAGNOSIS — M79.645 PAIN OF FINGER OF LEFT HAND: ICD-10-CM

## 2021-09-01 PROCEDURE — 99213 OFFICE O/P EST LOW 20 MIN: CPT | Performed by: INTERNAL MEDICINE

## 2021-09-01 PROCEDURE — 73140 X-RAY EXAM OF FINGER(S): CPT | Mod: TC | Performed by: RADIOLOGY

## 2021-09-01 NOTE — PROGRESS NOTES
[facility]    Assessment/Plan:    1. Pain of finger of left hand  This is persistent.  She reports swelling in the morning but currently it is not swollen.  She denies any catching in the finger.  Tylenol makes pain better but it still persists.  She denies pain in any any other fingers or numbness in her hand.    Discussed using Voltaren gel topically to help with his discomfort.  We will do an x-ray today.  If she is not improving in the next 2 weeks she can see an orthopedist.  - XR Finger Left G/E 2 Views; Future  - diclofenac (VOLTAREN) 1 % topical gel; Apply 1 g topically 4 times daily  Dispense: 100 g; Refill: 3  - Orthopedic  Referral; Future      Patient Instructions   1. Will check XR of finger to check for arthritis    2. Try topical Voltaren gel 4 times a day.    3. If not better, will send to ortho.       Rufina Hensley MD, MD    Chief Complaint:    Chief Complaint   Patient presents with     Finger     Pain for about 1 month       History of Present Illness:  Shantelle is a 66 year old female with history of osteoporosis, smoking and seasonal allergies who is currently here for acute visit due to persistent pain in her left index finger.    Symptoms started over a month ago.  She has been using some Advil and Tylenol which temporarily helps to relieve the pain but currently it still comes back and often she feels shooting pain in her finger.  In the morning she feels that the finger is swallowing it is hard for her to banded.  She denies any injury.  On exam today there is full range of motion and no swelling but she does report pain when she tries to .    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  Smokes ten cigs a day   Hard liquor every other day   Has one son .        Past History: Reviewed  Current Outpatient Medications   Medication Sig Dispense Refill     diclofenac (VOLTAREN) 1 % topical gel Apply 1 g topically 4 times daily 100 g 3       Family History: Reviewed    Physical Exam:    Vitals:    09/01/21 1141   BP: 132/80   BP Location: Left arm   Patient Position: Sitting   Cuff Size: Adult Regular   Pulse: 102   SpO2: 97%   Weight: 44.9 kg (98 lb 14.4 oz)     Wt Readings from Last 3 Encounters:   09/01/21 44.9 kg (98 lb 14.4 oz)   02/25/21 49.3 kg (108 lb 9.6 oz)   11/20/20 47.2 kg (104 lb)     Body mass index is 17.94 kg/m .    Constitutional:  Reveals a pleasant female.  Vitals:  Per nursing notes.  HEENT: conjunctiva is pink,   Rheumatologic: Normal joints and nails of the hands.  Denies synovitis or swelling noted.  Left index finger she is able to move and flex without swelling.  She does have mild tenderness to palpation at the base and at the interphalangeal joint but no deformities or swelling appreciated.    Psychiatric: affect appropriate, memory intact.     Data Review:    Analysis and Summary of Old Records (2): yes      Records Requested (1): no      Other History Summarized (from other people in the room) (2): no    Radiology Tests Summarized (XRAY/CT/MRI/DXA) (1): no    Labs Reviewed (1): yes    Medicine Tests Reviewed (EKG/ECHO/COLONOSCOPY/EGD) (1): no    Independent Review of EKG or X-RAY (2): no

## 2021-09-01 NOTE — PATIENT INSTRUCTIONS
1. Will check XR of finger to check for arthritis    2. Try topical Voltaren gel 4 times a day.    3. If not better, will send to ortho.

## 2021-09-27 ENCOUNTER — MYC MEDICAL ADVICE (OUTPATIENT)
Dept: INTERNAL MEDICINE | Facility: CLINIC | Age: 66
End: 2021-09-27

## 2021-09-27 DIAGNOSIS — Z20.822 ENCOUNTER FOR LABORATORY TESTING FOR COVID-19 VIRUS: Primary | ICD-10-CM

## 2021-10-01 ENCOUNTER — TELEPHONE (OUTPATIENT)
Dept: INTERNAL MEDICINE | Facility: CLINIC | Age: 66
End: 2021-10-01

## 2021-10-01 DIAGNOSIS — F41.9 ANXIETY: Primary | ICD-10-CM

## 2021-10-01 RX ORDER — LORAZEPAM 0.5 MG/1
0.5 TABLET ORAL EVERY 6 HOURS PRN
Qty: 10 TABLET | Refills: 1 | Status: SHIPPED | OUTPATIENT
Start: 2021-10-01 | End: 2022-07-28

## 2021-10-01 NOTE — TELEPHONE ENCOUNTER
Reason for Call:  Other prescription    Detailed comments: pt is going to be traveling starting 11/2/21 to [place] and would like something for flying for anxiety.  She has taken something in the past, but cannot remember what it was.    Phone Number Patient can be reached at: Home number on file [phone] (home)    Best Time: anytime    Can we leave a detailed message on this number? YES    Call taken on 10/1/2021 at 10:20 AM by Rui Jo

## 2021-10-16 ENCOUNTER — HEALTH MAINTENANCE LETTER (OUTPATIENT)
Age: 66
End: 2021-10-16

## 2021-10-25 ENCOUNTER — LAB (OUTPATIENT)
Dept: LAB | Facility: CLINIC | Age: 66
End: 2021-10-25
Attending: INTERNAL MEDICINE
Payer: COMMERCIAL

## 2021-10-25 DIAGNOSIS — Z20.822 ENCOUNTER FOR LABORATORY TESTING FOR COVID-19 VIRUS: ICD-10-CM

## 2021-10-25 PROCEDURE — 90662 IIV NO PRSV INCREASED AG IM: CPT

## 2021-10-25 PROCEDURE — U0005 INFEC AGEN DETEC AMPLI PROBE: HCPCS

## 2021-10-25 PROCEDURE — U0003 INFECTIOUS AGENT DETECTION BY NUCLEIC ACID (DNA OR RNA); SEVERE ACUTE RESPIRATORY SYNDROME CORONAVIRUS 2 (SARS-COV-2) (CORONAVIRUS DISEASE [COVID-19]), AMPLIFIED PROBE TECHNIQUE, MAKING USE OF HIGH THROUGHPUT TECHNOLOGIES AS DESCRIBED BY CMS-2020-01-R: HCPCS

## 2021-10-25 PROCEDURE — G0008 ADMIN INFLUENZA VIRUS VAC: HCPCS

## 2021-10-26 LAB — SARS-COV-2 RNA RESP QL NAA+PROBE: NEGATIVE

## 2021-10-29 ENCOUNTER — IMMUNIZATION (OUTPATIENT)
Dept: NURSING | Facility: CLINIC | Age: 66
End: 2021-10-29
Payer: COMMERCIAL

## 2021-10-29 PROCEDURE — 0004A PR COVID VAC PFIZER DIL RECON 30 MCG/0.3 ML IM: CPT

## 2021-10-29 PROCEDURE — 91300 PR COVID VAC PFIZER DIL RECON 30 MCG/0.3 ML IM: CPT

## 2022-01-19 ENCOUNTER — OFFICE VISIT (OUTPATIENT)
Dept: INTERNAL MEDICINE | Facility: CLINIC | Age: 67
End: 2022-01-19
Payer: COMMERCIAL

## 2022-01-19 VITALS
BODY MASS INDEX: 20.02 KG/M2 | HEIGHT: 62 IN | OXYGEN SATURATION: 98 % | DIASTOLIC BLOOD PRESSURE: 86 MMHG | SYSTOLIC BLOOD PRESSURE: 146 MMHG | WEIGHT: 108.8 LBS | HEART RATE: 90 BPM

## 2022-01-19 DIAGNOSIS — E78.2 MIXED HYPERLIPIDEMIA: ICD-10-CM

## 2022-01-19 DIAGNOSIS — Z87.891 PERSONAL HISTORY OF TOBACCO USE, PRESENTING HAZARDS TO HEALTH: ICD-10-CM

## 2022-01-19 DIAGNOSIS — R73.03 PREDIABETES: ICD-10-CM

## 2022-01-19 DIAGNOSIS — B18.2 CHRONIC HEPATITIS C WITHOUT HEPATIC COMA (H): Primary | ICD-10-CM

## 2022-01-19 DIAGNOSIS — Z12.31 ENCOUNTER FOR SCREENING MAMMOGRAM FOR BREAST CANCER: ICD-10-CM

## 2022-01-19 LAB
ALBUMIN SERPL-MCNC: 3.9 G/DL (ref 3.5–5)
ALP SERPL-CCNC: 69 U/L (ref 45–120)
ALT SERPL W P-5'-P-CCNC: 22 U/L (ref 0–45)
ANION GAP SERPL CALCULATED.3IONS-SCNC: 9 MMOL/L (ref 5–18)
AST SERPL W P-5'-P-CCNC: 31 U/L (ref 0–40)
BILIRUB SERPL-MCNC: 0.5 MG/DL (ref 0–1)
BUN SERPL-MCNC: 11 MG/DL (ref 8–22)
CALCIUM SERPL-MCNC: 9.7 MG/DL (ref 8.5–10.5)
CHLORIDE BLD-SCNC: 103 MMOL/L (ref 98–107)
CHOLEST SERPL-MCNC: 193 MG/DL
CO2 SERPL-SCNC: 27 MMOL/L (ref 22–31)
CREAT SERPL-MCNC: 0.83 MG/DL (ref 0.6–1.1)
FASTING STATUS PATIENT QL REPORTED: NO
GFR SERPL CREATININE-BSD FRML MDRD: 76 ML/MIN/1.73M2
GLUCOSE BLD-MCNC: 85 MG/DL (ref 70–125)
HBA1C MFR BLD: 5.4 % (ref 0–5.6)
HDLC SERPL-MCNC: 91 MG/DL
LDLC SERPL CALC-MCNC: 93 MG/DL
POTASSIUM BLD-SCNC: 3.9 MMOL/L (ref 3.5–5)
PROT SERPL-MCNC: 7.7 G/DL (ref 6–8)
SODIUM SERPL-SCNC: 139 MMOL/L (ref 136–145)
TRIGL SERPL-MCNC: 47 MG/DL
TSH SERPL DL<=0.005 MIU/L-ACNC: 0.37 UIU/ML (ref 0.3–5)

## 2022-01-19 PROCEDURE — 87902 NFCT AGT GNTYP ALYS HEP C: CPT | Mod: 90 | Performed by: INTERNAL MEDICINE

## 2022-01-19 PROCEDURE — 99214 OFFICE O/P EST MOD 30 MIN: CPT | Performed by: INTERNAL MEDICINE

## 2022-01-19 PROCEDURE — 80053 COMPREHEN METABOLIC PANEL: CPT | Performed by: INTERNAL MEDICINE

## 2022-01-19 PROCEDURE — 87522 HEPATITIS C REVRS TRNSCRPJ: CPT | Performed by: INTERNAL MEDICINE

## 2022-01-19 PROCEDURE — 83036 HEMOGLOBIN GLYCOSYLATED A1C: CPT | Performed by: INTERNAL MEDICINE

## 2022-01-19 PROCEDURE — 99000 SPECIMEN HANDLING OFFICE-LAB: CPT | Performed by: INTERNAL MEDICINE

## 2022-01-19 PROCEDURE — 36415 COLL VENOUS BLD VENIPUNCTURE: CPT | Performed by: INTERNAL MEDICINE

## 2022-01-19 PROCEDURE — 84443 ASSAY THYROID STIM HORMONE: CPT | Performed by: INTERNAL MEDICINE

## 2022-01-19 PROCEDURE — 80061 LIPID PANEL: CPT | Performed by: INTERNAL MEDICINE

## 2022-01-19 ASSESSMENT — MIFFLIN-ST. JEOR: SCORE: 980.73

## 2022-01-19 NOTE — PROGRESS NOTES
Assessment & Plan     Chronic hepatitis C without hepatic coma (H)  Not clear if patient went to hepatologist. At any rate if she continues to drink alcohol it may not be of any point in seeing a GI specialist. She is willing to get hep C RNA done it again. I did tell her hepatitis C is treatable but and can cause cirrhosis. We will check for her liver tests today she says that even if she did stop alcohol. She would still not be able to remember to take the oral antivirals. She is also worried about insurance coverage I did tell her injections are an option that can be supervised. But not treating should not be a safe option for her.  - Comprehensive metabolic panel  - Hepatitis C High Resolution Genotype  - Hepatitis C RNA, quantitative  - Comprehensive metabolic panel  - Hepatitis C High Resolution Genotype  - Hepatitis C RNA, quantitative    Mixed hyperlipidemia    - Lipid panel reflex to direct LDL Fasting  - TSH  - Lipid panel reflex to direct LDL Fasting  - TSH    Prediabetes    - Hemoglobin A1c  - Hemoglobin A1c    Encounter for screening mammogram for breast cancer  I did urged her to continue her screening tests.  - *MA Screening Digital Bilateral    Personal history of tobacco use, presenting hazards to health  She has a 25-pack-year history of smoking so does not qualify for lung cancer screening I did tell her to cut back on smoking    Blood pressure is elevated in the past it has been low. I doubt that she will be compliant with medications. Recommend follow-up blood pressure and up in a couple of months.           Tobacco Cessation:   reports that she has been smoking. She has never used smokeless tobacco.          Return in about 3 months (around 4/19/2022).    Brigida Godinez MD  [facility]    Lindsay Pepper is a 66 year old who presents for the following health issues for breast exam because she has noticed some itching and [ain for the last 3 months     HPI  "    Patient Active Problem List   Diagnosis     Chest Pain     Otitis Media     Acute Sinusitis     Cerumen Impaction     Upper Respiratory Infection     Pneumonia     Imaging Studies Nonspecific Abnormal Findings Breast     Mammogram Inconclusive Due To Dense Breasts     Limb Pain     History of colonic polyps     Hepatitis C, chronic (H)     Personal history of tobacco use, presenting hazards to health     Current Outpatient Medications   Medication     diclofenac (VOLTAREN) 1 % topical gel     LORazepam (ATIVAN) 0.5 MG tablet     No current facility-administered medications for this visit.           Review of Systems   Constitutional, HEENT, cardiovascular, pulmonary, gi and gu systems are negative, except as otherwise noted.      Objective    BP (!) 146/86 (BP Location: Left arm, Patient Position: Sitting, Cuff Size: Adult Regular)   Pulse 90   Ht 1.581 m (5' 2.25\")   Wt 49.4 kg (108 lb 12.8 oz)   SpO2 98%   BMI 19.74 kg/m    Body mass index is 19.74 kg/m .  Physical Exam   GENERAL: healthy, alert and no distress  NECK: no adenopathy, no asymmetry, masses, or scars and thyroid normal to palpation  RESP: lungs clear to auscultation - no rales, rhonchi or wheezes  CV: regular rate and rhythm, normal S1 S2, no S3 or S4, no murmur, click or rub, no peripheral edema and peripheral pulses strong  ABDOMEN: soft, nontender, no hepatosplenomegaly, no masses and bowel sounds normal  MS: no gross musculoskeletal defects noted, no edema    Breast exam NAD no rash no nipple changes         "

## 2022-01-19 NOTE — LETTER
January 31, 2022      Shantelle Taveras  [address] [place] [address]        Dear ,    We are writing to inform you of your test results.    The hepatitis C virus levels are very high . The liver tests however are normal .  As mentioned , you are eligible for treatment . You do have to see the liver specialists at [facility] . Let me know     Resulted Orders   Comprehensive metabolic panel   Result Value Ref Range    Sodium 139 136 - 145 mmol/L    Potassium 3.9 3.5 - 5.0 mmol/L    Chloride 103 98 - 107 mmol/L    Carbon Dioxide (CO2) 27 22 - 31 mmol/L    Anion Gap 9 5 - 18 mmol/L    Urea Nitrogen 11 8 - 22 mg/dL    Creatinine 0.83 0.60 - 1.10 mg/dL    Calcium 9.7 8.5 - 10.5 mg/dL    Glucose 85 70 - 125 mg/dL    Alkaline Phosphatase 69 45 - 120 U/L    AST 31 0 - 40 U/L    ALT 22 0 - 45 U/L    Protein Total 7.7 6.0 - 8.0 g/dL    Albumin 3.9 3.5 - 5.0 g/dL    Bilirubin Total 0.5 0.0 - 1.0 mg/dL    GFR Estimate 76 >60 mL/min/1.73m2      Comment:      Effective December 21, 2021 eGFRcr in adults is calculated using the 2021 CKD-EPI creatinine equation which includes age and gender (Kristen et al., NEJM, DOI: 10.1056/[id number])   Hepatitis C High Resolution Genotype   Result Value Ref Range    Hepatitis C High Resolution 3a       Comment:      INTERPRETIVE INFORMATION: Hepatitis C High Resolution   Genotype     Hepatitis C viral RNA is assayed using reverse   transcription polymerase chain reaction (RT-PCR) to amplify   specific portions of both the Core and NS5B regions of the   viral genome. The amplified nucleic acid is sequenced   bi-directionally using dye-terminator chemistry ([employer]).   Sequencing data is compared to a database of characterized   sequences.     Isolates of hepatitis C virus are grouped into six major   genotypes(1-6). These genotypes are subtyped according to   sequence characteristics. Sequencing both the Core and NS5B   regions allows for subtyping of all confirmed and most    provisional genotypes, including differentiation of 1a from   1b and typing of genotype 6.    This test was developed and its performance characteristics   determined by [facility]. It has not been cleared or   approved by the US Food and Drug Administration. This test   was performed in a CLIA certified laboratory and is    intended for clinical purposes.  Performed By: [facility]  [address] [place] [address]  : Elsie Yarbrough MD   Hepatitis C RNA, quantitative   Result Value Ref Range    Hepatitis C RNA IU/mL, Instrument 2,102,408 (H) <1 IU/mL    Hepatitis C log 6.3     Narrative    The NIKHIL AmpliPrep/NIKHIL TaqMan HCV test is a FDA-approved in vitro nucleic acid amplification test for the quantitation of HCV DNA in human plasma (EDTA plasma) or serum using the NIKHIL AmpliPrep instrument for automated viral nucleic acid extraction and the NIKHIL TaqMan for the automated real-time PCR amplification and detection of viral nucleic acid target. Titer results are reported in International Units/mL (IU/mL) using the 1st WHO International standard for HBV for nucleic acid amplification assays.   Lipid panel reflex to direct LDL Fasting   Result Value Ref Range    Cholesterol 193 <=199 mg/dL    Triglycerides 47 <=149 mg/dL    Direct Measure HDL 91 >=50 mg/dL      Comment:      HDL Cholesterol Reference Range:     0-2 years:   No reference ranges established for patients under 2 years old  at [facility] for lipid analytes.    2-8 years:  Greater than 45 mg/dL     18 years and older:   Female: Greater than or equal to 50 mg/dL   Male:   Greater than or equal to 40 mg/dL    LDL Cholesterol Calculated 93 <=129 mg/dL    Patient Fasting > 8hrs? No    Hemoglobin A1c   Result Value Ref Range    Hemoglobin A1C 5.4 0.0 - 5.6 %      Comment:      Normal <5.7%   Prediabetes 5.7-6.4%    Diabetes 6.5% or higher     Note: Adopted from ADA consensus guidelines.   TSH   Result  Value Ref Range    TSH 0.37 0.30 - 5.00 uIU/mL       If you have any questions or concerns, please call the clinic at the number listed above.       Sincerely,      Brigida Godinez MD

## 2022-01-20 LAB
HCV RNA SERPL NAA+PROBE-ACNC: ABNORMAL IU/ML
HCV RNA SERPL NAA+PROBE-LOG IU: 6.3 {LOG_IU}/ML

## 2022-01-22 PROBLEM — Z87.891 PERSONAL HISTORY OF TOBACCO USE, PRESENTING HAZARDS TO HEALTH: Status: ACTIVE | Noted: 2022-01-22

## 2022-01-28 LAB — HCV GENTYP SERPL NAA+PROBE: NORMAL

## 2022-02-02 ENCOUNTER — ANCILLARY PROCEDURE (OUTPATIENT)
Dept: MAMMOGRAPHY | Facility: CLINIC | Age: 67
End: 2022-02-02
Attending: INTERNAL MEDICINE
Payer: COMMERCIAL

## 2022-02-02 DIAGNOSIS — Z12.31 VISIT FOR SCREENING MAMMOGRAM: ICD-10-CM

## 2022-02-02 PROCEDURE — 77067 SCR MAMMO BI INCL CAD: CPT | Mod: TC | Performed by: RADIOLOGY

## 2022-02-02 PROCEDURE — 77063 BREAST TOMOSYNTHESIS BI: CPT | Mod: TC | Performed by: RADIOLOGY

## 2022-02-05 ENCOUNTER — HEALTH MAINTENANCE LETTER (OUTPATIENT)
Age: 67
End: 2022-02-05

## 2022-04-17 DIAGNOSIS — J01.90 ACUTE SINUSITIS, RECURRENCE NOT SPECIFIED, UNSPECIFIED LOCATION: Primary | ICD-10-CM

## 2022-04-19 RX ORDER — FEXOFENADINE HCL 180 MG/1
TABLET ORAL
Qty: 90 TABLET | Refills: 3 | Status: SHIPPED | OUTPATIENT
Start: 2022-04-19 | End: 2023-06-08

## 2022-04-19 NOTE — TELEPHONE ENCOUNTER
"Outpatient Medication Detail     Disp Refills Start End DRAKE   fexofenadine (ALLER-EASE) 180 MG tablet 30 tablet 1 5/12/2021  No   Sig - Route: Take 1 tablet (180 mg total) by mouth daily. - Oral   Sent to pharmacy as: fexofenadine 180 mg tablet (Aller-ease)   E-Prescribing Status: Receipt confirmed by pharmacy (5/12/2021  4:33 PM CDT)       fexofenadine (ALLER-EASE) 180 MG tablet [[id number]]    Electronically signed by: Brigida Godinez MD on 05/12/21 1633 Status: Active   Ordering user: Brigida Godinez MD 05/12/21 1633 Authorized by: Brigida Godinez MD   Frequency: DAILY 05/12/21 - Until Discontinued Released by: Brigida Godinez MD 05/12/21 1633   Diagnoses  Acute sinusitis, recurrence not specified, unspecified location [J01.90]     Routing refill request to provider for review/approval because:  Age warning    Last office visit provider:  1/19/22     Requested Prescriptions   Pending Prescriptions Disp Refills     fexofenadine (ALLEGRA) 180 MG tablet [Pharmacy Med Name: Fexofenadine HCl Oral Tablet 180 MG] 30 tablet 0     Sig: TAKE ONE TABLET BY MOUTH ONE TIME DAILY       Antihistamines Protocol Failed - 4/19/2022  9:00 AM        Failed - Patient is 3-64 years of age     Apply weight-based dosing for peds patients age 3 - 12 years of age.    Forward request to provider for patients under the age of 3 or over the age of 64.          Failed - Medication is active on med list        Passed - Recent (12 mo) or future (30 days) visit within the authorizing provider's specialty     Patient has had an office visit with the authorizing provider or a provider within the authorizing providers department within the previous 12 mos or has a future within next 30 days. See \"Patient Info\" tab in inbasket, or \"Choose Columns\" in Meds & Orders section of the refill encounter.                   Molina Li RN 04/19/22 9:01 AM  "

## 2022-07-13 ENCOUNTER — PATIENT OUTREACH (OUTPATIENT)
Dept: GERIATRIC MEDICINE | Facility: CLINIC | Age: 67
End: 2022-07-13

## 2022-07-13 NOTE — LETTER
July 13, 2022    Important [employer] Information    SHANTELLE HAMMOND  [address] [place] [address]  A Partner in Your Care  Dear Shantelle,  Thank you for choosing [employer] for your health plan coverage! I am excited to welcome you as a member of [facility] [employer] .  My name is Yudi Plascencia RN, PHN  and I will be working with you as your Care Coordinator. [employer] partners with [employer] to provide members with Care Coordination services.  As your Care Coordinator, I can:    Work with you to create a Care Plan to keep you healthy and safe    Help you make appointments to see health care providers     Support you and your family in making health care decisions    Find community services that may interest you    Identify health benefits you are eligible for  What happens next?  To get started, I will call you. I ll ask you a few questions about your health and schedule a time to meet. You will have a chance to ask me questions, too.  Questions?  Call me at [phone] Monday-Friday between 8am and 5pm. TTY: 711. I look forward to speaking with you soon.  Sincerely,      Yudi Plascencia RN, PHN  [phone]  [email or web address]    cc: member records                                                                                                                [place]    Civil Rights Notice  Discrimination is against the law. [employer] does not discriminate on the basis of any of the following:    Race    Color    National Origin    Creed    Mandaen    Age    Public Assistance Status    Receipt of Health Care Services    Disability (including physical or mental impairment)    Sex (including sex stereotypes and gender identity)    Marital Status    Political Beliefs    Medical Condition    Genetic Information    Sexual Orientation    Claims Experience    Medical History    Health Status    Auxiliary Aids and Services:  [employer] provides auxiliary aids and services, like qualified interpreters or  information in accessible formats, free of charge and in a timely manner, to ensure an equal opportunity to participate in our health care programs. Contact [employer] at [employer]/contact medicaid or call [phone] (toll free); TTY:71 or at [email or web address].    Language Assistance Services:  [employer] provides translated documents and spoken language interpreting, free of charge and in a timely manner, when language assistance services are necessary to ensure limited English speakers have meaningful access to our information and services. Contact [employer] at [phone] (toll free); TTY: 714 or [email or web address].     Civil Rights Complaints  You have the right to file a discrimination complaint if you believe you were treated in a discriminatory way by [employer]. You may contact any of the following four agencies directly to file a discrimination complaint.      U.S. Department of Health and Human Services  Office for Civil Rights (OCR)  You have the right to file a complaint with the OCR, a federal agency, if you believe you have been discriminated against because of any of the following:    Race    Disability    Color    Sex    National Origin    Age    Moravian (in some cases)    Contact the OCR directly to file a complaint:         Director         U.S. Department of Health and Human Services  Office for Civil Rights         [address] [place] [address]         Customer Response Center: Toll-free: [phone]          TDD: [phone]         Email: [email or web address]    [place] Department of Human Rights (MDHR)  In [place], you have the right to file a complaint with the MD if you believe you have been discriminated against because of any of the following:      Race    Color    National Origin    Moravian    Creed    Sex    Sexual Orientation    Marital Status    Public Assistance Status    Disability    Contact the [facility]  directly to file a complaint:         [place] Department of Human Rights         [address]         [place] [address]         [phone] (voice)          [phone] (toll free)         711 or [phone] ([place] Relay)         [phone] (Fax)         [email or web address] (Email)     [place] Department of Human Services (DHS)  You have the right to file a complaint with [facility] if you believe you have been discriminated against in our health care programs because of any of the following:    Race    Color    National Origin    Creed    Anabaptist    Age    Public Assistance Status    Receipt of Health Care Services    Disability (including physical or mental impairment)    Sex (including sex stereotypes and gender identity)    Marital Status    Political Beliefs    Medical Condition    Genetic Information    Sexual Orientation    Claims Experience    Medical History    Health Status    Complaints must be in writing and filed within 180 days of the date you discovered the alleged discrimination. The complaint must contain your name and address and describe the discrimination you are complaining about. After we get your complaint, we will review it and notify you in writing about whether we have authority to investigate. If we do, we will investigate the complaint.      [facility] will notify you in writing of the investigation s outcome. You have a right to appeal the outcome if you disagree with the decision. To appeal, you must send a written request to have [facility] review the investigation outcome. Be brief and state why you disagree with the decision. Include additional information you think is important.      If you file a complaint in this way, the people who work for the agency named in the complaint cannot retaliate against you. This means they cannot punish you in any way for filing a complaint. Filing a complaint in this way does not stop you from seeking out other legal or  administration actions.     Contact DHS directly to file a discrimination complaint:        Civil Rights Coordinator        [facility]        Equal Opportunity and Access Division        [address] [address]        [place] [address] [phone] (voice) or use your preferred relay service     [employer] Complaint Notice   You have the right to file a complaint with [employer] if you believe you have been discriminated against because of any of the following:       Medical condition    Health status    Receipt of health care services    Claims experience    Medical history    Genetic information    Disability (including mental or physical impairment)    Marital status    Age    Sex (including sex stereotypes and gender identity)    Sexual orientation    National origin    Race    Color    Pentecostalism    Creed    Public assistance status    Political beliefs    You can file a complaint and ask for help in filing a complaint in person or by mail, phone, fax, or email at:     [employer] Civil Rights Coordinator  [employer]  [address], [place] [address] [phone] (voice and fax) or TTY:476  Email: [email or web address]    American Indians can begin or continue to use [facility] and [facility] clinics. We will not require prior approval or impose any conditions for you to get services at these clinics. For elders age 65 years and older this includes Elderly Waiver (EW) services accessed through the [employer]. If a doctor or other provider in a [facility] or [facility] clinic refers you to a provider in our network, we will not require you to see your primary care provider prior to the referral.

## 2022-07-13 NOTE — LETTER
July 28, 2022    Important [employer] Information    SHANTELLE MORTEZA HAMMOND  [address] [place] [address]  I've Tried to Contact You  Dear Shantelle,  My name is Yudi Plascencia RN, PHN, and I am your Care Coordinator. I have been trying to contact you, but have not been able to reach you.  As a Care Coordinator, I am here to help. My role is to make sure that your health plan is working for you. I am available to:     Review your health care needs with you over the phone or in-person     Provide support for and information about covered services or supplies to help keep you safe and healthy in your home    Answer questions about your insurance     Help you find a provider, such as a doctor or dentist, to meet your unique needs  I can also help you schedule a free physical at your clinic. To schedule an appointment, please call me at [phone] Monday-Friday between 8am-5pm TTY: 711.  I have included a copy of a Health Risk Assessment. Please fill it out and return it in the included envelope I have also included a document that provides you with more information about my role as your Care Coordinator and how I can help with your medical, social and everyday needs.     Questions?  Please call me at the phone number listed above. If you d like, a friend or family member may call for you.  For general questions, call [employer] Customer Service at [phone] or [phone] (toll free) from 8 a.m. - 8 p.m. Central, seven days a week. Access to representatives may be limited at times. TTY: 711.  Sincerely,    Yudi Plascencia RN, PHN  [phone]  [email or web address]    cc: member records                                                                                            CB5 ([facility] (5-2020)    Civil Rights Notice  Discrimination is against the law. [employer] does not discriminate on the basis of any of the following:    Race    Color    National Origin    Creed    Pentecostalism    Age    Public Assistance  Status    Receipt of Health Care Services    Disability (including physical or mental impairment)    Sex (including sex stereotypes and gender identity)    Marital Status    Political Beliefs    Medical Condition    Genetic Information    Sexual Orientation    Claims Experience    Medical History    Health Status    Auxiliary Aids and Services:  [employer] provides auxiliary aids and services, like qualified interpreters or information in accessible formats, free of charge and in a timely manner, to ensure an equal opportunity to participate in our health care programs. Contact [employer] at [employer]/contact medicaid or call [phone] (toll free); TTY:71 or at [email or web address].    Language Assistance Services:  [employer] provides translated documents and spoken language interpreting, free of charge and in a timely manner, when language assistance services are necessary to ensure limited English speakers have meaningful access to our information and services. Contact [employer] at [phone] (toll free); TTY: 357 or [email or web address].     Civil Rights Complaints  You have the right to file a discrimination complaint if you believe you were treated in a discriminatory way by [facility]. You may contact any of the following four agencies directly to file a discrimination complaint.        U.S. Department of Health and Human Services  Office for Civil Rights (OCR)  You have the right to file a complaint with the OCR, a federal agency, if you believe you have been discriminated against because of any of the following:    Race    Disability    Color    Sex    National Origin    Age    Gnosticism (in some cases)    Contact the OCR directly to file a complaint:         Director         U.S. Department of Health and Human Services  Office for Civil Rights         [address] [place] [address], [place] [address]         Customer Response Center: Toll-free: [phone]           TDD: [phone]         Email: [email or web address]    [place] Department of Human Rights (MDHR)  In [place], you have the right to file a complaint with the MDHR if you believe you have been discriminated against because of any of the following:      Race    Color    National Origin    Hindu    Creed    Sex    Sexual Orientation    Marital Status    Public Assistance Status    Disability    Contact the MDHR directly to file a complaint:         [place] Department of Human Rights         [address] [address]         [phone] (voice)          [phone] (toll free)         711 or [phone] ([place] Relay)         [phone] (Fax)         [email or web address]. (Email)     [place] Department of Human Services (DHS)  You have the right to file a complaint with [facility] if you believe you have been discriminated against in our health care programs because of any of the following:    Race    Color    National Origin    Creed    Hindu    Age    Public Assistance Status    Receipt of Health Care Services    Disability (including physical or mental impairment)    Sex (including sex stereotypes and gender identity)    Marital Status    Political Beliefs    Medical Condition    Genetic Information    Sexual Orientation    Claims Experience    Medical History    Health Status    Complaints must be in writing and filed within 180 days of the date you discovered the alleged discrimination. The complaint must contain your name and address and describe the discrimination you are complaining about. After we get your complaint, we will review it and notify you in writing about whether we have authority to investigate. If we do, we will investigate the complaint.      [facility] will notify you in writing of the investigation s outcome. You have a right to appeal the outcome if you disagree with the decision. To appeal, you must send a written request to have [facility] review  the investigation outcome. Be brief and state why you disagree with the decision. Include additional information you think is important.      If you file a complaint in this way, the people who work for the agency named in the complaint cannot retaliate against you. This means they cannot punish you in any way for filing a complaint. Filing a complaint in this way does not stop you from seeking out other legal or administration actions.     Contact DHS directly to file a discrimination complaint:        Civil Rights Coordinator        [place] Department of Human Services        Equal Opportunity and Access Division        [address] [address]        [place] [address] [phone] (voice) or use your preferred relay service     [facility] Complaint Notice   You have the right to file a complaint with [facility] if you believe you have been discriminated against because of any of the following:       Medical condition    Health status    Receipt of health care services    Claims experience    Medical history    Genetic information    Disability (including mental or physical impairment)    Marital status    Age    Sex (including sex stereotypes and gender identity)    Sexual orientation    National origin    Race    Color    Uatsdin    Creed    Public assistance status    Political beliefs    You can file a complaint and ask for help in filing a complaint in person or by mail, phone, fax, or email at:     [facility] Civil Rights Coordinator  [facility]  [address], [place] [address] [phone] (voice and fax) or TTR:047  Email: [email or web address]    American Indians can begin or continue to use [facility] [place] [facility] and [facility] clinics. We will not require prior approval or impose any conditions for you to get services at these clinics. For elders age 65 years and older this includes Elderly Waiver (EW) services accessed through the [employer]. If a doctor or other  provider in a [facility] or [facility] clinic refers you to a provider in our network, we will not require you to see your primary care provider prior to the referral.

## 2022-07-13 NOTE — PROGRESS NOTES
[facility] Care Coordination Contact    Member became effective with [facility] on 07/01/2022 with [facility].  Previous Health Plan: [employer]  Previous Care System: [facility] care coordinators name and number: Bernie Dos Santos  [place] Type: N/A  Last MMIS Entry: Date 02/18/21 and Type: Refusal  MMIS visit date (and type) if different from above: 06.19.18 refusal   Services Listed in MMIS:   [facility] received: UTF  request by manager    Corina Liu  Care Management Specialist  [facility]  [phone]

## 2022-07-14 NOTE — PROGRESS NOTES
[facility] Care Coordination Contact    Called member to schedule annual HRA home visit. Left a message requesting a return call to schedule HRA.   Yudi Plascencia RN PHN  [facility]  [phone]

## 2022-07-21 NOTE — PROGRESS NOTES
[facility] Care Coordination Contact    Called member to schedule annual HRA home visit. Left a message requesting a return call to schedule HRA.     Yudi Plascencia RN PHN  [facility]  [phone]

## 2022-07-22 NOTE — PROGRESS NOTES
Called member to schedule annual HRA home visit. Left a message requesting a return call to schedule HRA.     Yudi Plascencia RN N  [facility]  [phone]

## 2022-07-27 ASSESSMENT — ACTIVITIES OF DAILY LIVING (ADL): CURRENT_FUNCTION: NO ASSISTANCE NEEDED

## 2022-07-27 ASSESSMENT — ENCOUNTER SYMPTOMS
SHORTNESS OF BREATH: 0
HEMATURIA: 0
EYE PAIN: 0
PARESTHESIAS: 0
ABDOMINAL PAIN: 0
DYSURIA: 0
ARTHRALGIAS: 1
DIARRHEA: 0
FEVER: 0
HEADACHES: 1
JOINT SWELLING: 1
BREAST MASS: 0
NAUSEA: 0
MYALGIAS: 1
SORE THROAT: 0
WEAKNESS: 1
DIZZINESS: 0
FREQUENCY: 1
CHILLS: 0
HEMATOCHEZIA: 0
HEARTBURN: 0
COUGH: 0
NERVOUS/ANXIOUS: 1
PALPITATIONS: 0
CONSTIPATION: 0

## 2022-07-27 NOTE — PROGRESS NOTES
Called member to schedule annual HRA home visit. Left a message requesting a return call to schedule HRA. No return calls. Will mail letter.    Yudi Plascencia RN N  [facility]  [phone]

## 2022-07-27 NOTE — PROGRESS NOTES
[facility] Care Coordination Contact    Completed 4 attempts to reach client with no response.  Member is officially unable to contact effective today.  Completed MMIS entry.  Completed health plan required [facility] POC.    Follow-up Plan: CC will attempt to reach member in six months.    This CC note routed to PCP.    Yudi Plascencia RN PHN  [facility]  [phone]

## 2022-07-28 ENCOUNTER — OFFICE VISIT (OUTPATIENT)
Dept: INTERNAL MEDICINE | Facility: CLINIC | Age: 67
End: 2022-07-28
Payer: COMMERCIAL

## 2022-07-28 VITALS
DIASTOLIC BLOOD PRESSURE: 72 MMHG | WEIGHT: 102.69 LBS | OXYGEN SATURATION: 97 % | BODY MASS INDEX: 18.63 KG/M2 | SYSTOLIC BLOOD PRESSURE: 142 MMHG | RESPIRATION RATE: 14 BRPM | HEART RATE: 83 BPM

## 2022-07-28 DIAGNOSIS — E55.9 VITAMIN D INSUFFICIENCY: ICD-10-CM

## 2022-07-28 DIAGNOSIS — M81.0 AGE-RELATED OSTEOPOROSIS WITHOUT CURRENT PATHOLOGICAL FRACTURE: ICD-10-CM

## 2022-07-28 DIAGNOSIS — Z23 HIGH PRIORITY FOR 2019-NCOV VACCINE: ICD-10-CM

## 2022-07-28 DIAGNOSIS — B18.2 CHRONIC HEPATITIS C WITHOUT HEPATIC COMA (H): Primary | ICD-10-CM

## 2022-07-28 DIAGNOSIS — Z87.891 PERSONAL HISTORY OF TOBACCO USE, PRESENTING HAZARDS TO HEALTH: ICD-10-CM

## 2022-07-28 DIAGNOSIS — M54.50 CHRONIC BILATERAL LOW BACK PAIN WITHOUT SCIATICA: ICD-10-CM

## 2022-07-28 DIAGNOSIS — Z00.00 HEALTH MAINTENANCE EXAMINATION: ICD-10-CM

## 2022-07-28 DIAGNOSIS — G89.29 CHRONIC BILATERAL LOW BACK PAIN WITHOUT SCIATICA: ICD-10-CM

## 2022-07-28 DIAGNOSIS — Z00.00 ENCOUNTER FOR ANNUAL WELLNESS EXAM IN MEDICARE PATIENT: ICD-10-CM

## 2022-07-28 DIAGNOSIS — Z86.0100 HISTORY OF COLONIC POLYPS: ICD-10-CM

## 2022-07-28 PROBLEM — J18.9 PNEUMONIA: Status: ACTIVE | Noted: 2022-07-28

## 2022-07-28 PROBLEM — H61.20 CERUMEN IMPACTION: Status: ACTIVE | Noted: 2022-07-28

## 2022-07-28 PROBLEM — J06.9 UPPER RESPIRATORY INFECTION: Status: ACTIVE | Noted: 2022-07-28

## 2022-07-28 PROBLEM — R07.9 CHEST PAIN: Status: ACTIVE | Noted: 2022-07-28

## 2022-07-28 PROBLEM — J01.90 ACUTE SINUSITIS: Status: ACTIVE | Noted: 2022-07-28

## 2022-07-28 PROBLEM — M79.609 LIMB PAIN: Status: ACTIVE | Noted: 2022-07-28

## 2022-07-28 LAB
ALBUMIN SERPL BCG-MCNC: 4.2 G/DL (ref 3.5–5.2)
ALP SERPL-CCNC: 78 U/L (ref 35–104)
ALT SERPL W P-5'-P-CCNC: 28 U/L (ref 10–35)
ANION GAP SERPL CALCULATED.3IONS-SCNC: 7 MMOL/L (ref 7–15)
AST SERPL W P-5'-P-CCNC: 45 U/L (ref 10–35)
BILIRUB DIRECT SERPL-MCNC: <0.2 MG/DL (ref 0–0.3)
BILIRUB SERPL-MCNC: 0.4 MG/DL
BUN SERPL-MCNC: 12.8 MG/DL (ref 8–23)
CALCIUM SERPL-MCNC: 9.8 MG/DL (ref 8.8–10.2)
CHLORIDE SERPL-SCNC: 102 MMOL/L (ref 98–107)
CREAT SERPL-MCNC: 0.72 MG/DL (ref 0.51–0.95)
DEPRECATED HCO3 PLAS-SCNC: 29 MMOL/L (ref 22–29)
ERYTHROCYTE [DISTWIDTH] IN BLOOD BY AUTOMATED COUNT: 13.3 % (ref 10–15)
GFR SERPL CREATININE-BSD FRML MDRD: 90 ML/MIN/1.73M2
GLUCOSE SERPL-MCNC: 94 MG/DL (ref 70–99)
HCT VFR BLD AUTO: 44.7 % (ref 35–47)
HGB BLD-MCNC: 15.2 G/DL (ref 11.7–15.7)
MCH RBC QN AUTO: 33.4 PG (ref 26.5–33)
MCHC RBC AUTO-ENTMCNC: 34 G/DL (ref 31.5–36.5)
MCV RBC AUTO: 98 FL (ref 78–100)
PLATELET # BLD AUTO: 155 10E3/UL (ref 150–450)
POTASSIUM SERPL-SCNC: 3.7 MMOL/L (ref 3.4–5.3)
PROT SERPL-MCNC: 7.5 G/DL (ref 6.4–8.3)
PTH-INTACT SERPL-MCNC: 34 PG/ML (ref 15–65)
RBC # BLD AUTO: 4.55 10E6/UL (ref 3.8–5.2)
SODIUM SERPL-SCNC: 138 MMOL/L (ref 136–145)
WBC # BLD AUTO: 8.4 10E3/UL (ref 4–11)

## 2022-07-28 PROCEDURE — 91305 COVID-19,PF,PFIZER (12+ YRS): CPT | Performed by: INTERNAL MEDICINE

## 2022-07-28 PROCEDURE — 99213 OFFICE O/P EST LOW 20 MIN: CPT | Mod: 25 | Performed by: INTERNAL MEDICINE

## 2022-07-28 PROCEDURE — 83970 ASSAY OF PARATHORMONE: CPT | Performed by: INTERNAL MEDICINE

## 2022-07-28 PROCEDURE — 85027 COMPLETE CBC AUTOMATED: CPT | Performed by: INTERNAL MEDICINE

## 2022-07-28 PROCEDURE — 82306 VITAMIN D 25 HYDROXY: CPT | Performed by: INTERNAL MEDICINE

## 2022-07-28 PROCEDURE — 0054A COVID-19,PF,PFIZER (12+ YRS): CPT | Performed by: INTERNAL MEDICINE

## 2022-07-28 PROCEDURE — 36415 COLL VENOUS BLD VENIPUNCTURE: CPT | Performed by: INTERNAL MEDICINE

## 2022-07-28 PROCEDURE — 82248 BILIRUBIN DIRECT: CPT | Performed by: INTERNAL MEDICINE

## 2022-07-28 PROCEDURE — 87340 HEPATITIS B SURFACE AG IA: CPT | Performed by: INTERNAL MEDICINE

## 2022-07-28 PROCEDURE — 80053 COMPREHEN METABOLIC PANEL: CPT | Performed by: INTERNAL MEDICINE

## 2022-07-28 PROCEDURE — G0438 PPPS, INITIAL VISIT: HCPCS | Performed by: INTERNAL MEDICINE

## 2022-07-28 PROCEDURE — 87389 HIV-1 AG W/HIV-1&-2 AB AG IA: CPT | Performed by: INTERNAL MEDICINE

## 2022-07-28 RX ORDER — ALENDRONATE SODIUM 70 MG/1
70 TABLET ORAL
Qty: 12 TABLET | Refills: 3 | Status: SHIPPED | OUTPATIENT
Start: 2022-07-28 | End: 2022-11-09

## 2022-07-28 ASSESSMENT — ENCOUNTER SYMPTOMS
SHORTNESS OF BREATH: 0
COUGH: 0
DYSURIA: 0
FREQUENCY: 1
CONSTIPATION: 0
SORE THROAT: 0
CHILLS: 0
ABDOMINAL PAIN: 0
NAUSEA: 0
MYALGIAS: 1
JOINT SWELLING: 1
PARESTHESIAS: 0
DIZZINESS: 0
PALPITATIONS: 0
BREAST MASS: 0
HEADACHES: 1
HEMATURIA: 0
DIARRHEA: 0
FEVER: 0
ARTHRALGIAS: 1
HEMATOCHEZIA: 0
NERVOUS/ANXIOUS: 1
WEAKNESS: 1
HEARTBURN: 0
EYE PAIN: 0

## 2022-07-28 ASSESSMENT — ACTIVITIES OF DAILY LIVING (ADL): CURRENT_FUNCTION: NO ASSISTANCE NEEDED

## 2022-07-28 ASSESSMENT — PAIN SCALES - GENERAL: PAINLEVEL: NO PAIN (0)

## 2022-07-28 NOTE — PATIENT INSTRUCTIONS
You have osteoporosis and you need to take 1200mg of calcium and 2000 units of vit d in supplements     And the new medication to help your bones once a week ( fosamax)    You have high count of active hepatitis C . It is strongly recommended to take antiviral treatment and stop alcohol use while on it     You do have a higher risk for lung cancer ,. So I have ordered a lung cancer scan to be done every year

## 2022-07-28 NOTE — PROGRESS NOTES
"[facility] Care Coordination Contact    Per CC, mailed client an \"Unable to Contact\" letter.  Mailed member [employer] Self Report Health Risk Assessment with self-addressed return envelope & supporting documents as required.       Corina Liu  Care Management Specialist  [facility]  [phone]        "

## 2022-07-28 NOTE — PROGRESS NOTES
"SUBJECTIVE:   Shantelle Taveras is a 67 year old female who presents for Preventive Visit.  She has a h/o chronic hep c ,and osteoporosis .     Patient has been advised of split billing requirements and indicates understanding: Yes  Are you in the first 12 months of your Medicare coverage?  No    Imm/Inj  Associated symptoms include arthralgias, headaches, joint swelling, myalgias and weakness. Pertinent negatives include no abdominal pain, chest pain, chills, congestion, coughing, fever, nausea, rash or sore throat.   Healthy Habits:     In general, how would you rate your overall health?  Good    Frequency of exercise:  4-5 days/week    Duration of exercise:  30-45 minutes    Do you usually eat at least 4 servings of fruit and vegetables a day, include whole grains    & fiber and avoid regularly eating high fat or \"junk\" foods?  Yes    Taking medications regularly:  No    Barriers to taking medications:  Side effects    Medication side effects:  Muscle aches, Significant flushing, Lightheadedness and Other    Ability to successfully perform activities of daily living:  No assistance needed    Home Safety:  No safety concerns identified    Hearing Impairment:  No hearing concerns    In the past 6 months, have you been bothered by leaking of urine? Yes    In general, how would you rate your overall mental or emotional health?  Good      PHQ-2 Total Score: 0    Additional concerns today:  Yes    Do you feel safe in your environment? Yes    Have you ever done Advance Care Planning? (For example, a Health Directive, POLST, or a discussion with a medical provider or your loved ones about your wishes): Yes, patient states has an Advance Care Planning document and will bring a copy to the clinic.       Fall risk  Fallen 2 or more times in the past year?: No  Any fall with injury in the past year?: No    Cognitive Screening   1) Repeat 3 items (Leader, Season, Table)    2) Clock draw: NORMAL  3) 3 item recall: Recalls 1 " object   Results: NORMAL clock, 1-2 items recalled: COGNITIVE IMPAIRMENT LESS LIKELY    Mini-CogTM Copyright QI Billings. Licensed by the author for use in [facility]; reprinted with permission (ynes@[facility]). All rights reserved.      Do you have sleep apnea, excessive snoring or daytime drowsiness?: no    Reviewed and updated as needed this visit by clinical staff   Tobacco  Allergies  Meds   Med Hx  Surg Hx  Fam Hx  Soc Hx          Reviewed and updated as needed this visit by Provider                   Social History     Tobacco Use     Smoking status: Current Every Day Smoker     Packs/day: 0.50     Years: 50.00     Pack years: 25.00     Types: Cigarettes     Smokeless tobacco: Never Used   Substance Use Topics     Alcohol use: Yes     Comment: social     Patient Active Problem List   Diagnosis     Chest Pain     Otitis Media     Acute Sinusitis     Cerumen Impaction     Upper Respiratory Infection     Pneumonia     Imaging Studies Nonspecific Abnormal Findings Breast     Mammogram Inconclusive Due To Dense Breasts     Limb Pain     History of colonic polyps     Hepatitis C, chronic (H)     Personal history of tobacco use, presenting hazards to health     Pneumonia     Acute sinusitis     Upper respiratory infection     Chest pain     Cerumen impaction     Limb pain     Health maintenance examination     Current Outpatient Medications   Medication     alendronate (FOSAMAX) 70 MG tablet     diclofenac (VOLTAREN) 1 % topical gel     fexofenadine (ALLEGRA) 180 MG tablet     nicotine (NICORETTE) 4 MG lozenge     No current facility-administered medications for this visit.         Alcohol Use 7/27/2022   Prescreen: >3 drinks/day or >7 drinks/week? No               Current providers sharing in care for this patient include:   Patient Care Team:  Brigida Godinez MD as PCP - General  Brigida Godinez MD as Assigned PCP  Yudi Plascencia RN as Lead Care Coordinator (Primary Care - CC)    The following  health maintenance items are reviewed in Epic and correct as of today:  Health Maintenance Due   Topic Date Due     NICOTINE/TOBACCO CESSATION COUNSELING Q 1 YR  Never done     ANNUAL REVIEW OF HM ORDERS  Never done     ZOSTER IMMUNIZATION (1 of 2) Never done     LUNG CANCER SCREENING  Never done     HEPATITIS B IMMUNIZATION (2 of 3 - Risk 3-dose series) 03/25/2021     MEDICARE ANNUAL WELLNESS VISIT  11/20/2021     COVID-19 Vaccine (4 - Booster for Pfizer series) 02/28/2022           Breast CA Risk Assessment (FHS-7) 7/27/2022   Do you have a family history of breast, colon, or ovarian cancer? No / Unknown           Pertinent mammograms are reviewed under the imaging tab.    Review of Systems   Constitutional: Negative for chills and fever.   HENT: Negative for congestion, ear pain, hearing loss and sore throat.    Eyes: Positive for visual disturbance. Negative for pain.   Respiratory: Negative for cough and shortness of breath.    Cardiovascular: Negative for chest pain, palpitations and peripheral edema.   Gastrointestinal: Negative for abdominal pain, constipation, diarrhea, heartburn, hematochezia and nausea.   Breasts:  Negative for tenderness, breast mass and discharge.   Genitourinary: Positive for frequency, urgency and vaginal discharge. Negative for dysuria, genital sores, hematuria, pelvic pain and vaginal bleeding.   Musculoskeletal: Positive for arthralgias, joint swelling and myalgias.   Skin: Negative for rash.   Neurological: Positive for weakness and headaches. Negative for dizziness and paresthesias.   Psychiatric/Behavioral: Positive for mood changes. The patient is nervous/anxious.      Constitutional, HEENT, cardiovascular, pulmonary, gi and gu systems are negative, except as otherwise noted.    OBJECTIVE:   BP (!) 142/72 (BP Location: Left arm, Patient Position: Sitting, Cuff Size: Adult Large)   Pulse 83   Resp 14   Wt 46.6 kg (102 lb 11 oz)   SpO2 97%   BMI 18.63 kg/m   Estimated body  "mass index is 18.63 kg/m  as calculated from the following:    Height as of 1/19/22: 1.581 m (5' 2.25\").    Weight as of this encounter: 46.6 kg (102 lb 11 oz).  Physical Exam  GENERAL: healthy, alert and no distress  NECK: no adenopathy, no asymmetry, masses, or scars and thyroid normal to palpation  RESP: lungs clear to auscultation - no rales, rhonchi or wheezes  CV: regular rate and rhythm, normal S1 S2, no S3 or S4, no murmur, click or rub, no peripheral edema and peripheral pulses strong  ABDOMEN: soft, nontender, no hepatosplenomegaly, no masses and bowel sounds normal  MS: no gross musculoskeletal defects noted, no edema    Diagnostic Test Results:  Labs reviewed in Epic    ASSESSMENT / PLAN:   (B18.2) Chronic hepatitis C without hepatic coma (H)  (primary encounter diagnosis)  Comment: chronic active hep c without liver disease . She does meet criteria for treatment . High viral load and fluctuating liver tests . She does have a h/o intermittent alcohol use . counselled at length . In the past she has not folllowed up on this . Recommend GI referral , abstaining from alcohol    Plan: Adult GI  Referral - Consult Only,         Hepatitis B surface antigen, HIV Antigen         Antibody Combo, Hepatic panel (Albumin, ALT,         AST, Bili, Alk Phos, TP), Parathyroid Hormone         Intact, Basic metabolic panel  (Ca, Cl, CO2,         Creat, Gluc, K, Na, BUN), CBC with platelets            (Z87.891) Personal history of tobacco use, presenting hazards to health  Comment: she has cut down to half a pack but used to smoke 2 pck a day and has more than a 40 pack year smoking history . Recommend lung cancer screening . She has tried and failed chantix and patches . Is open to lozenges ( cannot use gum due to dentures )   Plan: TOBACCO CESSATION ORDER FOR , CT Chest Lung         Cancer Scrn Low Dose wo, nicotine (NICORETTE) 4        MG lozenge            (Z86.010) History of colonic polyps  Comment: is up " "to date on scopes   Plan:     (M54.50,  G89.29) Chronic bilateral low back pain without sciatica  Comment: no radicular signs . Is open to PT   Plan: Physical Therapy Referral            (Z00.00) Encounter for annual wellness exam in Medicare patient  Comment: reviewed health maintenance   Plan:     (Z00.00) Health maintenance examination  Comment:   Plan:     (Z23) High priority for 2019-nCoV vaccine  Comment:   Plan: COVID-19,PF,PFIZER (12+ Yrs GRAY LABEL)            (M81.0) Age-related osteoporosis without current pathological fracture  Comment:  Was diagnosed last year but unclear if she responded to result message  . Recommend fosamax . expplained risks vs benefits   Plan: alendronate (FOSAMAX) 70 MG tablet            (E55.9) Vitamin D insufficiency  Comment:   Plan: Vitamin D Deficiency                  COUNSELING:  Reviewed preventive health counseling, as reflected in patient instructions    Estimated body mass index is 18.63 kg/m  as calculated from the following:    Height as of 1/19/22: 1.581 m (5' 2.25\").    Weight as of this encounter: 46.6 kg (102 lb 11 oz).        She reports that she has been smoking cigarettes. She has a 25.00 pack-year smoking history. She has never used smokeless tobacco.  Nicotine/Tobacco Cessation Plan:   Pharmacotherapies : Nicotine lozenge      Appropriate preventive services were discussed with this patient, including applicable screening as appropriate for cardiovascular disease, diabetes, osteopenia/osteoporosis, and glaucoma.  As appropriate for age/gender, discussed screening for colorectal cancer, prostate cancer, breast cancer, and cervical cancer. Checklist reviewing preventive services available has been given to the patient.    Reviewed patients plan of care and provided an AVS. The Basic Care Plan (routine screening as documented in Health Maintenance) for Shantelle meets the Care Plan requirement. This Care Plan has been established and reviewed with the " Patient.    Counseling Resources:  ATP IV Guidelines  Pooled Cohorts Equation Calculator  Breast Cancer Risk Calculator  Breast Cancer: Medication to Reduce Risk  FRAX Risk Assessment  ICSI Preventive Guidelines  Dietary Guidelines for Americans, 2010  USDA's MyPlate  ASA Prophylaxis  Lung CA Screening    Brigida Godinez MD  [facility]    Identified Health Risks:

## 2022-07-29 LAB
DEPRECATED CALCIDIOL+CALCIFEROL SERPL-MC: 24 UG/L (ref 20–75)
HBV SURFACE AG SERPL QL IA: NONREACTIVE
HIV 1+2 AB+HIV1 P24 AG SERPL QL IA: NONREACTIVE

## 2022-08-19 ENCOUNTER — PATIENT OUTREACH (OUTPATIENT)
Dept: GERIATRIC MEDICINE | Facility: CLINIC | Age: 67
End: 2022-08-19

## 2022-08-19 NOTE — PROGRESS NOTES
CC updated program tasks and targets for [employer] tanna launch    Yudi Plascencia RN N  [facility]  [phone]

## 2022-10-01 ENCOUNTER — HEALTH MAINTENANCE LETTER (OUTPATIENT)
Age: 67
End: 2022-10-01

## 2022-10-18 ENCOUNTER — IMMUNIZATION (OUTPATIENT)
Dept: NURSING | Facility: CLINIC | Age: 67
End: 2022-10-18
Payer: COMMERCIAL

## 2022-10-18 PROCEDURE — G0008 ADMIN INFLUENZA VIRUS VAC: HCPCS

## 2022-10-18 PROCEDURE — 90662 IIV NO PRSV INCREASED AG IM: CPT

## 2022-10-31 ENCOUNTER — MYC MEDICAL ADVICE (OUTPATIENT)
Dept: INTERNAL MEDICINE | Facility: CLINIC | Age: 67
End: 2022-10-31

## 2022-11-04 NOTE — TELEPHONE ENCOUNTER
Called pt and scheduled an appt with Dr Godinez for 11/9/2022.     Pt states cough has been better but still have mucus. Covid test has been negative.

## 2022-11-09 ENCOUNTER — OFFICE VISIT (OUTPATIENT)
Dept: INTERNAL MEDICINE | Facility: CLINIC | Age: 67
End: 2022-11-09
Payer: COMMERCIAL

## 2022-11-09 VITALS
TEMPERATURE: 97.7 F | DIASTOLIC BLOOD PRESSURE: 90 MMHG | HEART RATE: 74 BPM | WEIGHT: 104.9 LBS | SYSTOLIC BLOOD PRESSURE: 172 MMHG | HEIGHT: 64 IN | OXYGEN SATURATION: 99 % | BODY MASS INDEX: 17.91 KG/M2

## 2022-11-09 DIAGNOSIS — J44.9 CHRONIC OBSTRUCTIVE PULMONARY DISEASE, UNSPECIFIED COPD TYPE (H): Primary | ICD-10-CM

## 2022-11-09 DIAGNOSIS — I10 HYPERTENSION, UNSPECIFIED TYPE: ICD-10-CM

## 2022-11-09 DIAGNOSIS — B18.2 CHRONIC HEPATITIS C WITHOUT HEPATIC COMA (H): ICD-10-CM

## 2022-11-09 DIAGNOSIS — J06.9 VIRAL UPPER RESPIRATORY TRACT INFECTION: ICD-10-CM

## 2022-11-09 DIAGNOSIS — Z00.00 HEALTH MAINTENANCE EXAMINATION: ICD-10-CM

## 2022-11-09 PROCEDURE — 99214 OFFICE O/P EST MOD 30 MIN: CPT | Mod: 25 | Performed by: INTERNAL MEDICINE

## 2022-11-09 PROCEDURE — 0124A COVID-19,PF,PFIZER BOOSTER BIVALENT: CPT | Performed by: INTERNAL MEDICINE

## 2022-11-09 PROCEDURE — 91312 COVID-19,PF,PFIZER BOOSTER BIVALENT: CPT | Performed by: INTERNAL MEDICINE

## 2022-11-09 RX ORDER — FLUTICASONE PROPIONATE AND SALMETEROL 250; 50 UG/1; UG/1
1 POWDER RESPIRATORY (INHALATION) EVERY 12 HOURS
Qty: 14 EACH | Refills: 3 | Status: SHIPPED | OUTPATIENT
Start: 2022-11-09 | End: 2023-07-13

## 2022-11-09 NOTE — PROGRESS NOTES
Assessment & Plan     Chronic obstructive pulmonary disease, unspecified COPD type (H)  He has bilateral diminished air entry without wheeze productive cough fever or chest pain.  I do believe she has emphysema and every viral infection will cause exacerbation of shortness of breath.  She should try dual inhalers.  - tiotropium (SPIRIVA RESPIMAT) 2.5 MCG/ACT inhaler  Dispense: 4 g; Refill: 4  - fluticasone-salmeterol (ADVAIR) 250-50 MCG/ACT inhaler  Dispense: 14 each; Refill: 3    Viral upper respiratory tract infection  Defer antibiotics and prednisone because she is not actively wheezing    Chronic hepatitis C without hepatic coma (H)  She has chronic active hepatitis C.  But she continues to drink alcohol.  She is not willing to see a GI doctor outside [place].  At any rate unless she quits alcohol she would not really qualify for oral medications.  She also is not likely to be compliant.  We will review approach and see if there is a provider that she could go to in [facility] or [place].  But I doubt compliant she will be compliant.  She has been told repeatedly about the dangers of untreated hepatitis C    Health maintenance examination  She is actually up-to-date in her mammogram bone density and colonoscopy  She also did her annual lung cancer screening test  She is agreeable to immunizations as well she already got the flu shot she will get the COVID omicron shot  Hypertension, unspecified type  She has no prior history of hypertension this elevation is unusual she is currently drinking a Pepsi in front of me.  She declines taking any kind of stimulants.  It is possible that this is due to some thing that she has ingested.  I have urged her to come back in 1 week.  She categorically declines the options of medications.  She never took her osteoporosis medication either.                     No follow-ups on file.    Brigida Godinez MD  [facility]    [place]   Shantelle is a 67  "year old accompanied by her ALONE, presenting for the following health issues:  Recheck Medication (PT REPORT COLD LIKE SYMPTOMS)    Very pleasant but with a history of noncompliance of medications.  History of Present Illness       Reason for visit:  Presciption for antibotic  Symptom onset:  3-7 days ago  Symptoms include:  Flim made breathing hard  Symptom intensity:  Mild  Symptom progression:  Improving  Had these symptoms before:  Yes  Has tried/received treatment for these symptoms:  Yes  Previous treatment was successful:  Yes  Prior treatment description:  Antibotic  What makes it worse:  Cough  What makes it better:  Antibotic    She eats 2-3 servings of fruits and vegetables daily.She consumes 1 sweetened beverage(s) daily.She exercises with enough effort to increase her heart rate 20 to 29 minutes per day.  She exercises with enough effort to increase her heart rate 5 days per week. She is missing 1 dose(s) of medications per week.  She is not taking prescribed medications regularly due to remembering to take.         Patient Active Problem List   Diagnosis     Chest Pain     Otitis Media     Acute Sinusitis     Cerumen Impaction     Upper Respiratory Infection     Pneumonia     Imaging Studies Nonspecific Abnormal Findings Breast     Mammogram Inconclusive Due To Dense Breasts     Limb Pain     History of colonic polyps     Hepatitis C, chronic (H)     Personal history of tobacco use, presenting hazards to health     Pneumonia     Acute sinusitis     Upper respiratory infection     Chest pain     Cerumen impaction     Limb pain     Health maintenance examination     meds none       Review of Systems         Objective    BP (!) 172/90 (BP Location: Left arm, Patient Position: Sitting, Cuff Size: Adult Small)   Pulse 74   Temp 97.7  F (36.5  C) (Tympanic)   Ht 1.626 m (5' 4\")   Wt 47.6 kg (104 lb 14.4 oz)   SpO2 99%   BMI 18.01 kg/m    There is no height or weight on file to calculate BMI.  Physical " Exam   GENERAL: healthy, alert and no distress  NECK: no adenopathy, no asymmetry, masses, or scars and thyroid normal to palpation  RESP: lungs clear to auscultation - no rales, rhonchi or wheezes  CV: regular rate and rhythm, normal S1 S2, no S3 or S4, no murmur, click or rub, no peripheral edema and peripheral pulses strong  ABDOMEN: soft, nontender, no hepatosplenomegaly, no masses and bowel sounds normal  M

## 2022-11-17 ENCOUNTER — ALLIED HEALTH/NURSE VISIT (OUTPATIENT)
Dept: FAMILY MEDICINE | Facility: CLINIC | Age: 67
End: 2022-11-17
Payer: COMMERCIAL

## 2022-11-17 VITALS — DIASTOLIC BLOOD PRESSURE: 88 MMHG | OXYGEN SATURATION: 98 % | SYSTOLIC BLOOD PRESSURE: 144 MMHG | HEART RATE: 82 BPM

## 2022-11-17 DIAGNOSIS — I10 HTN (HYPERTENSION): Primary | ICD-10-CM

## 2022-11-17 PROCEDURE — 99207 PR NO CHARGE NURSE ONLY: CPT

## 2022-11-17 NOTE — PROGRESS NOTES
Shantelle Mota arrives in clinic today for a blood pressure check. Please review vitals signs for details    Initial check 160/90  Last check 144/88

## 2023-02-20 DIAGNOSIS — M79.645 PAIN OF FINGER OF LEFT HAND: ICD-10-CM

## 2023-02-20 NOTE — TELEPHONE ENCOUNTER
"Routing refill request to provider for review/approval because:  A break in medication    Last Written Prescription Date:  9/1/2021  Last Fill Quantity: 100 g,  # refills: 3   Last office visit provider:  11/9/2022 - Dr. Godinez     Requested Prescriptions   Pending Prescriptions Disp Refills     diclofenac (VOLTAREN) 1 % topical gel [Pharmacy Med Name: Diclofenac Sodium External Gel 1 %] 100 g 0     Sig: Apply 1 g topically 4 times daily       Topical Steroids and Nonsteroidals Protocol Passed - 2/20/2023 10:17 AM        Passed - Patient is age 6 or older        Passed - Authorizing prescriber's most recent note related to this medication read.     If refill request is for ophthalmic use, please forward request to provider for approval.          Passed - High potency steroid not ordered        Passed - Recent (12 mo) or future (30 days) visit within the authorizing provider's specialty     Patient has had an office visit with the authorizing provider or a provider within the authorizing providers department within the previous 12 mos or has a future within next 30 days. See \"Patient Info\" tab in inbasket, or \"Choose Columns\" in Meds & Orders section of the refill encounter.              Passed - Medication is active on med list             Kirsty Calderon RN 02/20/23 2:34 PM  "

## 2023-02-24 DIAGNOSIS — M79.645 PAIN OF FINGER OF LEFT HAND: ICD-10-CM

## 2023-02-24 NOTE — TELEPHONE ENCOUNTER
The pharmacy need the location of application    [facility]

## 2023-03-03 ENCOUNTER — MYC MEDICAL ADVICE (OUTPATIENT)
Dept: INTERNAL MEDICINE | Facility: CLINIC | Age: 68
End: 2023-03-03
Payer: COMMERCIAL

## 2023-03-17 ENCOUNTER — PATIENT OUTREACH (OUTPATIENT)
Dept: GERIATRIC MEDICINE | Facility: CLINIC | Age: 68
End: 2023-03-17
Payer: COMMERCIAL

## 2023-03-17 NOTE — PROGRESS NOTES
[facility] Care Coordination Contact      [facility] Six-Month Telephone Assessment    6 month telephone assessment completed on 03/17/2023.    ER visits: No  Hospitalizations: No  TCU stays: No  Significant health status changes: NA  Falls/Injuries: No  ADL/IADL changes: No  Changes in services: No    Caregiver Assessment follow up:  NA    Goals: See POC in chart for goal progress documentation.  Shantelle reported that she is doing good and has no new needs.      Will see member in 6 months for an annual health risk assessment.   Encouraged member to call CC with any questions or concerns in the meantime.     JONATHAN Beckwith  [facility]  Phone: [phone]

## 2023-03-27 ENCOUNTER — MYC MEDICAL ADVICE (OUTPATIENT)
Dept: INTERNAL MEDICINE | Facility: CLINIC | Age: 68
End: 2023-03-27
Payer: COMMERCIAL

## 2023-04-19 ENCOUNTER — MYC MEDICAL ADVICE (OUTPATIENT)
Dept: INTERNAL MEDICINE | Facility: CLINIC | Age: 68
End: 2023-04-19
Payer: COMMERCIAL

## 2023-04-19 DIAGNOSIS — N39.41 URGE INCONTINENCE OF URINE: ICD-10-CM

## 2023-04-19 DIAGNOSIS — F41.9 ANXIETY: ICD-10-CM

## 2023-04-24 RX ORDER — LORAZEPAM 0.5 MG/1
0.5 TABLET ORAL EVERY 6 HOURS PRN
Qty: 10 TABLET | Refills: 1 | Status: SHIPPED | OUTPATIENT
Start: 2023-04-24 | End: 2023-07-13

## 2023-04-24 RX ORDER — OXYBUTYNIN CHLORIDE 5 MG/1
5 TABLET, EXTENDED RELEASE ORAL DAILY
Qty: 34 TABLET | Refills: 3 | Status: SHIPPED | OUTPATIENT
Start: 2023-04-24 | End: 2023-07-13

## 2023-04-24 NOTE — TELEPHONE ENCOUNTER
Attempted to reach patient to gather more information regarding medication request.  Is patient having UTI symptoms or is this for bladder control?  Per chart review, for bladder control in the past, patient was on:     oxybutynin (DITROPAN XL) 5 MG ER tablet; Take 1 tablet (5 mg total) by mouth daily.

## 2023-04-28 ENCOUNTER — TELEPHONE (OUTPATIENT)
Dept: INTERNAL MEDICINE | Facility: CLINIC | Age: 68
End: 2023-04-28

## 2023-04-28 NOTE — TELEPHONE ENCOUNTER
Prior Authorization Retail Medication Request    Medication/Dose:    Disp Refills Start End DRAKE   LORazepam (ATIVAN) 0.5 MG tablet 10 tablet 1 4/24/2023  No   Sig - Route: Take 1 tablet (0.5 mg) by mouth every 6 hours as needed for anxiety - Oral     ICD code (if different than what is on RX):    Previously Tried and Failed:    Rationale:      Insurance Name:    Insurance ID:        Pharmacy Information (if different than what is on RX)  Name:  [id number] - [place], [place] - [address]  Phone:  [phone]

## 2023-04-28 NOTE — TELEPHONE ENCOUNTER
Spoke with pharmacy and prior authorization is needed for medication. See 4/28/23. PA initiated to PA team.

## 2023-04-28 NOTE — TELEPHONE ENCOUNTER
General Call      Reason for Call:  Pt has been told by her pharmacy [facility] [address] that they never received the Rx for Lorazepam    Please resend    What are your questions or concerns:  n/a    Date of last appointment with provider: n/a    Could we send this information to you in [facility] [employer] or would you prefer to receive a phone call?:   Patient would prefer a phone call   Okay to leave a detailed message?: Yes at Other phone number:  [phone]

## 2023-05-03 NOTE — TELEPHONE ENCOUNTER
PA Initiation    Medication:   Insurance Company: [employer] - Phone [phone] Fax [phone]  Pharmacy Filling the Rx: [facility][id number] - [place], [place] - [address]  Filling Pharmacy Phone: [phone]  Filling Pharmacy Fax:    Start Date: 5/3/2023    Central Prior Authorization Team   Phone: [phone]

## 2023-05-04 NOTE — TELEPHONE ENCOUNTER
Prior Authorization Approval    Authorization Effective Date: 4/3/2023  Authorization Expiration Date: 5/4/2024  Medication: Lorazepam 0.5mg  Approved Dose/Quantity: 10  Reference #:     Insurance Company: [employer] - Phone [phone] Fax [phone]  Expected CoPay:       CoPay Card Available:      Foundation Assistance Needed:    Which Pharmacy is filling the prescription (Not needed for infusion/clinic administered): [id number] - [place], [place] - [address] [place] [address]  Pharmacy Notified: Yes  Patient Notified: Comment:  Pharmacy will notify patient.

## 2023-06-07 ENCOUNTER — MYC MEDICAL ADVICE (OUTPATIENT)
Dept: INTERNAL MEDICINE | Facility: CLINIC | Age: 68
End: 2023-06-07
Payer: COMMERCIAL

## 2023-06-07 DIAGNOSIS — J01.90 ACUTE SINUSITIS, RECURRENCE NOT SPECIFIED, UNSPECIFIED LOCATION: ICD-10-CM

## 2023-06-08 RX ORDER — FEXOFENADINE HCL 180 MG/1
180 TABLET ORAL DAILY
Qty: 90 TABLET | Refills: 3 | Status: SHIPPED | OUTPATIENT
Start: 2023-06-08

## 2023-06-08 NOTE — TELEPHONE ENCOUNTER
"Routing refill request to provider for review/approval because:  Age    Last Written Prescription Date:  4/19/2022  Last Fill Quantity: 90,  # refills: 3   Last office visit provider:  11/9/2022     Requested Prescriptions   Pending Prescriptions Disp Refills     fexofenadine (ALLEGRA) 180 MG tablet 90 tablet 3     Sig: Take 1 tablet (180 mg) by mouth daily       Antihistamines Protocol Failed - 6/8/2023  8:01 AM        Failed - Patient is 3-64 years of age     Apply weight-based dosing for peds patients age 3 - 12 years of age.    Forward request to provider for patients under the age of 3 or over the age of 64.          Passed - Recent (12 mo) or future (30 days) visit within the authorizing provider's specialty     Patient has had an office visit with the authorizing provider or a provider within the authorizing providers department within the previous 12 mos or has a future within next 30 days. See \"Patient Info\" tab in inbasket, or \"Choose Columns\" in Meds & Orders section of the refill encounter.              Passed - Medication is active on med list             Alvaro Hanson RN 06/08/23 8:01 AM  "

## 2023-06-23 ENCOUNTER — PATIENT OUTREACH (OUTPATIENT)
Dept: GERIATRIC MEDICINE | Facility: CLINIC | Age: 68
End: 2023-06-23
Payer: COMMERCIAL

## 2023-06-28 ENCOUNTER — PATIENT OUTREACH (OUTPATIENT)
Dept: CARE COORDINATION | Facility: CLINIC | Age: 68
End: 2023-06-28
Payer: COMMERCIAL

## 2023-06-28 ENCOUNTER — PATIENT OUTREACH (OUTPATIENT)
Dept: GERIATRIC MEDICINE | Facility: CLINIC | Age: 68
End: 2023-06-28
Payer: COMMERCIAL

## 2023-06-28 ASSESSMENT — ACTIVITIES OF DAILY LIVING (ADL): DEPENDENT_IADLS:: INDEPENDENT

## 2023-06-28 NOTE — PROGRESS NOTES
Member returned call, discussed HRA and completed HRA over phone on 6/27/23.    Yudi Plascencia RN PHN  [facility]  [phone]

## 2023-06-28 NOTE — PROGRESS NOTES
[facility] Care Coordination Contact    [facility] Home Visit Assessment     Home visit for Health Risk Assessment with Shantelle Taveras completed on June 27, 2023 via phone, patient preference     Type of residence:: Private home - stairs  Current living arrangement:: I live alone     Assessment completed with:: Patient    Current Care Plan  Member currently receiving the following home care services:     Member currently receiving the following community resources: None      Medication Review  Medication reconciliation completed in Epic: If no, please explain completed assessment over phone  Medication set-up & administration: Independent-does not set up.  Self-administers medications.  Medication Risk Assessment Medication (1 or more, place referral to MTM): N/A: No risk factors identified  MTM Referral Placed: No: No risk factors idenified    Mental/Behavioral Health   Depression Screening:   PHQ-2 Total Score (Adult) - Positive if 3 or more points; Administer PHQ-9 if positive: 0       Mental health DX:: No        Falls Assessment:   Fallen 2 or more times in the past year?: No   Any fall with injury in the past year?: No    ADL/IADL Dependencies:   Dependent ADLs:: Independent  Dependent IADLs:: Independent    [facility] Plan sponsored benefits: Shared information re: Silver Sneakers/gym memberships, ASA, Calcium +D.    PCA Assessment completed at visit: No     Elderly Waiver Eligibility: No-does not meet criteria    Care Plan & Recommendations: Patient will continue to live independently or with minimal support in the community. No new services or DMEs at this time.    See [facility] for detailed assessment information.    Follow-Up Plan: Member informed of future contact, plan to f/u with member with a 6 month telephone assessment.  Contact information shared with member and family, encouraged member to call with any questions or concerns at any time.    [facility] care continuum providers: Please see  Snapshot and Care Management Flowsheets for Specific details of care plan.    This CC note routed to PCP.    Yudi Plascencia RN N  [facility]  [phone]

## 2023-06-30 ENCOUNTER — PATIENT OUTREACH (OUTPATIENT)
Dept: GERIATRIC MEDICINE | Facility: CLINIC | Age: 68
End: 2023-06-30
Payer: COMMERCIAL

## 2023-06-30 NOTE — LETTER
June 30, 2023    Important Medica Information    SHANTELLE HAMMOND  [address] [place] [facility] [place] [address]  Your Care Plan  Dear Shantelle,  When we spoke recently, I promised to send you a Care Plan. The plan enclosed is a summary of our discussion. It includes the steps we agreed would help you meet your health goals. In addition, I can help you with:  Odbeibw-R-YktiWU  This program is available to members who need a ride to medical and dental visits. To schedule a ride, call [phone] or [phone] (toll free). TTY: 711. You can call Monday - Thursday 8 a.m. to 5 p.m. and Fridays 9 a.m. to 5 p.m.  One Pass  One Pass is your no-cost, complete, fitness solution for your mind and body. To learn more visit [employer]fitness or call One Pass, toll-free 1 [phone] (TTY: 712) 8 a.m. to 9 p.m. Monday-Friday.  Health Care Directive   This form helps you outline your health care wishes. You can request a form from me and I will answer any questions you have before you discuss it with your doctor.   Annual Physical  Take a key step on your path to good health and set up an annual physical at your clinic.  Questions?  Call me at [phone] Monday-Friday between 8am and 5pm.  TTY: 711. As we discussed, I plan to be in touch with you again in 6 months to follow up via phone.  Sincerely,    Yudi Plascencia RN, PHN  [phone]  [email or web address]    cc: member records

## 2023-06-30 NOTE — PROGRESS NOTES
[facility] Care Coordination Contact    Received after visit chart from care coordinator.  Completed following tasks: Mailed copy of care plan to client, Mailed copy of POC signature sheet for member to sign and return in SASE  and Mailed Safe Medication Disposal    and Provider Signature - No POC Shared:  Member indicates that they do not want their POC shared with any [facility] providers.     Corina Liu  Care Management Specialist  [facility]  [phone]

## 2023-07-12 ENCOUNTER — PATIENT OUTREACH (OUTPATIENT)
Dept: CARE COORDINATION | Facility: CLINIC | Age: 68
End: 2023-07-12
Payer: COMMERCIAL

## 2023-07-13 ENCOUNTER — OFFICE VISIT (OUTPATIENT)
Dept: INTERNAL MEDICINE | Facility: CLINIC | Age: 68
End: 2023-07-13
Payer: COMMERCIAL

## 2023-07-13 VITALS
BODY MASS INDEX: 18.03 KG/M2 | HEIGHT: 64 IN | WEIGHT: 105.6 LBS | TEMPERATURE: 97 F | RESPIRATION RATE: 12 BRPM | DIASTOLIC BLOOD PRESSURE: 82 MMHG | HEART RATE: 69 BPM | SYSTOLIC BLOOD PRESSURE: 138 MMHG | OXYGEN SATURATION: 97 %

## 2023-07-13 DIAGNOSIS — Z23 NEED FOR SHINGLES VACCINE: ICD-10-CM

## 2023-07-13 DIAGNOSIS — H93.19 TINNITUS, UNSPECIFIED LATERALITY: ICD-10-CM

## 2023-07-13 DIAGNOSIS — Z12.31 ENCOUNTER FOR SCREENING MAMMOGRAM FOR BREAST CANCER: ICD-10-CM

## 2023-07-13 DIAGNOSIS — Z87.891 PERSONAL HISTORY OF SMOKING: ICD-10-CM

## 2023-07-13 DIAGNOSIS — M81.0 AGE-RELATED OSTEOPOROSIS WITHOUT CURRENT PATHOLOGICAL FRACTURE: ICD-10-CM

## 2023-07-13 DIAGNOSIS — B18.2 CHRONIC HEPATITIS C WITHOUT HEPATIC COMA (H): Primary | ICD-10-CM

## 2023-07-13 DIAGNOSIS — E78.2 MIXED HYPERLIPIDEMIA: ICD-10-CM

## 2023-07-13 DIAGNOSIS — M54.50 BILATERAL LOW BACK PAIN WITHOUT SCIATICA, UNSPECIFIED CHRONICITY: ICD-10-CM

## 2023-07-13 DIAGNOSIS — Z00.00 HEALTH MAINTENANCE EXAMINATION: ICD-10-CM

## 2023-07-13 LAB
ALBUMIN SERPL BCG-MCNC: 4.6 G/DL (ref 3.5–5.2)
ALP SERPL-CCNC: 79 U/L (ref 35–104)
ALT SERPL W P-5'-P-CCNC: 25 U/L (ref 0–50)
ANION GAP SERPL CALCULATED.3IONS-SCNC: 12 MMOL/L (ref 7–15)
AST SERPL W P-5'-P-CCNC: 41 U/L (ref 0–45)
BILIRUB SERPL-MCNC: 0.6 MG/DL
BUN SERPL-MCNC: 8 MG/DL (ref 8–23)
CALCIUM SERPL-MCNC: 10 MG/DL (ref 8.8–10.2)
CHLORIDE SERPL-SCNC: 101 MMOL/L (ref 98–107)
CHOLEST SERPL-MCNC: 208 MG/DL
CREAT SERPL-MCNC: 0.78 MG/DL (ref 0.51–0.95)
DEPRECATED HCO3 PLAS-SCNC: 28 MMOL/L (ref 22–29)
ERYTHROCYTE [DISTWIDTH] IN BLOOD BY AUTOMATED COUNT: 13.4 % (ref 10–15)
GFR SERPL CREATININE-BSD FRML MDRD: 81 ML/MIN/1.73M2
GLUCOSE SERPL-MCNC: 83 MG/DL (ref 70–99)
HCT VFR BLD AUTO: 47.8 % (ref 35–47)
HCV AB SERPL QL IA: REACTIVE
HDLC SERPL-MCNC: 106 MG/DL
HGB BLD-MCNC: 16.2 G/DL (ref 11.7–15.7)
LDLC SERPL CALC-MCNC: 90 MG/DL
MCH RBC QN AUTO: 33.9 PG (ref 26.5–33)
MCHC RBC AUTO-ENTMCNC: 33.9 G/DL (ref 31.5–36.5)
MCV RBC AUTO: 100 FL (ref 78–100)
NONHDLC SERPL-MCNC: 102 MG/DL
PLATELET # BLD AUTO: 179 10E3/UL (ref 150–450)
POTASSIUM SERPL-SCNC: 4.4 MMOL/L (ref 3.4–5.3)
PROT SERPL-MCNC: 8.5 G/DL (ref 6.4–8.3)
RBC # BLD AUTO: 4.78 10E6/UL (ref 3.8–5.2)
SODIUM SERPL-SCNC: 141 MMOL/L (ref 136–145)
TRIGL SERPL-MCNC: 58 MG/DL
TSH SERPL DL<=0.005 MIU/L-ACNC: 0.67 UIU/ML (ref 0.3–4.2)
WBC # BLD AUTO: 8.6 10E3/UL (ref 4–11)

## 2023-07-13 PROCEDURE — 36415 COLL VENOUS BLD VENIPUNCTURE: CPT | Performed by: INTERNAL MEDICINE

## 2023-07-13 PROCEDURE — G0439 PPPS, SUBSEQ VISIT: HCPCS | Performed by: INTERNAL MEDICINE

## 2023-07-13 PROCEDURE — 80053 COMPREHEN METABOLIC PANEL: CPT | Performed by: INTERNAL MEDICINE

## 2023-07-13 PROCEDURE — 86803 HEPATITIS C AB TEST: CPT | Performed by: INTERNAL MEDICINE

## 2023-07-13 PROCEDURE — 91312 COVID-19 BIVALENT 12+ (PFIZER): CPT | Performed by: INTERNAL MEDICINE

## 2023-07-13 PROCEDURE — 99000 SPECIMEN HANDLING OFFICE-LAB: CPT | Performed by: INTERNAL MEDICINE

## 2023-07-13 PROCEDURE — 85027 COMPLETE CBC AUTOMATED: CPT | Performed by: INTERNAL MEDICINE

## 2023-07-13 PROCEDURE — 87902 NFCT AGT GNTYP ALYS HEP C: CPT | Mod: 90 | Performed by: INTERNAL MEDICINE

## 2023-07-13 PROCEDURE — 99214 OFFICE O/P EST MOD 30 MIN: CPT | Mod: 25 | Performed by: INTERNAL MEDICINE

## 2023-07-13 PROCEDURE — 80061 LIPID PANEL: CPT | Performed by: INTERNAL MEDICINE

## 2023-07-13 PROCEDURE — 84443 ASSAY THYROID STIM HORMONE: CPT | Performed by: INTERNAL MEDICINE

## 2023-07-13 PROCEDURE — 87522 HEPATITIS C REVRS TRNSCRPJ: CPT | Performed by: INTERNAL MEDICINE

## 2023-07-13 PROCEDURE — 0124A COVID-19 BIVALENT 12+ (PFIZER): CPT | Performed by: INTERNAL MEDICINE

## 2023-07-13 RX ORDER — CYCLOBENZAPRINE HCL 10 MG
10 TABLET ORAL 3 TIMES DAILY PRN
Qty: 90 TABLET | Refills: 1 | Status: SHIPPED | OUTPATIENT
Start: 2023-07-13 | End: 2024-07-16

## 2023-07-13 RX ORDER — ALENDRONATE SODIUM 70 MG/1
70 TABLET ORAL
Qty: 12 TABLET | Refills: 3 | Status: SHIPPED | OUTPATIENT
Start: 2023-07-13 | End: 2024-08-29

## 2023-07-13 ASSESSMENT — ENCOUNTER SYMPTOMS
WEAKNESS: 0
HEMATURIA: 0
JOINT SWELLING: 0
HEARTBURN: 0
CONSTIPATION: 0
FEVER: 0
COUGH: 0
NERVOUS/ANXIOUS: 0
ABDOMINAL PAIN: 0
PARESTHESIAS: 0
ARTHRALGIAS: 0
SHORTNESS OF BREATH: 0
HEMATOCHEZIA: 0
DIARRHEA: 0
PALPITATIONS: 0
BREAST MASS: 0
DIZZINESS: 0
NAUSEA: 0
DYSURIA: 0
SORE THROAT: 0
HEADACHES: 1
MYALGIAS: 1
CHILLS: 0
EYE PAIN: 0
FREQUENCY: 0

## 2023-07-13 ASSESSMENT — PAIN SCALES - GENERAL: PAINLEVEL: NO PAIN (0)

## 2023-07-13 ASSESSMENT — ACTIVITIES OF DAILY LIVING (ADL): CURRENT_FUNCTION: NO ASSISTANCE NEEDED

## 2023-07-13 NOTE — PROGRESS NOTES
"SUBJECTIVE:   Shantelle is a 68 year old who presents for Preventive Visit.      7/13/2023    10:09 AM   Additional Questions   Roomed by jennifer leal   oxybutinin did not work for her that her urgency she does not want to use it  Stopped drinking alcohol but , still a cigarreete smoking one pack a day   Are you in the first 12 months of your Medicare coverage?  No    Healthy Habits:     In general, how would you rate your overall health?  Excellent    Frequency of exercise:  6-7 days/week    Duration of exercise:  Greater than 60 minutes    Do you usually eat at least 4 servings of fruit and vegetables a day, include whole grains    & fiber and avoid regularly eating high fat or \"junk\" foods?  Yes    Taking medications regularly:  No    Barriers to taking medications:  Problems remembering to take them    Medication side effects:  Not applicable    Ability to successfully perform activities of daily living:  No assistance needed    Home Safety:  No safety concerns identified    Hearing Impairment:  No hearing concerns    In the past 6 months, have you been bothered by leaking of urine?  No    In general, how would you rate your overall mental or emotional health?  Excellent    Additional concerns today:  No        Have you ever done Advance Care Planning? (For example, a Health Directive, POLST, or a discussion with a medical provider or your loved ones about your wishes): No, advance care planning information given to patient to review.  Patient declined advance care planning discussion at this time.       Fall risk  Fallen 2 or more times in the past year?: No  Any fall with injury in the past year?: No    Cognitive Screening   1) Repeat 3 items (Leader, Season, Table)    2) Clock draw: NORMAL  3) 3 item recall: Recalls 1 object   Results: NORMAL clock, 1-2 items recalled: COGNITIVE IMPAIRMENT LESS LIKELY    Mini-CogTM Copyright S Manuelito. Licensed by the author for use in [facility]; reprinted with " permission (ynes@[facility]). All rights reserved.      Do you have sleep apnea, excessive snoring or daytime drowsiness?: no    Reviewed and updated as needed this visit by clinical staff   Tobacco  Allergies  Meds              Reviewed and updated as needed this visit by Provider                 Social History     Tobacco Use     Smoking status: Every Day     Packs/day: 0.50     Years: 50.00     Pack years: 25.00     Types: Cigarettes     Smokeless tobacco: Never   Substance Use Topics     Alcohol use: Yes     Comment: social             7/13/2023    10:08 AM   Alcohol Use   Prescreen: >3 drinks/day or >7 drinks/week? No     Do you have a current opioid prescription? No  Do you use any other controlled substances or medications that are not prescribed by a provider? None              Current providers sharing in care for this patient include:   Patient Care Team:  Brigida Godinez MD as PCP - General  Brigida Godinez MD as Assigned PCP  Yudi Plascencia RN as Lead Care Coordinator (Primary Care - CC)    The following health maintenance items are reviewed in Epic and correct as of today:  Health Maintenance   Topic Date Due     SPIROMETRY  Never done     COPD ACTION PLAN  Never done     ZOSTER IMMUNIZATION (1 of 2) Never done     LUNG CANCER SCREENING  Never done     HEPATITIS B IMMUNIZATION (2 of 3 - Risk 3-dose series) 03/25/2021     COVID-19 Vaccine (6 - Pfizer series) 03/09/2023     NICOTINE/TOBACCO CESSATION COUNSELING Q 1 YR  07/28/2023     MEDICARE ANNUAL WELLNESS VISIT  07/28/2023     ANNUAL REVIEW OF HM ORDERS  07/28/2023     INFLUENZA VACCINE (1) 09/01/2023     MAMMO SCREENING  02/02/2024     FALL RISK ASSESSMENT  07/13/2024     DTAP/TDAP/TD IMMUNIZATION (3 - Td or Tdap) 11/13/2025     LIPID  01/19/2027     ADVANCE CARE PLANNING  08/01/2027     COLORECTAL CANCER SCREENING  12/29/2030     DEXA  01/21/2036     PHQ-2 (once per calendar year)  Completed     Pneumococcal Vaccine: 65+ Years  Completed     IPV  "IMMUNIZATION  Aged Out     MENINGITIS IMMUNIZATION  Aged Out               Review of Systems   Constitutional: Negative for chills and fever.   HENT: Negative for congestion, ear pain, hearing loss and sore throat.    Eyes: Positive for visual disturbance. Negative for pain.   Respiratory: Negative for cough and shortness of breath.    Cardiovascular: Negative for chest pain, palpitations and peripheral edema.   Gastrointestinal: Negative for abdominal pain, constipation, diarrhea, heartburn, hematochezia and nausea.   Breasts:  Negative for tenderness, breast mass and discharge.   Genitourinary: Positive for genital sores. Negative for dysuria, frequency, hematuria, pelvic pain, urgency, vaginal bleeding and vaginal discharge.   Musculoskeletal: Positive for myalgias. Negative for arthralgias and joint swelling.   Skin: Negative for rash.   Neurological: Positive for headaches. Negative for dizziness, weakness and paresthesias.   Psychiatric/Behavioral: Positive for mood changes. The patient is not nervous/anxious.      Current Outpatient Medications   Medication     alendronate (FOSAMAX) 70 MG tablet     cyclobenzaprine (FLEXERIL) 10 MG tablet     fexofenadine (ALLEGRA) 180 MG tablet     nicotine (NICOTROL) 10 MG inhaler     No current facility-administered medications for this visit.         OBJECTIVE:   /82 (BP Location: Left arm, Patient Position: Sitting, Cuff Size: Adult Small)   Pulse 69   Temp 97  F (36.1  C)   Resp 12   Ht 1.626 m (5' 4\")   Wt 47.9 kg (105 lb 9.6 oz)   SpO2 97%   BMI 18.13 kg/m   Estimated body mass index is 18.13 kg/m  as calculated from the following:    Height as of this encounter: 1.626 m (5' 4\").    Weight as of this encounter: 47.9 kg (105 lb 9.6 oz).  Physical Exam  GENERAL: healthy, alert and no distress  EYES: Eyes grossly normal to inspection, PERRL and conjunctivae and sclerae normal  HENT: ear canals and TM's normal, nose and mouth without ulcers or lesions  NECK: " no adenopathy, no asymmetry, masses, or scars and thyroid normal to palpation  RESP: lungs clear to auscultation - no rales, rhonchi or wheezes  BREAST: normal without masses, tenderness or nipple discharge and no palpable axillary masses or adenopathy  CV: regular rate and rhythm, normal S1 S2, no S3 or S4, no murmur, click or rub, no peripheral edema and peripheral pulses strong  ABDOMEN: soft, nontender, no hepatosplenomegaly, no masses and bowel sounds normal  MS: no gross musculoskeletal defects noted, no edema  SKIN: no suspicious lesions or rashes  NEURO: Normal strength and tone, mentation intact and speech normal  PSYCH: mentation appears normal, affect normal/bright        ASSESSMENT / PLAN:   (B18.2) Chronic hepatitis C without hepatic coma (H)  (primary encounter diagnosis)  Comment: She says that she went and saw a hepatologist and the havroni was not covered by insurance.  I will have to review records.  And check herhep c  RNA quant and hepatic  panel and try and see if we can get the med management help.  My suspicion is that she was not given it because she still drinking at the time.  And they may be misunderstanding. She will need annual US liver   Plan: Hepatitis C Screen Reflex to HCV RNA Quant and         Genotype, Comprehensive metabolic panel, US         Abdomen Limited, Hepatitis C RNA, Quantitative         by PCR with Confirmatory Reflex to Genotyping,         Hepatitis C High Resolution Genotype            (Z23) Need for shingles vaccine  Comment:   Plan:     (Z00.00) Health maintenance examination  Comment:   Plan:     (H93.19) Tinnitus, unspecified laterality  Comment:   Plan:     (Z87.891) Personal history of smoking  Comment:   Plan: nicotine (NICOTROL) 10 MG inhaler, CT Chest         Lung Cancer Scrn Low Dose wo        She is agreeable to lung cancer screening counseled on quitting smoking because of her dentures do not use the gum.  She does not use the patch trial of  inhaler    (Z12.31) Encounter for screening mammogram for breast cancer  Comment: Urged her to continue doing mammogram screening  Plan: *MA Screening Digital Bilateral            (E78.2) Mixed hyperlipidemia  Comment:   Plan: Lipid panel reflex to direct LDL Fasting, CBC         with platelets, TSH            (M81.0) Age-related osteoporosis without current pathological fracture  Comment: Urged her to take Fosamax for her diagnosis of osteoporosis  Plan: alendronate (FOSAMAX) 70 MG tablet            (M54.50) Bilateral low back pain without sciatica, unspecified chronicity  Comment:   Plan: cyclobenzaprine (FLEXERIL) 10 MG tablet        Flexeril can be used as needed for back pain.  Narcotics and advised          COUNSELING:  Reviewed preventive health counseling, as reflected in patient instructions        She reports that she has been smoking cigarettes. She has a 25.00 pack-year smoking history. She has never used smokeless tobacco.  Nicotine/Tobacco Cessation Plan:   Pharmacotherapies : Nicotine inhaler      Appropriate preventive services were discussed with this patient, including applicable screening as appropriate for cardiovascular disease, diabetes, osteopenia/osteoporosis, and glaucoma.  As appropriate for age/gender, discussed screening for colorectal cancer, prostate cancer, breast cancer, and cervical cancer. Checklist reviewing preventive services available has been given to the patient.    Reviewed patients plan of care and provided an AVS. The Basic Care Plan (routine screening as documented in Health Maintenance) for Shantelle meets the Care Plan requirement. This Care Plan has been established and reviewed with the Patient.          Brigida Godinez MD  [facility]    Identified Health Risks:    I have reviewed Opioid Use Disorder and Substance Use Disorder risk factors and made any needed referrals.    :[id number]}

## 2023-07-13 NOTE — PATIENT INSTRUCTIONS
mammo annual   dexa 2021 osteoporosis   Colon 2020 normal next ten years   Annual lung cancer     Take fosamax once a week with a big glass of water stay upright and dont eat till an hour

## 2023-07-14 ENCOUNTER — ANCILLARY PROCEDURE (OUTPATIENT)
Dept: MAMMOGRAPHY | Facility: CLINIC | Age: 68
End: 2023-07-14
Attending: INTERNAL MEDICINE
Payer: COMMERCIAL

## 2023-07-14 DIAGNOSIS — Z12.31 ENCOUNTER FOR SCREENING MAMMOGRAM FOR BREAST CANCER: ICD-10-CM

## 2023-07-14 PROCEDURE — 77067 SCR MAMMO BI INCL CAD: CPT | Mod: TC | Performed by: RADIOLOGY

## 2023-07-15 LAB
HCV RNA SERPL NAA+PROBE-ACNC: ABNORMAL IU/ML
HCV RNA SERPL NAA+PROBE-LOG IU: 6.4 {LOG_IU}/ML

## 2023-07-17 DIAGNOSIS — B19.20 HEPATITIS C VIRUS INFECTION, UNSPECIFIED CHRONICITY: Primary | ICD-10-CM

## 2023-07-22 LAB — HCV GENTYP SERPL NAA+PROBE: NORMAL

## 2023-07-25 ENCOUNTER — HOSPITAL ENCOUNTER (OUTPATIENT)
Dept: CT IMAGING | Facility: HOSPITAL | Age: 68
Discharge: HOME | End: 2023-07-25
Attending: INTERNAL MEDICINE
Payer: COMMERCIAL

## 2023-07-25 ENCOUNTER — HOSPITAL ENCOUNTER (OUTPATIENT)
Dept: ULTRASOUND IMAGING | Facility: HOSPITAL | Age: 68
Discharge: HOME | End: 2023-07-25
Attending: INTERNAL MEDICINE
Payer: COMMERCIAL

## 2023-07-25 DIAGNOSIS — Z87.891 PERSONAL HISTORY OF SMOKING: ICD-10-CM

## 2023-07-25 DIAGNOSIS — B18.2 CHRONIC HEPATITIS C WITHOUT HEPATIC COMA (H): ICD-10-CM

## 2023-07-25 PROCEDURE — 76705 ECHO EXAM OF ABDOMEN: CPT

## 2023-07-25 PROCEDURE — 71271 CT THORAX LUNG CANCER SCR C-: CPT

## 2023-08-04 NOTE — CONFIDENTIAL NOTE
DIAGNOSIS: Hepatitis C virus infection, unspecified chronicity   Appt Date:   10.02.2023    NOTES STATUS DETAILS   OFFICE NOTE from referring provider Internal 07.17.2023 Brigida Godinez MD    OFFICE NOTES from other specialists     DISCHARGE SUMMARY from hospital     MEDICATION LIST Internal    LIVER BIOSPY (IF APPLICABLE)      PATHOLOGY REPORTS      IMAGING     ENDOSCOPY (IF AVAILABLE)     COLONOSCOPY (IF AVAILABLE)     ULTRASOUND LIVER Internal 07.25.2023 US ABDOMEN LIMITED    CT OF ABDOMEN     MRI OF LIVER     FIBROSCAN, US ELASTOGRAPHY, FIBROSIS SCAN, MR ELASTOGRAPHY     LABS     HEPATIC PANEL (LIVER PANEL) Internal 07.28.2022   BASIC METABOLIC PANEL Internal 07.28.2022   COMPLETE METABOLIC PANEL Internal 07.13.2023   COMPLETE BLOOD COUNT (CBC) Internal 07.13.2023   INTERNATIONAL NORMALIZED RATIO (INR)     HEPATITIS C ANTIBODY Internal 07.13.2023   HEPATITIS C VIRAL LOAD/PCR     HEPATITIS C GENOTYPE     HEPATITIS B SURFACE ANTIGEN Internal 07.28.2022   HEPATITIS B SURFACE ANTIBODY     HEPATITIS B DNA QUANT LEVEL     HEPATITIS B CORE ANTIBODY

## 2023-08-06 ENCOUNTER — MYC MEDICAL ADVICE (OUTPATIENT)
Dept: INTERNAL MEDICINE | Facility: CLINIC | Age: 68
End: 2023-08-06
Payer: COMMERCIAL

## 2023-08-06 DIAGNOSIS — I25.10 ASCVD (ARTERIOSCLEROTIC CARDIOVASCULAR DISEASE): Primary | ICD-10-CM

## 2023-08-07 RX ORDER — ATORVASTATIN CALCIUM 10 MG/1
10 TABLET, FILM COATED ORAL DAILY
Qty: 90 TABLET | Refills: 3 | Status: SHIPPED | OUTPATIENT
Start: 2023-08-07

## 2023-10-02 ENCOUNTER — OFFICE VISIT (OUTPATIENT)
Dept: GASTROENTEROLOGY | Facility: CLINIC | Age: 68
End: 2023-10-02
Attending: INTERNAL MEDICINE
Payer: COMMERCIAL

## 2023-10-02 ENCOUNTER — PRE VISIT (OUTPATIENT)
Dept: GASTROENTEROLOGY | Facility: CLINIC | Age: 68
End: 2023-10-02
Payer: COMMERCIAL

## 2023-10-02 VITALS
SYSTOLIC BLOOD PRESSURE: 188 MMHG | OXYGEN SATURATION: 98 % | DIASTOLIC BLOOD PRESSURE: 99 MMHG | HEART RATE: 75 BPM | BODY MASS INDEX: 18.3 KG/M2 | WEIGHT: 106.6 LBS | TEMPERATURE: 97.7 F

## 2023-10-02 DIAGNOSIS — B19.20 HEPATITIS C VIRUS INFECTION, UNSPECIFIED CHRONICITY: ICD-10-CM

## 2023-10-02 PROBLEM — J06.9 UPPER RESPIRATORY INFECTION: Status: RESOLVED | Noted: 2022-07-28 | Resolved: 2023-10-02

## 2023-10-02 PROBLEM — H61.20 CERUMEN IMPACTION: Status: RESOLVED | Noted: 2022-07-28 | Resolved: 2023-10-02

## 2023-10-02 PROBLEM — R07.9 CHEST PAIN: Status: RESOLVED | Noted: 2022-07-28 | Resolved: 2023-10-02

## 2023-10-02 PROBLEM — J01.90 ACUTE SINUSITIS: Status: RESOLVED | Noted: 2022-07-28 | Resolved: 2023-10-02

## 2023-10-02 PROBLEM — M79.609 LIMB PAIN: Status: RESOLVED | Noted: 2022-07-28 | Resolved: 2023-10-02

## 2023-10-02 PROBLEM — J18.9 PNEUMONIA: Status: RESOLVED | Noted: 2022-07-28 | Resolved: 2023-10-02

## 2023-10-02 PROCEDURE — 99204 OFFICE O/P NEW MOD 45 MIN: CPT | Mod: GC | Performed by: INTERNAL MEDICINE

## 2023-10-02 PROCEDURE — G0463 HOSPITAL OUTPT CLINIC VISIT: HCPCS | Performed by: INTERNAL MEDICINE

## 2023-10-02 ASSESSMENT — PAIN SCALES - GENERAL: PAINLEVEL: NO PAIN (0)

## 2023-10-02 NOTE — NURSING NOTE
Chief Complaint   Patient presents with    Consult   BP (!) 201/99 (BP Location: Right arm, Patient Position: Sitting, Cuff Size: Adult Small)   Pulse 75   Temp 97.7  F (36.5  C) (Oral)   Wt 48.4 kg (106 lb 9.6 oz)   SpO2 98%   BMI 18.30 kg/m  Inge Gomez on 10/2/2023 at 9:41 AM

## 2023-10-02 NOTE — PROGRESS NOTES
"[facility] Hepatology    New Patient Visit    Referring provider:  Brigida Godinez    68 year old female w/ hx of active Hep C, HLD, and tobacco use presenting today for Hep C evaluation and treatment    Chief complaint: \"I need a lab test for my liver\"      HPI:  HCV  - dx ?2014  - ?needlestick injury  - GT-3  - no prior liver bx or FibroScan  - no prior antiviral therapy    Told she had Hep C 3 years ago by her PCP. 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 Believes this is how she contracted Hep C.     On chart review, it appears pt has had positive Hep C antibody since at least Oct 2014. Per outside records, pt had high viral load and fibroscan with evidence of some fibrosis but unable to see fibroscan records. Notes say she saw outside hepatologist and was prescribed Harvoni and/or Mavyret but these were not covered by her insurance so she never started any treatment. Based on labs from 7/13: normal LFTs, alk phos, BR, reactive HCV antibody, > 2 million viral load, 3a genotype, Hep B surface antigen negative 2022. LFTs wnl, alk phos wnl, plt 179. Had Abd US in July 2023 that showed normal liver texture w/o lesions.     Reports only drug use is marijuana. Denies any cocaine use or IV drug use. No tattoos. Smokes 0.5 ppd and is trying to quit. Drinks alcohol socially on weekends and tries to stick to no more than 2 drinks per day. Is a retired PCA. Doesn't doctor frequently and does not like taking medications. Only consistent medication she takes is atorvastatin. Has known elevated blood pressures when at the clinic but on latest PCP visit BP was 138/82 so she was not started on any antihypertensives. Believes her elevated blood pressure readings today are due to stress of getting to clinic on time and her caffeine intake on an empty stomach (drinks 1-2 caffeinated beverages a day).     Patient denies any abdominal pain, distention, nausea, vomiting, " diarrhea, constipation.    Patient denies jaundice, lower extremity edema, lethargy or confusion.    No history of melena, hematemesis or hematochezia.    Patient denies fevers, sweats, chills or weight loss.    Medical hx Surgical hx   No past medical history on file.   Past Surgical History:   Procedure Laterality Date    BIOPSY BREAST Right 2002?    HYSTERECTOMY      OOPHORECTOMY      ZZC TOTAL ABDOM HYSTERECTOMY      Description: Total Abdominal Hysterectomy;  Recorded: 01/31/2011;  Comments: 2003          Medications  Current Outpatient Medications   Medication Sig Dispense Refill    alendronate (FOSAMAX) 70 MG tablet Take 1 tablet (70 mg) by mouth every 7 days 12 tablet 3    atorvastatin (LIPITOR) 10 MG tablet Take 1 tablet (10 mg) by mouth daily 90 tablet 3    cyclobenzaprine (FLEXERIL) 10 MG tablet Take 1 tablet (10 mg) by mouth 3 times daily as needed for muscle spasms 90 tablet 1    fexofenadine (ALLEGRA) 180 MG tablet Take 1 tablet (180 mg) by mouth daily 90 tablet 3    nicotine (NICOTROL) 10 MG inhaler Use 1 cartridge as needed for urge to smoke by puffing over course of 20min.  Use 6-16 cart/day; reduce number of cart/day over 6-12 weeks. 100 each 3       Allergies  Allergies   Allergen Reactions    Codeine Unknown       Family hx Social hx   Family History   Problem Relation Age of Onset    Cancer Sister       Social History     Tobacco Use    Smoking status: Every Day     Packs/day: 0.50     Years: 50.00     Pack years: 25.00     Types: Cigarettes    Smokeless tobacco: Never   Vaping Use    Vaping Use: Never used   Substance Use Topics    Alcohol use: Yes     Comment: social    Drug use: Never          Review of systems  A 10-point review of systems was negative.    Examination  BP (!) 188/99 (BP Location: Right arm, Patient Position: Sitting, Cuff Size: Adult Small)   Pulse 75   Temp 97.7  F (36.5  C) (Oral)   Wt 48.4 kg (106 lb 9.6 oz)   SpO2 98%   BMI 18.30 kg/m    Body mass index is 18.3  kg/m .    Gen- well, NAD, A+Ox3, normal color  Eye- EOMI  ENT- MMM  CVS- regular rate   RS- no respiratory distress   Abd- non surgical, non distended, soft, non tender, no hepatomegaly/splenomegaly   Extr- no LI  MS- hands normal- no clubbing, palmar erythema, dupytren's   Neuro- A+Ox3, no asterixis  Skin- no rash or jaundice  Psych- normal mood    Laboratory  Lab Results   Component Value Date     07/13/2023    POTASSIUM 4.4 07/13/2023    POTASSIUM 3.9 01/19/2022    CHLORIDE 101 07/13/2023    CHLORIDE 103 01/19/2022    CO2 28 07/13/2023    CO2 27 01/19/2022    BUN 8.0 07/13/2023    BUN 11 01/19/2022    CR 0.78 07/13/2023       Lab Results   Component Value Date    BILITOTAL 0.6 07/13/2023    ALT 25 07/13/2023    AST 41 07/13/2023    ALKPHOS 79 07/13/2023       Lab Results   Component Value Date    ALBUMIN 4.6 07/13/2023    ALBUMIN 3.9 01/19/2022    PROTTOTAL 8.5 07/13/2023        Lab Results   Component Value Date    WBC 8.6 07/13/2023    HGB 16.2 07/13/2023     07/13/2023     07/13/2023     labs from 7/13: reactive HCV antibody, > 2 million viral load, 3a genotype, Hep B surface antigen negative 2022. LFTs wnl, alk phos wnl, plt 179    No results found for: INR      Radiology  Abd U/S Jul 2023 personally reviewed- no liver mass or ascites    Assessment  68 year old female with untreated chronic Hep C genotype 3a presenting for evaluation of treatment.  No concurrent Hep B or HIV infection. No obvious fibrosis or cirrhosis seen on recent abdominal US but no record of Fibroscan in our system. Based on pt's active viral load, would recommend treatment. Discussed course, risks, and benefits of Hep C treatment with the expectation that this would cure her Hep C. Discussed that we would need to check a fibroscan for insurance reasons prior to starting treatment and with this, I anticipate there should be no insurance issues with getting her coverage for a Hep C medication (Mavyret vs Harvoni). Pt  says that she would like to hold off on any further testing as her abdominal ultrasound did not show any fibrosis and she currently feels well. She is very adverse to taking any medications for fear they would make her feel unwell. Pt says she understands the risks/benefits of treatment, but would prefer to hold off on any further workup or treatment of her hepatitis C at this time.     Plan  - avoid all alcohol in setting of chronic Hep C   - call or return to clinic at any time if she would elect to start treatment, we would be happy to see her   - follow up as needed     Staffed w/ MD Dayami White DO  Internal Medicine, PGY-II    Alvaro Almeida MD  Hepatology  [facility]

## 2023-10-02 NOTE — LETTER
"    10/2/2023         RE: Shantelle Taveras  [address]  [place] [address]        Dear Colleague,    Thank you for referring your patient, Shantelle Taveras, to the [facility] [place]. Please see a copy of my visit note below.    [facility] Hepatology    New Patient Visit    Referring provider:  Brigida Godinez    68 year old female w/ hx of active Hep C, HLD, and tobacco use presenting today for Hep C evaluation and treatment    Chief complaint: \"I need a lab test for my liver\"      HPI:  HCV  - dx ?2014  - ?needlestick injury  - GT-3  - no prior liver bx or FibroScan  - no prior antiviral therapy    Told she had Hep C 3 years ago by her PCP. 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 Believes this is how she contracted Hep C.     On chart review, it appears pt has had positive Hep C antibody since at least Oct 2014. Per outside records, pt had high viral load and fibroscan with evidence of some fibrosis but unable to see fibroscan records. Notes say she saw outside hepatologist and was prescribed Harvoni and/or Mavyret but these were not covered by her insurance so she never started any treatment. Based on labs from 7/13: normal LFTs, alk phos, BR, reactive HCV antibody, > 2 million viral load, 3a genotype, Hep B surface antigen negative 2022. LFTs wnl, alk phos wnl, plt 179. Had Abd US in July 2023 that showed normal liver texture w/o lesions.     Reports only drug use is marijuana. Denies any cocaine use or IV drug use. No tattoos. Smokes 0.5 ppd and is trying to quit. Drinks alcohol socially on weekends and tries to stick to no more than 2 drinks per day. Is a retired PCA. Doesn't doctor frequently and does not like taking medications. Only consistent medication she takes is atorvastatin. Has known elevated blood pressures when at the clinic but on latest PCP visit BP was 138/82 so she was not started on any " antihypertensives. Believes her elevated blood pressure readings today are due to stress of getting to clinic on time and her caffeine intake on an empty stomach (drinks 1-2 caffeinated beverages a day).     Patient denies any abdominal pain, distention, nausea, vomiting, diarrhea, constipation.    Patient denies jaundice, lower extremity edema, lethargy or confusion.    No history of melena, hematemesis or hematochezia.    Patient denies fevers, sweats, chills or weight loss.    Medical hx Surgical hx   No past medical history on file.   Past Surgical History:   Procedure Laterality Date    BIOPSY BREAST Right 2002?    HYSTERECTOMY      OOPHORECTOMY      ZZC TOTAL ABDOM HYSTERECTOMY      Description: Total Abdominal Hysterectomy;  Recorded: 01/31/2011;  Comments: 2003          Medications  Current Outpatient Medications   Medication Sig Dispense Refill    alendronate (FOSAMAX) 70 MG tablet Take 1 tablet (70 mg) by mouth every 7 days 12 tablet 3    atorvastatin (LIPITOR) 10 MG tablet Take 1 tablet (10 mg) by mouth daily 90 tablet 3    cyclobenzaprine (FLEXERIL) 10 MG tablet Take 1 tablet (10 mg) by mouth 3 times daily as needed for muscle spasms 90 tablet 1    fexofenadine (ALLEGRA) 180 MG tablet Take 1 tablet (180 mg) by mouth daily 90 tablet 3    nicotine (NICOTROL) 10 MG inhaler Use 1 cartridge as needed for urge to smoke by puffing over course of 20min.  Use 6-16 cart/day; reduce number of cart/day over 6-12 weeks. 100 each 3       Allergies  Allergies   Allergen Reactions    Codeine Unknown       Family hx Social hx   Family History   Problem Relation Age of Onset    Cancer Sister       Social History     Tobacco Use    Smoking status: Every Day     Packs/day: 0.50     Years: 50.00     Pack years: 25.00     Types: Cigarettes    Smokeless tobacco: Never   Vaping Use    Vaping Use: Never used   Substance Use Topics    Alcohol use: Yes     Comment: social    Drug use: Never          Review of systems  A 10-point  review of systems was negative.    Examination  BP (!) 188/99 (BP Location: Right arm, Patient Position: Sitting, Cuff Size: Adult Small)   Pulse 75   Temp 97.7  F (36.5  C) (Oral)   Wt 48.4 kg (106 lb 9.6 oz)   SpO2 98%   BMI 18.30 kg/m    Body mass index is 18.3 kg/m .    Gen- well, NAD, A+Ox3, normal color  Eye- EOMI  ENT- MMM  CVS- regular rate   RS- no respiratory distress   Abd- non surgical, non distended, soft, non tender, no hepatomegaly/splenomegaly   Extr- no LI  MS- hands normal- no clubbing, palmar erythema, dupytren's   Neuro- A+Ox3, no asterixis  Skin- no rash or jaundice  Psych- normal mood    Laboratory  Lab Results   Component Value Date     07/13/2023    POTASSIUM 4.4 07/13/2023    POTASSIUM 3.9 01/19/2022    CHLORIDE 101 07/13/2023    CHLORIDE 103 01/19/2022    CO2 28 07/13/2023    CO2 27 01/19/2022    BUN 8.0 07/13/2023    BUN 11 01/19/2022    CR 0.78 07/13/2023       Lab Results   Component Value Date    BILITOTAL 0.6 07/13/2023    ALT 25 07/13/2023    AST 41 07/13/2023    ALKPHOS 79 07/13/2023       Lab Results   Component Value Date    ALBUMIN 4.6 07/13/2023    ALBUMIN 3.9 01/19/2022    PROTTOTAL 8.5 07/13/2023        Lab Results   Component Value Date    WBC 8.6 07/13/2023    HGB 16.2 07/13/2023     07/13/2023     07/13/2023     labs from 7/13: reactive HCV antibody, > 2 million viral load, 3a genotype, Hep B surface antigen negative 2022. LFTs wnl, alk phos wnl, plt 179    No results found for: INR      Radiology  Abd U/S Jul 2023 personally reviewed- no liver mass or ascites    Assessment  68 year old female with untreated chronic Hep C genotype 3a presenting for evaluation of treatment.  No concurrent Hep B or HIV infection. No obvious fibrosis or cirrhosis seen on recent abdominal US but no record of Fibroscan in our system. Based on pt's active viral load, would recommend treatment. Discussed course, risks, and benefits of Hep C treatment with the expectation  that this would cure her Hep C. Discussed that we would need to check a fibroscan for insurance reasons prior to starting treatment and with this, I anticipate there should be no insurance issues with getting her coverage for a Hep C medication (Mavyret vs Harvoni). Pt says that she would like to hold off on any further testing as her abdominal ultrasound did not show any fibrosis and she currently feels well. She is very adverse to taking any medications for fear they would make her feel unwell. Pt says she understands the risks/benefits of treatment, but would prefer to hold off on any further workup or treatment of her hepatitis C at this time.     Plan  - avoid all alcohol in setting of chronic Hep C   - call or return to clinic at any time if she would elect to start treatment, we would be happy to see her   - follow up as needed     Staffed w/ MD Dayami White,   Internal Medicine, PGY-II    Alvaro Almeida MD  Hepatology  [facility]         Attestation with edits by Alvaro Joya MD at 10/2/2023  2:50 PM:  Physician Attestation  I, Alvaro Almeida MD, saw this patient and agree with the findings and plan of care as documented in the note.      68 year old female who presents for further evaluation and management of treatment-naive, GT-3 hepatitis C.  Low suspicion for cirrhosis based on physical examination, blood work and imaging.  Patient is ambivalent about undergoing treatment of hepatitis C as she does not want to take any additional medications, even in the short-term, as she is concerned about how this would affect her quality of life.  She will consider new information presented today and contact us if she wishes to pursue treatment of HCV.    We discussed benefits (improvements in overall and liver-related survival; reduce HCC risk), side effects and efficacy of antiviral therapy for HCV.    Items personally reviewed/procedural attestation: vitals, labs, and imaging and agree  with the interpretation documented in the note.          Again, thank you for allowing me to participate in the care of your patient.      Sincerely,    Alvaro Almeida MD

## 2023-10-05 NOTE — COMMUNITY RESOURCES LIST (ENGLISH)
10/05/2023   [facility]  N/A  For questions about this resource list or additional care needs, please contact your primary care clinic or care manager.  Phone: [phone]   Email: N/A   Address: [address] [place] [address]   Hours: N/A        Financial Stability       Rent and mortgage payment assistance  1  [facility] - Outpatient Addiction Treatment Distance: 0.5 miles      In-Person, Phone/Virtual   [address] [place] [address]  Language: English, Kittitian  Hours: Mon - Fri 8:00 AM - 4:30 PM  Fees: Insurance   Phone: (439) 553-7628 Email: roots@[employer] Website: https://PO-MO/roots/     2  [facility] - Multifamily Rental Assistance Program Distance: 2.31 miles      Phone/Virtual   [address] [facility] [address] [place] [address]  Language: English  Hours: Mon - Fri 8:00 AM - 5:00 PM Appt. Only  Fees: Free   Phone: (584) 818-5804 Email: [place].HealthCare.com@[facility] Website: https://www.[facility] CenterSe[facility].gov/index.html     Utility payment assistance  3  [employer] ([facility]) [facility] - Energy Assistance Distance: 0.61 miles      Phone/Virtual   [address] [place] [address]  Language: English, Hmong, Sierra Leonean, Kittitian  Hours: Mon - Fri 8:00 AM - 4:30 PM  Fees: Free   Phone: (554) 552-9231 Email: info@caprw.org Website: http://www.caprw.org/     4  [facility] Distance: 1.93 miles      Phone/Virtual   [address] [place] [address]  Language: English, Hmong, Cymraes, Kittitian  Hours: Mon - Fri 10:00 AM - 3:00 PM  Fees: Free   Phone: (724) 875-4125 Email: Kasandra@[facility].salvationarmy.org Website: http://salvBayhealth Medical Centerarmynorth.org/community/[facility]-[address]/          Food and Nutrition       Food pantry  5  [employer] - Emergency Food Shelf Distance: 0.71 miles      Pickup   [facility] [place]  [address]  Language: English, Chinese  Hours: Mon 9:30 AM - 11:30 AM Appt. Only  Fees: Free   Phone: (511) 341-5165 Email: keanu@[employer] Website: http://www.[employer].org/     6  [facility] Distance: 1.02 miles      Delivery, Pickup   [address] [place] [address]  Language: English, Chinese  Hours: Mon 9:00 AM - 6:00 PM Appt. Only, Tue - Fri 9:00 AM - 5:00 PM Appt. Only  Fees: Free   Phone: (416) 599-1111 Email: info@[employer] Website: http://www.[employer]/site     SNAP application assistance  7  [employer] ([facility] Distance: 0.61 miles      Phone/Virtual   [address] [facility] [address] [place] [address]  Language: English  Hours: Mon - Fri 8:00 AM - 4:30 PM  Fees: Free   Phone: (261) 252-2212 Email: info@caprw.org Website: http://www.caprw.org/     8  [facility] Distance: 1.82 miles      Phone/Virtual   [address] [facility] [address] [place] [address]  Language: English, Hmong, Cameroonian, Bhutanese, Chinese  Hours: Mon - Fri 8:30 AM - 4:30 PM  Fees: Free   Phone: (806) 955-4094 Email: helpline@hungers[employer].org Website: https://www.hungersolutions.org/programs/[place]-food-helpline/     Soup kitchen or free meals  9  [facility] [place] [facility] - Free Summer Meals Distance: 0.53 miles      In-Person   [address] [place] [address]  Language: English, Hmong, Chinese  Hours: Mon - Fri 12:00 PM - 1:00 PM , Mon - Fri 3:00 PM - 4:00 PM  Fees: Free   Phone: (636) 344-9284 Email: Pj@.[facility].mn. Website: https://www.[facility]/departments/olson-recreation/[facility]     10  [employer] [facility] Distance: 1.31 miles      [facility]   [address] [place] [address]  Language: English  Hours: Mon 12:00 PM - 2:00 PM , Wed 12:00 PM - 2:00 PM  Fees: Free   Phone: (646) 122-9402 Ext.4 Email: info@[employer] Website:  http://www.openhandsmidway.org          Transportation       Free or low-cost transportation  11  [employer] Distance: 2.77 miles      In-Person   [address] [place] [address]  Language: English, Swedish  Hours: Mon 9:00 AM - 5:00 PM , Tue 9:30 AM - 7:00 PM , Wed 9:00 AM - 5:00 PM , Thu 9:30 AM - 7:00 PM , Fri 9:00 AM - 5:00 PM  Fees: Free   Phone: (974) 526-1182 Email: contactus@[employer] Website: http://www.[employer]     12  [facility] Circulator Bus Distance: 5.24 miles      In-Person   [address] [place] [address]  Language: English  Hours: Tue 9:00 AM - 2:00 PM  Fees: Self Pay   Phone: (837) 806-3623 Email: info@[employer] Website: http://www.[employer]/     Transportation to medical appointments  13  [facility] - Non-Emergency Medical Transportation Distance: 1.94 miles      In-Person   [address] [place] [address]  Language: English  Hours: Mon - Fri 8:00 AM - 4:00 PM Appt. Only  Fees: Self Pay   Phone: (288) 156-1146 Website: http://www.[facility].org/Medical-Services/Emergency-medical-services/Non-emergency-transportation/     14  [facility] Distance: 2.06 miles      In-Person   [address] [place] [address]  Language: English  Hours: Mon - Fri 9:00 AM - 4:00 PM  Fees: Free   Phone: (193) 715-9915 Email: neighborhoodnetworkforseniors@[employer].com Website: http://www.neighborhoodnetworkforseniors.org/          Important Numbers & Websites       Emergency Services   911  City Services   311  Poison Control   (759) 256-8479  Suicide Prevention Lifeline   (912) 125-7785 (TALK)  Child Abuse Hotline   (740) 807-8466 (4-A-Child)  Sexual Assault Hotline   (188) 774-1732 (HOPE)  National Runaway Safeline   (603) 319-3926 (RUNAWAY)  All-Options Talkline   (134) 624-2473  Substance Abuse Referral   (913) 255-4402 (HELP)

## 2023-10-12 ENCOUNTER — OFFICE VISIT (OUTPATIENT)
Dept: INTERNAL MEDICINE | Facility: CLINIC | Age: 68
End: 2023-10-12
Payer: COMMERCIAL

## 2023-10-12 VITALS
TEMPERATURE: 98 F | HEART RATE: 85 BPM | SYSTOLIC BLOOD PRESSURE: 146 MMHG | RESPIRATION RATE: 13 BRPM | HEIGHT: 62 IN | WEIGHT: 105.2 LBS | DIASTOLIC BLOOD PRESSURE: 88 MMHG | BODY MASS INDEX: 19.36 KG/M2 | OXYGEN SATURATION: 98 %

## 2023-10-12 DIAGNOSIS — Z23 NEED FOR SHINGLES VACCINE: Primary | ICD-10-CM

## 2023-10-12 DIAGNOSIS — Z87.891 PERSONAL HISTORY OF TOBACCO USE, PRESENTING HAZARDS TO HEALTH: ICD-10-CM

## 2023-10-12 DIAGNOSIS — Z01.818 PRE-OPERATIVE EXAMINATION FOR INTERNAL MEDICINE: ICD-10-CM

## 2023-10-12 DIAGNOSIS — I10 BENIGN ESSENTIAL HTN: ICD-10-CM

## 2023-10-12 DIAGNOSIS — Z29.11 NEED FOR VACCINATION AGAINST RESPIRATORY SYNCYTIAL VIRUS: ICD-10-CM

## 2023-10-12 DIAGNOSIS — B18.2 CHRONIC HEPATITIS C WITHOUT HEPATIC COMA (H): ICD-10-CM

## 2023-10-12 DIAGNOSIS — F10.21 H/O ALCOHOL DEPENDENCE (H): ICD-10-CM

## 2023-10-12 PROCEDURE — 90678 RSV VACC PREF BIVALENT IM: CPT | Performed by: INTERNAL MEDICINE

## 2023-10-12 PROCEDURE — G0008 ADMIN INFLUENZA VIRUS VAC: HCPCS | Mod: 59 | Performed by: INTERNAL MEDICINE

## 2023-10-12 PROCEDURE — 99214 OFFICE O/P EST MOD 30 MIN: CPT | Mod: 25 | Performed by: INTERNAL MEDICINE

## 2023-10-12 PROCEDURE — 90662 IIV NO PRSV INCREASED AG IM: CPT | Performed by: INTERNAL MEDICINE

## 2023-10-12 PROCEDURE — 90472 IMMUNIZATION ADMIN EACH ADD: CPT | Performed by: INTERNAL MEDICINE

## 2023-10-12 RX ORDER — RESPIRATORY SYNCYTIAL VIRUS VACCINE 120MCG/0.5
0.5 KIT INTRAMUSCULAR ONCE
Qty: 1 EACH | Refills: 0 | Status: CANCELLED | OUTPATIENT
Start: 2023-10-12 | End: 2023-10-12

## 2023-10-12 RX ORDER — AMLODIPINE BESYLATE 2.5 MG/1
2.5 TABLET ORAL DAILY
Qty: 90 TABLET | Refills: 3 | Status: SHIPPED | OUTPATIENT
Start: 2023-10-12

## 2023-10-12 ASSESSMENT — PAIN SCALES - GENERAL: PAINLEVEL: NO PAIN (0)

## 2023-10-12 NOTE — PROGRESS NOTES
[facility]  [address]  [place] [address]  Phone: [phone]  Fax: [phone]  Primary Provider: Brigida Rodriguez  Pre-op Performing Provider: BRIGIDA RODRIGUEZ      PREOPERATIVE EVALUATION:  Today's date: 10/12/2023    Shantelle is a 68 year old female who presents for a preoperative evaluation.      10/12/2023     9:40 AM   Additional Questions   Roomed by jennifer leal       Surgical Information:  Surgery/Procedure: cataract left  [facility] Location[facility] [place]  Surgeon: Debbie  Surgery Date: 10/23-left,10/30-right  Time of Surgery: TBD  Where patient plans to recover: At home with family  Fax number for surgical facility: [phone]    Assessment & Plan     The proposed surgical procedure is considered LOW risk.    Need for shingles vaccine    Need for vaccination against respiratory syncytial virus  She can get the RSV shot today she is at the highest risk with her smoking history and being underweight.    Pre-operative examination for internal medicine    - Fibrosis Scan; Future    Benign essential HTN  Suboptimal blood pressure over the years her pattern shows a labile blood pressure.  Her average tends to be higher this is not a contraindication to her surgery.  But she can start low-dose amlodipine.  She is generally leery of starting medications but I did try and  her on the importance of having a lower average blood pressure  - amLODIPine (NORVASC) 2.5 MG tablet; Take 1 tablet (2.5 mg) by mouth daily    Chronic hepatitis C without hepatic coma (H)  She has high viral load did finally [facility].  He did suggest however only will be need to do FibroScan's before that is approved.  Was a misunderstanding she thought it was not covered by insurance. will order the FibroScan.  I suspect she is very leery of taking medicines I did try again to  her.     Personal history of tobacco use,  presenting hazards to health  Has stopped drinking alcohol but continues to smoke and is working on it.    H/O alcohol dependence (H)              - No identified additional risk factors other than previously addressed    Antiplatelet or Anticoagulation Medication Instructions:   - Patient is on no antiplatelet or anticoagulation medications.    Additional Medication Instructions:  Patient is to take all scheduled medications on the day of surgery    RECOMMENDATION:  APPROVAL GIVEN to proceed with proposed procedure, without further diagnostic evaluation.            Subjective       HPI related to upcoming procedure: cataract         10/5/2023     1:20 PM   Preop Questions   1. Have you ever had a heart attack or stroke? No   2. Have you ever had surgery on your heart or blood vessels, such as a stent placement, a coronary artery bypass, or surgery on an artery in your head, neck, heart, or legs? No   3. Do you have chest pain with activity? No   4. Do you have a history of  heart failure? YES -    5. Do you currently have a cold, bronchitis or symptoms of other infection? No   6. Do you have a cough, shortness of breath, or wheezing? No   7. Do you or anyone in your family have previous history of blood clots? No   8. Do you or does anyone in your family have a serious bleeding problem such as prolonged bleeding following surgeries or cuts? No   9. Have you ever had problems with anemia or been told to take iron pills? YES -    10. Have you had any abnormal blood loss such as black, tarry or bloody stools, or abnormal vaginal bleeding? No   11. Have you ever had a blood transfusion? No   12. Are you willing to have a blood transfusion if it is medically needed before, during, or after your surgery? NO -    13. Have you or any of your relatives ever had problems with anesthesia? No   14. Do you have sleep apnea, excessive snoring or daytime drowsiness? No   15. Do you have any artifical heart valves or other  implanted medical devices like a pacemaker, defibrillator, or continuous glucose monitor? No   16. Do you have artificial joints? No   17. Are you allergic to latex? No       Health Care Directive:  Patient does not have a Health Care Directive or Living Will:     Preoperative Review of :        Current Outpatient Medications   Medication    alendronate (FOSAMAX) 70 MG tablet    amLODIPine (NORVASC) 2.5 MG tablet    atorvastatin (LIPITOR) 10 MG tablet    cyclobenzaprine (FLEXERIL) 10 MG tablet    fexofenadine (ALLEGRA) 180 MG tablet    nicotine (NICOTROL) 10 MG inhaler     No current facility-administered medications for this visit.         Review of Systems  CONSTITUTIONAL: NEGATIVE for fever, chills, change in weight  ENT/MOUTH: NEGATIVE for ear, mouth and throat problems  RESP: NEGATIVE for significant cough or SOB  CV: NEGATIVE for chest pain, palpitations or peripheral edema    Patient Active Problem List    Diagnosis Date Noted    Health maintenance examination 07/28/2022     Priority: Medium     mammo annual   dexa 2021 osteoporosis   Colon 2020 normal next ten years   Annual lung cancer       Personal history of tobacco use, presenting hazards to health 01/22/2022     Priority: Medium    Hepatitis C, chronic (H) 02/25/2021     Priority: Medium    History of colonic polyps 04/03/2019     Priority: Medium    Mammogram Inconclusive Due To Dense Breasts      Priority: Medium     Created by Conversion          No past medical history on file.  Past Surgical History:   Procedure Laterality Date    BIOPSY BREAST Right 2002?    HYSTERECTOMY      OOPHORECTOMY      ZZC TOTAL ABDOM HYSTERECTOMY      Description: Total Abdominal Hysterectomy;  Recorded: 01/31/2011;  Comments: 2003     Current Outpatient Medications   Medication Sig Dispense Refill    alendronate (FOSAMAX) 70 MG tablet Take 1 tablet (70 mg) by mouth every 7 days 12 tablet 3    atorvastatin (LIPITOR) 10 MG tablet Take 1 tablet (10 mg) by mouth daily 90  "tablet 3    cyclobenzaprine (FLEXERIL) 10 MG tablet Take 1 tablet (10 mg) by mouth 3 times daily as needed for muscle spasms 90 tablet 1    fexofenadine (ALLEGRA) 180 MG tablet Take 1 tablet (180 mg) by mouth daily 90 tablet 3    nicotine (NICOTROL) 10 MG inhaler Use 1 cartridge as needed for urge to smoke by puffing over course of 20min.  Use 6-16 cart/day; reduce number of cart/day over 6-12 weeks. 100 each 3       Allergies   Allergen Reactions    Codeine Unknown        Social History     Tobacco Use    Smoking status: Every Day     Packs/day: 0.50     Years: 50.00     Additional pack years: 0.00     Total pack years: 25.00     Types: Cigarettes    Smokeless tobacco: Never   Substance Use Topics    Alcohol use: Yes     Comment: social       History   Drug Use Unknown         Objective     BP (!) 146/88 (BP Location: Left arm, Patient Position: Sitting, Cuff Size: Adult Large)   Pulse 85   Temp 98  F (36.7  C)   Resp 13   Ht 1.575 m (5' 2\")   Wt 47.7 kg (105 lb 3.2 oz)   LMP  (LMP Unknown)   SpO2 98%   BMI 19.24 kg/m      Physical Exam  GENERAL APPEARANCE: healthy, alert and no distress  HENT: ear canals and TM's normal and nose and mouth without ulcers or lesions  RESP: lungs clear to auscultation - no rales, rhonchi or wheezes  CV: regular rate and rhythm, normal S1 S2, no S3 or S4 and no murmur, click or rub   ABDOMEN: soft, nontender, no HSM or masses and bowel sounds normal  NEURO: Normal strength and tone, sensory exam grossly normal, mentation intact and speech normal    Recent Labs   Lab Test 07/13/23  1120 07/28/22  1139 01/19/22  1414   HGB 16.2* 15.2  --     155  --     138 139   POTASSIUM 4.4 3.7 3.9   CR 0.78 0.72 0.83   A1C  --   --  5.4        Diagnostics:  No labs were ordered during this visit.   No EKG required for low risk surgery (cataract, skin procedure, breast biopsy, etc).    Revised Cardiac Risk Index (RCRI):  The patient has the following serious cardiovascular risks " for perioperative complications:   - No serious cardiac risks = 0 points     RCRI Interpretation: 0 points: Class I (very low risk - 0.4% complication rate)         Signed Electronically by: Brigida Godinez MD  Copy of this evaluation report is provided to requesting physician.

## 2023-10-12 NOTE — COMMUNITY RESOURCES LIST (ENGLISH)
10/12/2023   [facility] - Outpatient Clinics  N/A  For additional resource needs, please contact your health insurance member services or your primary care team.  Phone: [phone]   Email: N/A   Address: [address] [place] [id number]   Hours: N/A        Financial Stability       Rent and mortgage payment assistance  1  [facility] - Outpatient Addiction Treatment Distance: 0.5 miles      In-Person, Phone/Virtual   [address] [place] [address]  Language: English, Kittitian  Hours: Mon - Fri 8:00 AM - 4:30 PM  Fees: Insurance   Phone: (745) 314-7747 Email: roots@[employer] Website: https://[employer]/roots/     2  [place] [employer] - Multifamily Rental Assistance Program Distance: 2.31 miles      Phone/Virtual   [address] [place] [address]  Language: English  Hours: Mon - Fri 8:00 AM - 5:00 PM Appt. Only  Fees: Free   Phone: (514) 729-2881 Email: [place].SeeControl@[facility]. Website: https://www.mnSeeControl.gov/index.html     Utility payment assistance  3  [place] [employer] - Energy and Utilities Distance: 2.4 miles      In-Person, Phone/Virtual   [address] [place] [address]  Language: English  Hours: Mon - Fri 8:30 AM - 4:30 PM  Fees: Free   Phone: (727) 958-8173 Website: https://[place].[facility]/Food.eee/energy/consumer-assistance/energy-assistance-program/     4  [place] Public Utilities Commission - [place]'s Telephone Assistance Plan (TAP) and Federal Lifeline and Affordable Connectivity Program (ACP) Distance: 8.76 miles      Phone/Virtual   [address] [place] [address]  Language: English  Fees: Free   Phone: (886) 443-7476 Email: consumer.puc@[facility]. Website: https://[place].gov/puc/consumers/telephone/          Food and Nutrition       Food pantry  5  [employer] - Emergency Food Shelf Distance: 0.71 miles      Pickup   [address] [place] [address]  Language: English,  Zimbabwean  Hours: Mon 9:30 AM - 11:30 AM Appt. Only  Fees: Free   Phone: (143) 774-4304 Email: keanu@[employer] Website: http://www.[employer].org/     6  [facility] Distance: 1.02 miles      Delivery, Pickup   [address] [place] [address]  Language: English, Zimbabwean  Hours: Mon 9:00 AM - 6:00 PM Appt. Only, Tue - Fri 9:00 AM - 5:00 PM Appt. Only  Fees: Free   Phone: (674) 196-4081 Email: info@[employer] Website: http://www.[employer].org/site     SNAP application assistance  7  [employer] [place] Distance: 1.82 miles      Phone/Virtual   [address] [facility] [address] [place] [address]  Language: English, Hmong, Cook Islander, Dutch, Zimbabwean  Hours: Mon - Fri 8:30 AM - 4:30 PM  Fees: Free   Phone: (182) 320-4991 Email: helpline@[employer].org Website: https://www.hungersolutions.org/programs/[place]-food-helpline/     8  [employer] ([facility] Distance: 0.61 miles      Phone/Virtual   [address] [facility] [address] [place] [address]  Language: English  Hours: Mon - Fri 8:00 AM - 4:30 PM  Fees: Free   Phone: (307) 369-6000 Email: info@caprw.org Website: http://www.caprw.org/     Soup kitchen or free meals  9  [facility] [place] [facility] - Free Summer Meals Distance: 0.53 miles      In-Person   [address] [facility] [place] [address]  Language: English, Hmong, Zimbabwean  Hours: Mon - Fri 12:00 PM - 1:00 PM , Mon - Fri 3:00 PM - 4:00 PM  Fees: Free   Phone: (841) 988-9158 Email: Pj@.[facility]. Website: https://www.[facility]/departments/olson-recreation/ybriqy-hvqhtorvy-voziop     10  [employer] [place] Distance: 1.31 miles      [address] [address]  Language: English  Hours: Mon 12:00 PM - 2:00 PM , Wed 12:00 PM - 2:00 PM  Fees: Free   Phone: (254) 811-3614 Ext.4 Email: info@"[employer] Website: http://www.Phantom.Jaypore          Transportation        Free or low-cost transportation  11  [facility] Distance: 2.77 miles      In-Person   [address] [place] [address]  Language: English, Icelandic  Hours: Mon 9:00 AM - 5:00 PM , Tue 9:30 AM - 7:00 PM , Wed 9:00 AM - 5:00 PM , Thu 9:30 AM - 7:00 PM , Fri 9:00 AM - 5:00 PM  Fees: Free   Phone: (942) 723-9295 Email: lidia@[employer] Website: http://www.[employer]     12  [place] [facility] Circulator Bus Distance: 5.24 miles      In-Person   [address] [place] [address]  Language: English  Hours: Tue 9:00 AM - 2:00 PM  Fees: Self Pay   Phone: (720) 383-5323 Email: info@[employer] Website: http://www.[employer].org/     Transportation to medical appointments  13  [employer] - Non-Emergency Medical Transportation Distance: 1.94 miles      In-Person   [address] [place] [address]  Language: English  Hours: Mon - Fri 8:00 AM - 4:00 PM Appt. Only  Fees: Self Pay   Phone: (382) 632-8303 Website: http://www.allHEMINGWAY.org/Medical-Services/Emergency-medical-services/Non-emergency-transportation/     14  [facility] Distance: 2.06 miles      In-Person   [address] [place] [address]  Language: English  Hours: Mon - Fri 9:00 AM - 4:00 PM  Fees: Free   Phone: (725) 629-2145 Email: [facility] Cent[facility]forseniors@[employer] Website: http://www.[facility] Medical CenternetGarnet Health Medical Centerforseniors.org/          Important Numbers & Websites       United Way   211 211itedway.org  Poison Control   (747) 137-8494 [place]poison.[place]  Suicide and Crisis Lifeline   988 988lifeline.org  Childhelp National Child Abuse Hotline   692.353.8268 Childhelphotline.org  National Sexual Assault Hotline   (556) 782-3581 (HOPE) Rainn.org  National Runaway Safeline   (427) 629-8803 (RUNAWAY) [facility] Hospital[facility].org  Pregnancy & Postpartum Support [place]   Call/text 482-401-5001 Ppsupportmn.org  Substance Abuse National Helpline ([facility]   483-383-HELP (4785)  Findtreatment.gov  Emergency Services   916

## 2023-12-28 ENCOUNTER — PATIENT OUTREACH (OUTPATIENT)
Dept: GERIATRIC MEDICINE | Facility: CLINIC | Age: 68
End: 2023-12-28
Payer: COMMERCIAL

## 2023-12-28 NOTE — PROGRESS NOTES
[facility] Care Coordination Contact      [facility] Six-Month Telephone Assessment    6 month telephone assessment completed on 12/28/23.    ER visits: No  Hospitalizations: No  TCU stays: No  Significant health status changes: none  Falls/Injuries: No  ADL/IADL changes: No  Changes in services: No    Caregiver Assessment follow up:  NA    Goals: See POC in chart for goal progress documentation.      Will see member in 6 months for an annual health risk assessment.   Encouraged member to call CC with any questions or concerns in the meantime.     Yudi Plascencia RN PHN  [facility]  [phone]

## 2023-12-31 ENCOUNTER — MYC REFILL (OUTPATIENT)
Dept: INTERNAL MEDICINE | Facility: CLINIC | Age: 68
End: 2023-12-31
Payer: COMMERCIAL

## 2023-12-31 DIAGNOSIS — I10 BENIGN ESSENTIAL HTN: ICD-10-CM

## 2024-01-02 RX ORDER — AMLODIPINE BESYLATE 2.5 MG/1
2.5 TABLET ORAL DAILY
Qty: 90 TABLET | Refills: 3 | OUTPATIENT
Start: 2024-01-02

## 2024-01-24 ENCOUNTER — PATIENT OUTREACH (OUTPATIENT)
Dept: GASTROENTEROLOGY | Facility: CLINIC | Age: 69
End: 2024-01-24
Payer: COMMERCIAL

## 2024-02-12 ENCOUNTER — IMMUNIZATION (OUTPATIENT)
Dept: FAMILY MEDICINE | Facility: CLINIC | Age: 69
End: 2024-02-12
Payer: COMMERCIAL

## 2024-02-12 DIAGNOSIS — Z23 ENCOUNTER FOR IMMUNIZATION: Primary | ICD-10-CM

## 2024-02-12 DIAGNOSIS — Z23 NEED FOR SHINGLES VACCINE: ICD-10-CM

## 2024-02-12 DIAGNOSIS — Z23 NEED FOR PROPHYLACTIC VACCINATION AGAINST HEPATITIS A AND HEPATITIS B IN ADULT: ICD-10-CM

## 2024-02-12 PROCEDURE — 90480 ADMN SARSCOV2 VAC 1/ONLY CMP: CPT

## 2024-02-12 PROCEDURE — 99207 PR NO CHARGE NURSE ONLY: CPT

## 2024-02-12 PROCEDURE — 91320 SARSCV2 VAC 30MCG TRS-SUC IM: CPT

## 2024-02-12 NOTE — PROGRESS NOTES
Prior to immunization administration, verified patients identity using patient s name and date of birth. Please see Immunization Activity for additional information.     Screening Questionnaire for Adult Immunization    Are you sick today?   Yes   Do you have allergies to medications, food, a vaccine component or latex?   Yes   Have you ever had a serious reaction after receiving a vaccination?   No   Do you have a long-term health problem with heart, lung, kidney, or metabolic disease (e.g., diabetes), asthma, a blood disorder, no spleen, complement component deficiency, a cochlear implant, or a spinal fluid leak?  Are you on long-term aspirin therapy?   No   Do you have cancer, leukemia, HIV/AIDS, or any other immune system problem?   No   Do you have a parent, brother, or sister with an immune system problem?   No   In the past 3 months, have you taken medications that affect  your immune system, such as prednisone, other steroids, or anticancer drugs; drugs for the treatment of rheumatoid arthritis, Crohn s disease, or psoriasis; or have you had radiation treatments?   No   Have you had a seizure, or a brain or other nervous system problem?   No   During the past year, have you received a transfusion of blood or blood    products, or been given immune (gamma) globulin or antiviral drug?   No   For women: Are you pregnant or is there a chance you could become       pregnant during the next month?   No   Have you received any vaccinations in the past 4 weeks?   No     Immunization questionnaire was positive for at least one answer.  Notified Kamal.    I have reviewed the following standing orders:   This patient is due and qualifies for the Covid-19 vaccine.     Click here for COVID-19 Standing Order    I have reviewed the vaccines inclusion and exclusion criteria; There were concerns regarding the following exclusions, codeine allergy, has a head cold today. I have brought them to a provider who approved  vaccination.    Patient instructed to remain in clinic for 15 minutes afterwards, and to report any adverse reactions.     Screening performed by Ilda Boyce MA on 2/12/2024 at 10:16 AM.

## 2024-02-28 ENCOUNTER — PATIENT OUTREACH (OUTPATIENT)
Dept: GERIATRIC MEDICINE | Facility: CLINIC | Age: 69
End: 2024-02-28
Payer: COMMERCIAL

## 2024-02-28 NOTE — PROGRESS NOTES
[facility] Care Coordination Contact    CHW contacted w/ mbr to make sure if she received or submitted her MA renewal paperwork. Mbr indicated that she didn't receive any MA paperwork. CHW confirm w/mbr about her address, and mbr's residency didn't change, CHW offered mbr to mail the MA paperwork to her and to check / deyvi about the status of her MA and to get her MA paperwork. Mbr rather calling the [facility] worker by her self and check with [facility].      LISA Bonner  [facility]  [phone]

## 2024-03-12 ENCOUNTER — MYC MEDICAL ADVICE (OUTPATIENT)
Dept: INTERNAL MEDICINE | Facility: CLINIC | Age: 69
End: 2024-03-12
Payer: COMMERCIAL

## 2024-04-18 ENCOUNTER — MYC REFILL (OUTPATIENT)
Dept: INTERNAL MEDICINE | Facility: CLINIC | Age: 69
End: 2024-04-18
Payer: COMMERCIAL

## 2024-04-18 ENCOUNTER — MYC MEDICAL ADVICE (OUTPATIENT)
Dept: INTERNAL MEDICINE | Facility: CLINIC | Age: 69
End: 2024-04-18
Payer: COMMERCIAL

## 2024-04-18 DIAGNOSIS — I10 BENIGN ESSENTIAL HTN: ICD-10-CM

## 2024-04-18 RX ORDER — AMLODIPINE BESYLATE 2.5 MG/1
2.5 TABLET ORAL DAILY
Qty: 90 TABLET | Refills: 3 | OUTPATIENT
Start: 2024-04-18

## 2024-05-31 ENCOUNTER — PATIENT OUTREACH (OUTPATIENT)
Dept: GERIATRIC MEDICINE | Facility: CLINIC | Age: 69
End: 2024-05-31
Payer: COMMERCIAL

## 2024-05-31 NOTE — LETTER
June 13, 2024    Important [employer] Information    SHANTELLE MORTEZA HAMMOND  [address] [place] [facility] [address]  I've Tried to Contact You  Dear Shantelle,  My name is  Yudi Plascencia RN, PHN, and I am your Care Coordinator. I have been trying to contact you, but have not been able to reach you.  As a Care Coordinator, I am here to help. My role is to make sure that your health plan is working for you. I am available to:  Review your health care needs with you over the phone or in-person   Provide support for and information about covered services or supplies to help keep you safe and healthy in your home  Answer questions about your insurance   Help you find a provider, such as a doctor or dentist, to meet your unique needs  I can also help you schedule a free physical at your clinic. To schedule an appointment, please call me at [phone] Monday-Friday between 8am-5pm TTY: 711.  I have included a copy of a Member Engagement Questionnaire. Please fill it out and return it in the included envelope I have also included a document that provides you with more information about my role as your Care Coordinator and how I can help with your medical, social and everyday needs.     Questions?  Please call me at the phone number listed above. If you d like, a friend or family member may call for you.  For general questions, call:   [employer] Member Services at [phone] between the hours of 8 a.m. to 9 p.m. Central, seven days a week. TTY: 711.  Please note that access to a representative may be limited during certain times of the year.    Sincerely,    Yudi Plascencia RN, PHN  [phone]  [email or web address]      cc: member records                                                                      [employer] DUAL Solution  and [employer] AccessAbility Solution  Enhanced are HMO D-SNPs that contract with both Medicare and the [place] Medical Assistance (Medicaid) program to provide benefits of both programs to enrollees.  Enrollment in [employer] DUAL Solution and [employer] AccessAbility Solution Enhanced depends on contract renewal.      2023 [employer].

## 2024-05-31 NOTE — PROGRESS NOTES
[facility] Care Coordination Contact    Completed 4 attempts to reach client with no response.  Member is officially unable to contact effective today.  Completed MMIS entry.  Completed health plan required [facility] POC.    Follow-up Plan: CC will attempt to reach member in six months.    This CC note routed to PCP, Brigida Godinez RN PHN  [facility]  [phone]

## 2024-05-31 NOTE — LETTER
June 5, 2024    Important [employer] Information    SHANTELLE MORTEZA HAMMOND  [address] [place] [facility] [address]  I've Tried to Contact You  Dear Shantelle,  My name is  Yudi Plascencia RN, PHN, and I am your Care Coordinator. I have been trying to contact you, but have not been able to reach you.  As a Care Coordinator, I am here to help. My role is to make sure that your health plan is working for you. I am available to:  Review your health care needs with you over the phone or in-person   Provide support for and information about covered services or supplies to help keep you safe and healthy in your home  Answer questions about your insurance   Help you find a provider, such as a doctor or dentist, to meet your unique needs  I can also help you schedule a free physical at your clinic. To schedule an appointment, please call me at [phone] Monday-Friday between 8am-5pm TTY: 711.  I have included a copy of a Member Engagement Questionnaire. Please fill it out and return it in the included envelope I have also included a document that provides you with more information about my role as your Care Coordinator and how I can help with your medical, social and everyday needs.     Questions?  Please call me at the phone number listed above. If you d like, a friend or family member may call for you.  For general questions, call:   [employer] Member Services at [phone] between the hours of 8 a.m. to 9 p.m. Central, seven days a week. TTY: 711.  Please note that access to a representative may be limited during certain times of the year.    Sincerely,    Yudi Plascencia RN, PHN  [phone]  [email or web address]      cc: member records                                                                      [employer] DUAL Solution  and [employer] AccessAbility Solution  Enhanced are HMO D-SNPs that contract with both Medicare and the [place] Medical Assistance (Medicaid) program to provide benefits of both programs to enrollees.  Enrollment in [employer] DUAL Solution and [employer] AccessAbility Solution Enhanced depends on contract renewal.      2023 [employer].

## 2024-06-05 NOTE — PROGRESS NOTES
"[facility] Care Coordination Contact    Per CC, mailed client an \"Unable to Contact\" letter.    Corina Liu  Care Management Specialist  [facility]  [phone]      "

## 2024-06-13 ENCOUNTER — PATIENT OUTREACH (OUTPATIENT)
Dept: CARE COORDINATION | Facility: CLINIC | Age: 69
End: 2024-06-13
Payer: COMMERCIAL

## 2024-06-13 NOTE — PROGRESS NOTES
"[facility] Care Coordination Contact    Per CC, mailed client an \"Unable to Contact\" letter.  Mailed member [employer] Member Engagement Questionnaire with self-addressed return envelope & supporting documents as required.       Michael Han  [facility]  Case Management Specialist  [phone]    "

## 2024-07-14 ENCOUNTER — MYC REFILL (OUTPATIENT)
Dept: INTERNAL MEDICINE | Facility: CLINIC | Age: 69
End: 2024-07-14
Payer: COMMERCIAL

## 2024-07-14 DIAGNOSIS — M54.50 BILATERAL LOW BACK PAIN WITHOUT SCIATICA, UNSPECIFIED CHRONICITY: ICD-10-CM

## 2024-07-14 RX ORDER — CYCLOBENZAPRINE HCL 10 MG
10 TABLET ORAL 3 TIMES DAILY PRN
Qty: 90 TABLET | Refills: 1 | Status: CANCELLED | OUTPATIENT
Start: 2024-07-14

## 2024-07-16 ENCOUNTER — OFFICE VISIT (OUTPATIENT)
Dept: FAMILY MEDICINE | Facility: CLINIC | Age: 69
End: 2024-07-16
Payer: COMMERCIAL

## 2024-07-16 ENCOUNTER — PATIENT OUTREACH (OUTPATIENT)
Dept: GASTROENTEROLOGY | Facility: CLINIC | Age: 69
End: 2024-07-16
Payer: COMMERCIAL

## 2024-07-16 VITALS
HEART RATE: 83 BPM | TEMPERATURE: 98.2 F | RESPIRATION RATE: 16 BRPM | SYSTOLIC BLOOD PRESSURE: 118 MMHG | HEIGHT: 62 IN | DIASTOLIC BLOOD PRESSURE: 70 MMHG | OXYGEN SATURATION: 96 % | WEIGHT: 109.8 LBS | BODY MASS INDEX: 20.2 KG/M2

## 2024-07-16 DIAGNOSIS — M54.41 CHRONIC RIGHT-SIDED LOW BACK PAIN WITH RIGHT-SIDED SCIATICA: Primary | ICD-10-CM

## 2024-07-16 DIAGNOSIS — G89.29 CHRONIC RIGHT-SIDED LOW BACK PAIN WITH RIGHT-SIDED SCIATICA: Primary | ICD-10-CM

## 2024-07-16 PROCEDURE — 99214 OFFICE O/P EST MOD 30 MIN: CPT

## 2024-07-16 PROCEDURE — G2211 COMPLEX E/M VISIT ADD ON: HCPCS

## 2024-07-16 RX ORDER — CYCLOBENZAPRINE HCL 10 MG
10 TABLET ORAL 3 TIMES DAILY PRN
Qty: 42 TABLET | Refills: 0 | Status: SHIPPED | OUTPATIENT
Start: 2024-07-16 | End: 2024-08-15

## 2024-07-16 ASSESSMENT — ENCOUNTER SYMPTOMS: BACK PAIN: 1

## 2024-07-16 ASSESSMENT — PAIN SCALES - GENERAL: PAINLEVEL: EXTREME PAIN (9)

## 2024-07-16 NOTE — PROGRESS NOTES
Assessment & Plan     (M54.41,  G89.29) Chronic right-sided low back pain with right-sided sciatica  (primary encounter diagnosis)  Comment: Chronic with acute flare.  No concerns for septic joint, severe radiculopathy, cauda equina, or findings that would warrant the need for immediate medical attention.  Discussed concerns of radiculopathy with patient, and did recommend utilizing a steroid burst for management, but patient declines steroid burst at this time.  She is getting benefit from single dose of Flexeril as well as more conservative therapy options such as icing, so we will move forward with prescribing Flexeril for pain management as well as encouragement to continue with icing and Tylenol as needed for ongoing pain.  Educated her that if she is having worsening radiculopathy, a steroid burst may not be unavoidable.  Would like her to come back in 1 to 2 weeks if her symptoms are worsening or not improving, as we will need to rediscuss options for steroid burst at that time. Offered education on medications including appropriate dosing, possible side effects, and possible adverse effects.  Education given on return to clinic instructions as well as alarm signs that would require the need for immediate medical attention.  Patient attested to understanding.  Plan: cyclobenzaprine (FLEXERIL) 10 MG tablet       Nicotine/Tobacco Cessation  She reports that she has been smoking cigarettes. She has a 25 pack-year smoking history. She has never used smokeless tobacco.  Nicotine/Tobacco Cessation Plan  Patient currently utilizing nicotine inhaler.  Still currently smoking, and not interested in further medication management, resources, or referral at this time    Prescription drug management  I spent a total of 22 minutes on the day of the visit.   Time spent by me doing chart review, history and exam, documentation and further activities per the note    FUTURE APPOINTMENTS:       - Follow-up visit in 1 to 2  weeks if symptoms are worsening or not improving       - Follow-up for annual visit or as needed  Patient Instructions   Back Pain    Pain Management  Begin taking medications to manage back pain today as we discussed. These will work best to manage your pain when taken around the clock rather than every so often when your pain is worse  -Tylenol 325-650mg every 4-6 hours  -Ibuprofen 600-800mg every 6-8 hours  -Flexeril 10mg up to 3 times daily as needed for muscle spasming -this medication can cause drowsiness, so please be aware of this when taking it, and avoid driving, operating large machinery, or engage in activity that requires you to be alert.    Apply cold packs to the area for 20min at a time 4 times per day. This will help to reduce swelling and offer some numbing to manage pain. You can also use warm packs to manage pain if this provides better relief.    Movement  It is very important to maintain your normal daily activities as tolerated. Avoiding sitting, laying, standing, or remaining in one position for extended periods of time. Continuing to stretch and move your body will help to encourage healing and keep your muscles loose.    I have attached some exercises to your after visit summary that you can utilize at home to manage her back pain    Next Step  If this plan doesn't seem to be helping after 2-3 weeks then I want you to come back to see me, and we can discuss what the next options might be.     Seek emergency medical help If pain becomes unbearable, is not responding to pain medication, or is accompanied by fever, warmth, or loss of function.  Please also seek immediate medical attention with significantly increased swelling, tenderness, redness, or warmth of the affected extremity, or if you begin to experience chest pain, shortness of breath, blurry or double vision, or lightheadedness or dizziness    Subjective   Shantelle is a 69 year old, presenting for the following health issues:  Back  "Pain        7/16/2024    10:40 AM   Additional Questions   Roomed by Kim BUSBY CMA     Back Pain       Shantelle is a 69-year-old female with a past medical history significant for chronic hepatitis C, current tobacco use, and chronic low back pain who presents today with new onset acute right-sided low back pain.  Patient reports that about 4 days ago she began to experience pain in her right side of her low back that extended into her right buttocks, right groin, right knee, and intermittently into the right foot.  She has a hard time characterizing the pain sensation, but reports that the pain in her foot feels \"weird\", the sensation in her knee feels \"tight\" and the pain in her groin feels like a burning and stabbing pain.  Her pain is always previously been managed with Flexeril as needed, and she feels that this is quite beneficial.  She took 1 dose of this from some that she had previously, and felt that this was helpful for her low back pain, but she is continuing to have pain into her groin and knee.  She has tried icing, and this has helped intermittently, but she is concerned that the discomfort is not resolving.  On top of this, she has some swelling through her right groin, and right knee.  She declines any numbness, tingling, or weakness in any extremity, and also declines any redness or significant heat.  She declines any swelling that extends into her foot or ankle.  She declines any fever, chills, body aches, chest pain, shortness of breath, upper respiratory illness symptoms, or GI upset.  Patient is currently smoking and also using her nicotine inhaler.  She declines any numbness or tingling in her saddle area, and also declines any changes in her bowel or bladder habits.      Pain History:  When did you first notice your pain? 4 days ago   Have you seen anyone else for your pain? No  How has your pain affected your ability to work? Not applicable  Where in your body do you have pain? Back " "Pain  Onset/Duration: 4 days ago  Description:   Location of pain: low back right  Character of pain: stabbing  Pain radiation: radiates into the right buttocks, radiates into the right leg, and radiates into the right foot  New numbness or weakness in legs, not attributed to pain: YES  Intensity: Currently 9/10  Progression of Symptoms: same  History:   Specific cause: none  Pain interferes with job: N/A  History of back problems: bulged disc  Any previous MRI or X-rays: None  Sees a specialist for back pain: No  Alleviating factors:   Improved by: muscle relaxants    Precipitating factors:  Worsened by: Walking and turning a certain way  Therapies tried and outcome: cold, muscle relaxants, and rest        Accompanying Signs & Symptoms:  Risk of Fracture: None  Risk of Cauda Equina: None  Risk of Infection: None  Risk of Cancer: None  Risk of Ankylosing Spondylitis: Onset at age <35, male, AND morning back stiffness  no     Review of Systems  Constitutional, HEENT, cardiovascular, pulmonary, gi and gu systems are negative, except as otherwise noted.      Objective    /70 (BP Location: Left arm, Patient Position: Sitting, Cuff Size: Adult Regular)   Pulse 83   Temp 98.2  F (36.8  C) (Oral)   Resp 16   Ht 1.575 m (5' 2\")   Wt 49.8 kg (109 lb 12.8 oz)   LMP  (LMP Unknown)   SpO2 96%   BMI 20.08 kg/m    Body mass index is 20.08 kg/m .  Physical Exam   GENERAL: alert and no distress  NECK: no adenopathy, no asymmetry, masses, or scars  RESP: lungs clear to auscultation - no rales, rhonchi or wheezes  CV: regular rate and rhythm, normal S1 S2, no S3 or S4, no murmur, click or rub, no peripheral edema  ABDOMEN: soft, nontender, no hepatosplenomegaly, no masses and bowel sounds normal  MS: Diffuse nonpitting edema generalized around the right knee with tenderness over anserine bursa and medial aspect of right knee.  Pulses, strength, and sensation intact in foot and ankle.  Strength and sensation intact " throughout the entire leg.  Range of motion largely intact in all planes with increased pain to straight leg raise on the right side as well as abduction and adduction of the right hip.  SKIN: no suspicious lesions or rashes  NEURO: Normal strength and tone, mentation intact and speech normal    Amber Tian DNP FNP-C  Family Nurse Practitioner - Same Day Provider  [facility] [place]        Signed Electronically by: LUIS FERNANDO Bowman CNP

## 2024-07-16 NOTE — PATIENT INSTRUCTIONS
Back Pain    Pain Management  Begin taking medications to manage back pain today as we discussed. These will work best to manage your pain when taken around the clock rather than every so often when your pain is worse  -Tylenol 325-650mg every 4-6 hours  -Ibuprofen 600-800mg every 6-8 hours  -Flexeril 10mg up to 3 times daily as needed for muscle spasming -this medication can cause drowsiness, so please be aware of this when taking it, and avoid driving, operating large machinery, or engage in activity that requires you to be alert.    Apply cold packs to the area for 20min at a time 4 times per day. This will help to reduce swelling and offer some numbing to manage pain. You can also use warm packs to manage pain if this provides better relief.    Movement  It is very important to maintain your normal daily activities as tolerated. Avoiding sitting, laying, standing, or remaining in one position for extended periods of time. Continuing to stretch and move your body will help to encourage healing and keep your muscles loose.    I have attached some exercises to your after visit summary that you can utilize at home to manage her back pain    Next Step  If this plan doesn't seem to be helping after 2-3 weeks then I want you to come back to see me, and we can discuss what the next options might be.     Seek emergency medical help If pain becomes unbearable, is not responding to pain medication, or is accompanied by fever, warmth, or loss of function.  Please also seek immediate medical attention with significantly increased swelling, tenderness, redness, or warmth of the affected extremity, or if you begin to experience chest pain, shortness of breath, blurry or double vision, or lightheadedness or dizziness

## 2024-08-07 SDOH — HEALTH STABILITY: PHYSICAL HEALTH: ON AVERAGE, HOW MANY MINUTES DO YOU ENGAGE IN EXERCISE AT THIS LEVEL?: 30 MIN

## 2024-08-07 SDOH — HEALTH STABILITY: PHYSICAL HEALTH: ON AVERAGE, HOW MANY DAYS PER WEEK DO YOU ENGAGE IN MODERATE TO STRENUOUS EXERCISE (LIKE A BRISK WALK)?: 2 DAYS

## 2024-08-07 ASSESSMENT — SOCIAL DETERMINANTS OF HEALTH (SDOH): HOW OFTEN DO YOU GET TOGETHER WITH FRIENDS OR RELATIVES?: MORE THAN THREE TIMES A WEEK

## 2024-08-08 ENCOUNTER — OFFICE VISIT (OUTPATIENT)
Dept: INTERNAL MEDICINE | Facility: CLINIC | Age: 69
End: 2024-08-08
Payer: COMMERCIAL

## 2024-08-08 VITALS
HEIGHT: 62 IN | SYSTOLIC BLOOD PRESSURE: 143 MMHG | BODY MASS INDEX: 19.51 KG/M2 | HEART RATE: 75 BPM | WEIGHT: 106 LBS | TEMPERATURE: 98.7 F | RESPIRATION RATE: 13 BRPM | OXYGEN SATURATION: 98 % | DIASTOLIC BLOOD PRESSURE: 88 MMHG

## 2024-08-08 DIAGNOSIS — E78.2 MIXED HYPERLIPIDEMIA: ICD-10-CM

## 2024-08-08 DIAGNOSIS — Z11.59 ENCOUNTER FOR SCREENING FOR OTHER VIRAL DISEASES: ICD-10-CM

## 2024-08-08 DIAGNOSIS — I25.83 CORONARY ARTERY DISEASE DUE TO LIPID RICH PLAQUE: ICD-10-CM

## 2024-08-08 DIAGNOSIS — J44.9 CHRONIC OBSTRUCTIVE PULMONARY DISEASE, UNSPECIFIED COPD TYPE (H): Primary | ICD-10-CM

## 2024-08-08 DIAGNOSIS — Z87.891 PERSONAL HISTORY OF NICOTINE DEPENDENCE: ICD-10-CM

## 2024-08-08 DIAGNOSIS — Z00.00 HEALTH MAINTENANCE EXAMINATION: ICD-10-CM

## 2024-08-08 DIAGNOSIS — B18.2 CHRONIC HEPATITIS C WITHOUT HEPATIC COMA (H): ICD-10-CM

## 2024-08-08 DIAGNOSIS — Z12.31 ENCOUNTER FOR SCREENING MAMMOGRAM FOR BREAST CANCER: ICD-10-CM

## 2024-08-08 DIAGNOSIS — Z23 NEED FOR PROPHYLACTIC VACCINATION AGAINST HEPATITIS B VIRUS: ICD-10-CM

## 2024-08-08 DIAGNOSIS — Z23 NEED FOR PROPHYLACTIC VACCINATION AGAINST HEPATITIS A: ICD-10-CM

## 2024-08-08 DIAGNOSIS — Z23 NEED FOR SHINGLES VACCINE: ICD-10-CM

## 2024-08-08 DIAGNOSIS — R79.9 ABNORMAL FINDING OF BLOOD CHEMISTRY, UNSPECIFIED: ICD-10-CM

## 2024-08-08 DIAGNOSIS — R73.03 PREDIABETES: ICD-10-CM

## 2024-08-08 DIAGNOSIS — M81.0 SENILE OSTEOPOROSIS: ICD-10-CM

## 2024-08-08 DIAGNOSIS — F10.21 H/O ALCOHOL DEPENDENCE (H): ICD-10-CM

## 2024-08-08 DIAGNOSIS — I25.10 CORONARY ARTERY DISEASE DUE TO LIPID RICH PLAQUE: ICD-10-CM

## 2024-08-08 LAB
ALBUMIN SERPL BCG-MCNC: 4.5 G/DL (ref 3.5–5.2)
ALP SERPL-CCNC: 71 U/L (ref 40–150)
ALT SERPL W P-5'-P-CCNC: 42 U/L (ref 0–50)
ANION GAP SERPL CALCULATED.3IONS-SCNC: 10 MMOL/L (ref 7–15)
AST SERPL W P-5'-P-CCNC: 51 U/L (ref 0–45)
BASOPHILS # BLD AUTO: 0 10E3/UL (ref 0–0.2)
BASOPHILS NFR BLD AUTO: 0 %
BILIRUB SERPL-MCNC: 0.7 MG/DL
BUN SERPL-MCNC: 9.2 MG/DL (ref 8–23)
CALCIUM SERPL-MCNC: 9.6 MG/DL (ref 8.8–10.4)
CHLORIDE SERPL-SCNC: 101 MMOL/L (ref 98–107)
CHOLEST SERPL-MCNC: 184 MG/DL
CREAT SERPL-MCNC: 0.75 MG/DL (ref 0.51–0.95)
CREAT UR-MCNC: 25 MG/DL
EGFRCR SERPLBLD CKD-EPI 2021: 85 ML/MIN/1.73M2
EOSINOPHIL # BLD AUTO: 0.1 10E3/UL (ref 0–0.7)
EOSINOPHIL NFR BLD AUTO: 1 %
ERYTHROCYTE [DISTWIDTH] IN BLOOD BY AUTOMATED COUNT: 13.4 % (ref 10–15)
FASTING STATUS PATIENT QL REPORTED: YES
FASTING STATUS PATIENT QL REPORTED: YES
GLUCOSE SERPL-MCNC: 94 MG/DL (ref 70–99)
HAV AB SER QL IA: REACTIVE
HBA1C MFR BLD: 5.4 % (ref 0–5.6)
HBV SURFACE AB SERPL IA-ACNC: <3.5 M[IU]/ML
HBV SURFACE AB SERPL IA-ACNC: NONREACTIVE M[IU]/ML
HCO3 SERPL-SCNC: 30 MMOL/L (ref 22–29)
HCT VFR BLD AUTO: 47.2 % (ref 35–47)
HCV AB SERPL QL IA: REACTIVE
HDLC SERPL-MCNC: 104 MG/DL
HGB BLD-MCNC: 15.9 G/DL (ref 11.7–15.7)
IMM GRANULOCYTES # BLD: 0 10E3/UL
IMM GRANULOCYTES NFR BLD: 0 %
INR PPP: 0.99 (ref 0.85–1.15)
LDLC SERPL CALC-MCNC: 68 MG/DL
LYMPHOCYTES # BLD AUTO: 2.2 10E3/UL (ref 0.8–5.3)
LYMPHOCYTES NFR BLD AUTO: 28 %
MCH RBC QN AUTO: 33.2 PG (ref 26.5–33)
MCHC RBC AUTO-ENTMCNC: 33.7 G/DL (ref 31.5–36.5)
MCV RBC AUTO: 99 FL (ref 78–100)
MICROALBUMIN UR-MCNC: <12 MG/L
MICROALBUMIN/CREAT UR: NORMAL MG/G{CREAT}
MONOCYTES # BLD AUTO: 0.5 10E3/UL (ref 0–1.3)
MONOCYTES NFR BLD AUTO: 6 %
NEUTROPHILS # BLD AUTO: 5.2 10E3/UL (ref 1.6–8.3)
NEUTROPHILS NFR BLD AUTO: 65 %
NONHDLC SERPL-MCNC: 80 MG/DL
PLATELET # BLD AUTO: 175 10E3/UL (ref 150–450)
POTASSIUM SERPL-SCNC: 4 MMOL/L (ref 3.4–5.3)
PROT SERPL-MCNC: 8.3 G/DL (ref 6.4–8.3)
RBC # BLD AUTO: 4.79 10E6/UL (ref 3.8–5.2)
SODIUM SERPL-SCNC: 141 MMOL/L (ref 135–145)
TRIGL SERPL-MCNC: 58 MG/DL
TSH SERPL DL<=0.005 MIU/L-ACNC: 0.73 UIU/ML (ref 0.3–4.2)
WBC # BLD AUTO: 8 10E3/UL (ref 4–11)

## 2024-08-08 PROCEDURE — 83036 HEMOGLOBIN GLYCOSYLATED A1C: CPT | Performed by: INTERNAL MEDICINE

## 2024-08-08 PROCEDURE — 86706 HEP B SURFACE ANTIBODY: CPT | Performed by: INTERNAL MEDICINE

## 2024-08-08 PROCEDURE — 82043 UR ALBUMIN QUANTITATIVE: CPT | Performed by: INTERNAL MEDICINE

## 2024-08-08 PROCEDURE — 80053 COMPREHEN METABOLIC PANEL: CPT | Performed by: INTERNAL MEDICINE

## 2024-08-08 PROCEDURE — G0439 PPPS, SUBSEQ VISIT: HCPCS | Performed by: INTERNAL MEDICINE

## 2024-08-08 PROCEDURE — 85025 COMPLETE CBC W/AUTO DIFF WBC: CPT | Performed by: INTERNAL MEDICINE

## 2024-08-08 PROCEDURE — 87522 HEPATITIS C REVRS TRNSCRPJ: CPT | Performed by: INTERNAL MEDICINE

## 2024-08-08 PROCEDURE — 99214 OFFICE O/P EST MOD 30 MIN: CPT | Mod: 25 | Performed by: INTERNAL MEDICINE

## 2024-08-08 PROCEDURE — 80061 LIPID PANEL: CPT | Performed by: INTERNAL MEDICINE

## 2024-08-08 PROCEDURE — 86803 HEPATITIS C AB TEST: CPT | Performed by: INTERNAL MEDICINE

## 2024-08-08 PROCEDURE — 82570 ASSAY OF URINE CREATININE: CPT | Performed by: INTERNAL MEDICINE

## 2024-08-08 PROCEDURE — 99000 SPECIMEN HANDLING OFFICE-LAB: CPT | Performed by: INTERNAL MEDICINE

## 2024-08-08 PROCEDURE — 36415 COLL VENOUS BLD VENIPUNCTURE: CPT | Performed by: INTERNAL MEDICINE

## 2024-08-08 PROCEDURE — 86708 HEPATITIS A ANTIBODY: CPT | Performed by: INTERNAL MEDICINE

## 2024-08-08 PROCEDURE — 85610 PROTHROMBIN TIME: CPT | Performed by: INTERNAL MEDICINE

## 2024-08-08 PROCEDURE — 84443 ASSAY THYROID STIM HORMONE: CPT | Performed by: INTERNAL MEDICINE

## 2024-08-08 PROCEDURE — 87902 NFCT AGT GNTYP ALYS HEP C: CPT | Mod: 90 | Performed by: INTERNAL MEDICINE

## 2024-08-08 ASSESSMENT — PAIN SCALES - GENERAL: PAINLEVEL: MILD PAIN (2)

## 2024-08-08 NOTE — PROGRESS NOTES
Preventive Care Visit  [facility]  Brigida Godinez MD, Internal Medicine  Aug 8, 2024      Assessment & Plan     Need for shingles vaccine  Discussed  - zoster vaccine recombinant adjuvanted (SHINGRIX) injection; Inject 0.5 mLs into the muscle once for 1 dose Pharmacist administered    Need for prophylactic vaccination against hepatitis A  She would like to check antibody levels before getting injection    Need for prophylactic vaccination against hepatitis B virus      Chronic obstructive pulmonary disease, unspecified COPD type (H)  Stable not on oxygen    H/O alcohol dependence (H)  She continues to drink alcohol but says she is cut down    Chronic hepatitis C without hepatic coma (H)  Continues to decline treatment.  Given that she still continues to drink she probably is not eligible.  She is hepatitis C 3 a genotype.  - Hepatitis C Screen Reflex to HCV RNA Quant and Genotype; Future  - INR; Future  - Hepatitis A Antibody Total; Future  - Hepatitis B Surface Antibody; Future  - TSH; Future  - Hepatitis C Screen Reflex to HCV RNA Quant and Genotype  - INR  - Hepatitis A Antibody Total  - Hepatitis B Surface Antibody  - TSH  - Hepatitis C RNA, Quantitative by PCR with Confirmatory Reflex to Genotyping    Health maintenance examination      Colonoscopy is not due till 2030.  Mammogram is annual.  She was recommended to get her mammogram and annual bone lung cancer screen  Senile osteoporosis  Not clear if she has been truly compliant with her Fosamax we will recommend bone density for surveillance.  - DX Bone Density; Future    Coronary artery disease due to lipid rich plaque  Not clear if she is compliant with her statin but she does say she is  -Lipid panel reflex to direct LDL Non-fasting; Future  - Lipid panel reflex to direct LDL Non-fasting    Mixed hyperlipidemia    - Comprehensive metabolic panel; Future  - Hemoglobin A1c; Future  - CBC with platelets and differential; Future  -  Comprehensive metabolic panel  - Hemoglobin A1c  - CBC with platelets and differential    Encounter for screening mammogram for breast cancer    - MA Screen Bilateral w/Xavi; Future    Personal history of nicotine dependence  Continues to smoke  - CT Chest Lung Cancer Scrn Low Dose wo; Future    Encounter for screening for other viral diseases  - Hepatitis B Surface Antibody; Future  - Hepatitis B Surface Antibody    Abnormal finding of blood chemistry, unspecified    - Hemoglobin A1c; Future  - Albumin Random Urine Quantitative with Creat Ratio; Future  - Hemoglobin A1c  - Albumin Random Urine Quantitative with Creat Ratio    Prediabetes    - TSH; Future  - TSH    Overall though doing well she did have some weight loss and failure to thrive in the past investigation did not really find any cause.  Consider ultrasound next year.  Of the liver        Counseling  Appropriate preventive services were addressed with this patient via screening, questionnaire, or discussion as appropriate for fall prevention, nutrition, physical activity, Tobacco-use cessation, weight loss and cognition.  Checklist reviewing preventive services available has been given to the patient.  Reviewed patient's diet, addressing concerns and/or questions.   She is at risk for lack of exercise and has been provided with information to increase physical activity for the benefit of her well-being.   The patient was instructed to see the dentist every 6 months.   She is at risk for psychosocial distress and has been provided with information to reduce risk.   Information on urinary incontinence and treatment options given to patient.           Lindsay Pepper is a 69 year old, presenting for the following:  Annual Visit (Right hip and groin pain. The pain moved into back and radiated down her leg. She is concerned about high blood pressure.)        8/8/2024     9:58 AM   Additional Questions   Roomed by [facility]  Directive  Patient does not have a Health Care Directive or Living Will: Discussed advance care planning with patient; information given to patient to review.    HPI              8/7/2024   General Health   How would you rate your overall physical health? Good   Feel stress (tense, anxious, or unable to sleep) Only a little      (!) STRESS CONCERN      8/7/2024   Nutrition   Diet: Regular (no restrictions)            8/7/2024   Exercise   Days per week of moderate/strenous exercise 2 days   Average minutes spent exercising at this level 30 min      (!) EXERCISE CONCERN      8/7/2024   Social Factors   Frequency of gathering with friends or relatives More than three times a week   Worry food won't last until get money to buy more Yes   Food not last or not have enough money for food? Yes   Do you have housing? (Housing is defined as stable permanent housing and does not include staying ouside in a car, in a tent, in an abandoned building, in an overnight shelter, or couch-surfing.) Yes   Are you worried about losing your housing? Yes   Lack of transportation? No   Unable to get utilities (heat,electricity)? No   Want help with housing or utility concern? No      (!) FOOD SECURITY CONCERN PRESENT(!) HOUSING CONCERN PRESENT      8/7/2024   Fall Risk   Fallen 2 or more times in the past year? No    No   Trouble with walking or balance? No    No       Multiple values from one day are sorted in reverse-chronological order          8/7/2024   Activities of Daily Living- Home Safety   Needs help with the following daily activites None of the above   Safety concerns in the home None of the above            8/7/2024   Dental   Dentist two times every year? (!) NO            8/7/2024   Hearing Screening   Hearing concerns? None of the above            8/7/2024   Driving Risk Screening   Patient/family members have concerns about driving No            8/7/2024   General Alertness/Fatigue Screening   Have you been more tired  than usual lately? No            8/7/2024   Urinary Incontinence Screening   Bothered by leaking urine in past 6 months Yes            8/7/2024   TB Screening   Were you born outside of the [place]? No            Today's PHQ-2 Score:       8/7/2024    10:29 AM   PHQ-2 ( 1999 [employer])   Q1: Little interest or pleasure in doing things 0   Q2: Feeling down, depressed or hopeless 0   PHQ-2 Score 0   Q1: Little interest or pleasure in doing things Not at all   Q2: Feeling down, depressed or hopeless Not at all   PHQ-2 Score 0           8/7/2024   Substance Use   Alcohol more than 3/day or more than 7/wk No   Do you have a current opioid prescription? No   How severe/bad is pain from 1 to 10? 0/10 (No Pain)   Do you use any other substances recreationally? No        Social History     Tobacco Use    Smoking status: Every Day     Current packs/day: 0.50     Average packs/day: 0.5 packs/day for 50.0 years (25.0 ttl pk-yrs)     Types: Cigarettes    Smokeless tobacco: Never   Vaping Use    Vaping status: Never Used   Substance Use Topics    Alcohol use: Yes     Comment: social    Drug use: Never           7/14/2023   LAST FHS-7 RESULTS   1st degree relative breast or ovarian cancer No   Any relative bilateral breast cancer No   Any male have breast cancer No   Any ONE woman have BOTH breast AND ovarian cancer No   Any woman with breast cancer before 50yrs No   2 or more relatives with breast AND/OR ovarian cancer No   2 or more relatives with breast AND/OR bowel cancer No               ASCVD Risk   The ASCVD Risk score (Ciara DK, et al., 2019) failed to calculate for the following reasons:    The valid HDL cholesterol range is 20 to 100 mg/dL            Reviewed and updated as needed this visit by Provider                      Current providers sharing in care for this patient include:  Patient Care Team:  Brigida Godinez MD as PCP - General  Brigida Godinez MD as Assigned PCP  Yudi Plascencia RN as Lead Care Coordinator  "(Primary Care - CC)  Alvaro Joya MD as MD (Gastroenterology)  Alvaro Joya MD as Assigned Gastroenterology Provider    The following health maintenance items are reviewed in Epic and correct as of today:  Health Maintenance   Topic Date Due    ZOSTER IMMUNIZATION (1 of 2) Never done    HEPATITIS B IMMUNIZATION (2 of 3 - Risk 3-dose series) 03/25/2021    HEPATITIS A IMMUNIZATION (2 of 2 - Risk 2-dose series) 08/25/2021    COVID-19 Vaccine (8 - 2023-24 season) 06/12/2024    LIPID  07/13/2024    ANNUAL REVIEW OF HM ORDERS  07/13/2024    LUNG CANCER SCREENING  07/25/2024    MEDICARE ANNUAL WELLNESS VISIT  07/13/2024    INFLUENZA VACCINE (1) 09/01/2024    MAMMO SCREENING  07/14/2025    NICOTINE/TOBACCO CESSATION COUNSELING Q 1 YR  07/16/2025    FALL RISK ASSESSMENT  08/08/2025    DTAP/TDAP/TD IMMUNIZATION (3 - Td or Tdap) 11/13/2025    GLUCOSE  07/13/2026    ADVANCE CARE PLANNING  07/16/2028    COLORECTAL CANCER SCREENING  12/29/2030    DEXA  01/21/2036    PHQ-2 (once per calendar year)  Completed    Pneumococcal Vaccine: 65+ Years  Completed    RSV VACCINE (Pregnancy & 60+)  Completed    IPV IMMUNIZATION  Aged Out    HPV IMMUNIZATION  Aged Out    MENINGITIS IMMUNIZATION  Aged Out    RSV MONOCLONAL ANTIBODY  Aged Out            Objective    Exam  BP (!) 143/88   Pulse 75   Temp 98.7  F (37.1  C) (Tympanic)   Resp 13   Ht 1.575 m (5' 2.01\")   Wt 48.1 kg (106 lb)   LMP  (LMP Unknown)   SpO2 98%   BMI 19.38 kg/m     Estimated body mass index is 19.38 kg/m  as calculated from the following:    Height as of this encounter: 1.575 m (5' 2.01\").    Weight as of this encounter: 48.1 kg (106 lb).    Physical Exam  GENERAL: alert and no distress  NECK: no adenopathy, no asymmetry, masses, or scars  RESP: lungs clear to auscultation - no rales, rhonchi or wheezes  CV: regular rate and rhythm, normal S1 S2, no S3 or S4, no murmur, click or rub, no peripheral edema  ABDOMEN: soft, nontender, no " hepatosplenomegaly, no masses and bowel sounds normal  MS: no gross musculoskeletal defects noted, no edema        8/8/2024   Mini Cog   Clock Draw Score 2 Normal   3 Item Recall 3 objects recalled   Mini Cog Total Score 5                 Signed Electronically by: Brigida Godinez MD

## 2024-08-10 LAB
HCV RNA SERPL NAA+PROBE-ACNC: ABNORMAL IU/ML
HCV RNA SERPL NAA+PROBE-LOG IU: 6.2 {LOG_IU}/ML

## 2024-08-15 ENCOUNTER — MYC REFILL (OUTPATIENT)
Dept: INTERNAL MEDICINE | Facility: CLINIC | Age: 69
End: 2024-08-15
Payer: COMMERCIAL

## 2024-08-15 DIAGNOSIS — I10 BENIGN ESSENTIAL HTN: ICD-10-CM

## 2024-08-15 RX ORDER — AMLODIPINE BESYLATE 2.5 MG/1
2.5 TABLET ORAL DAILY
Qty: 90 TABLET | Refills: 3 | OUTPATIENT
Start: 2024-08-15

## 2024-08-17 LAB — HCV GENTYP SERPL NAA+PROBE: NORMAL

## 2024-08-22 ENCOUNTER — ANCILLARY PROCEDURE (OUTPATIENT)
Dept: CT IMAGING | Facility: CLINIC | Age: 69
End: 2024-08-22
Attending: INTERNAL MEDICINE
Payer: COMMERCIAL

## 2024-08-22 DIAGNOSIS — Z87.891 PERSONAL HISTORY OF NICOTINE DEPENDENCE: ICD-10-CM

## 2024-08-22 PROCEDURE — 71271 CT THORAX LUNG CANCER SCR C-: CPT | Mod: GC | Performed by: RADIOLOGY

## 2024-08-28 DIAGNOSIS — M81.0 AGE-RELATED OSTEOPOROSIS WITHOUT CURRENT PATHOLOGICAL FRACTURE: ICD-10-CM

## 2024-08-29 ENCOUNTER — MYC REFILL (OUTPATIENT)
Dept: INTERNAL MEDICINE | Facility: CLINIC | Age: 69
End: 2024-08-29
Payer: COMMERCIAL

## 2024-08-29 DIAGNOSIS — M81.0 AGE-RELATED OSTEOPOROSIS WITHOUT CURRENT PATHOLOGICAL FRACTURE: ICD-10-CM

## 2024-08-29 RX ORDER — ALENDRONATE SODIUM 70 MG/1
70 TABLET ORAL
Qty: 12 TABLET | Refills: 0 | Status: SHIPPED | OUTPATIENT
Start: 2024-08-29

## 2024-08-29 RX ORDER — ALENDRONATE SODIUM 70 MG/1
70 TABLET ORAL
Qty: 12 TABLET | Refills: 3 | OUTPATIENT
Start: 2024-08-29

## 2024-10-26 ENCOUNTER — MYC REFILL (OUTPATIENT)
Dept: INTERNAL MEDICINE | Facility: CLINIC | Age: 69
End: 2024-10-26
Payer: COMMERCIAL

## 2024-10-26 DIAGNOSIS — I25.10 ASCVD (ARTERIOSCLEROTIC CARDIOVASCULAR DISEASE): ICD-10-CM

## 2024-10-26 DIAGNOSIS — M81.0 AGE-RELATED OSTEOPOROSIS WITHOUT CURRENT PATHOLOGICAL FRACTURE: ICD-10-CM

## 2024-10-28 RX ORDER — ALENDRONATE SODIUM 70 MG/1
70 TABLET ORAL
Qty: 12 TABLET | Refills: 0 | Status: SHIPPED | OUTPATIENT
Start: 2024-10-28

## 2024-10-28 RX ORDER — ATORVASTATIN CALCIUM 10 MG/1
10 TABLET, FILM COATED ORAL DAILY
Qty: 90 TABLET | Refills: 2 | Status: SHIPPED | OUTPATIENT
Start: 2024-10-28

## 2024-10-28 RX ORDER — ATORVASTATIN CALCIUM 10 MG/1
10 TABLET, FILM COATED ORAL DAILY
Qty: 90 TABLET | Refills: 0 | OUTPATIENT
Start: 2024-10-28

## 2024-11-04 ENCOUNTER — MYC REFILL (OUTPATIENT)
Dept: INTERNAL MEDICINE | Facility: CLINIC | Age: 69
End: 2024-11-04
Payer: COMMERCIAL

## 2024-11-04 DIAGNOSIS — M81.0 AGE-RELATED OSTEOPOROSIS WITHOUT CURRENT PATHOLOGICAL FRACTURE: ICD-10-CM

## 2024-11-04 RX ORDER — ALENDRONATE SODIUM 70 MG/1
70 TABLET ORAL
Qty: 12 TABLET | Refills: 0 | OUTPATIENT
Start: 2024-11-04

## 2024-11-11 DIAGNOSIS — I10 BENIGN ESSENTIAL HTN: ICD-10-CM

## 2024-11-11 RX ORDER — AMLODIPINE BESYLATE 2.5 MG/1
2.5 TABLET ORAL DAILY
Qty: 90 TABLET | Refills: 3 | Status: SHIPPED | OUTPATIENT
Start: 2024-11-11

## 2024-11-13 ENCOUNTER — MYC MEDICAL ADVICE (OUTPATIENT)
Dept: INTERNAL MEDICINE | Facility: CLINIC | Age: 69
End: 2024-11-13
Payer: COMMERCIAL

## 2024-11-14 ENCOUNTER — NURSE TRIAGE (OUTPATIENT)
Dept: INTERNAL MEDICINE | Facility: CLINIC | Age: 69
End: 2024-11-14
Payer: COMMERCIAL

## 2024-11-14 NOTE — TELEPHONE ENCOUNTER
She can take either pepcid OTC or peptobismol ( which works for everything )   I do have availabilty tomorrow she can come in if pain is severe

## 2024-11-14 NOTE — TELEPHONE ENCOUNTER
Attempted to contact patient to relay information below from Dr Godinez. No answer. Left message with instructions below and to call clinic with further questions.

## 2024-11-14 NOTE — TELEPHONE ENCOUNTER
"Nurse Triage SBAR    Is this a 2nd Level Triage? YES, LICENSED PRACTITIONER REVIEW IS REQUIRED    Situation: Abdominal pain    Background: states this started 3 days ago    Assessment: States this started when she took a tablet of fexofenadine 3 days ago on an empty stomach. States she felt a pain on her left side and vomited. States the vomiting has resolved but the pain has moved more towards her stomach. Rates 6/10 pain and states she feels like her stomach is irritated. Denies vomiting blood, blood in stool, fever, nausea, weakness or SOB. States she has been eating bland foods and her symptoms are slowly improving but is unsure if she can take something to help.    Protocol Recommended Disposition:   See in Office Today    Recommendation: States she wonders if there is something to \"calm her stomach down.\" States this did happen once before when she was younger, but was not given anything and had it resolve on its own. Discussed to continue bland diet and a message will be sent to Dr Godinez to see if there are any other recommendations.    Routed to provider    Does the patient meet one of the following criteria for ADS visit consideration? 16+ years old, with an [facility] PCP     TIP  Providers, please consider if this condition is appropriate for management at one of our Acute and Diagnostic Services sites.     If patient is a good candidate, please use dotphrase <dot>triageresponse and select Refer to ADS to document.      Reason for Disposition   Age > 60 years    Additional Information   Negative: Unusual vaginal discharge   Negative: Vomiting and abdomen looks much more swollen than usual   Negative: White of the eyes have turned yellow (i.e., jaundice)   Negative: Blood in urine (red, pink, or tea-colored)   Negative: Fever > 103 F (39.4 C)   Negative: Fever > 101 F (38.3 C) and over 60 years of age   Negative: Fever > 100.0 F (37.8 C) and has diabetes mellitus or a weak immune system (e.g., HIV positive, " "cancer chemotherapy, organ transplant, splenectomy, chronic steroids)   Negative: Fever > 100.0 F (37.8 C) and bedridden (e.g., CVA, chronic illness, recovering from surgery)   Negative: Pregnant or could be pregnant (i.e., missed last menstrual period)   Negative: MODERATE pain (e.g., interferes with normal activities that comes and goes (cramps) lasts > 24 hours  (Exception: Pain with Vomiting or Diarrhea - see that Protocol.)   Negative: MILD TO MODERATE constant pain lasting > 2 hours   Negative: Vomiting bile (green color)   Negative: Patient sounds very sick or weak to the triager   Negative: SEVERE abdominal pain (e.g., excruciating)   Negative: Vomiting red blood or black (coffee ground) material   Negative: Blood in bowel movements  (Exception: Blood on surface of BM with constipation.)   Negative: Black or tarry bowel movements  (Exception: Chronic-unchanged black-grey BMs AND is taking iron pills or Pepto-Bismol.)   Negative: Followed an abdomen (stomach) injury   Negative: Chest pain   Negative: Abdominal pain and pregnant < 20 weeks   Negative: Abdominal pain and pregnant 20 or more weeks   Negative: Pain is mainly in upper abdomen (if needed ask: 'is it mainly above the belly button?')   Negative: Abdomen bloating or swelling are main symptoms   Negative: Passed out (i.e., fainted, collapsed and was not responding)   Negative: Shock suspected (e.g., cold/pale/clammy skin, too weak to stand, low BP, rapid pulse)   Negative: Sounds like a life-threatening emergency to the triager    Answer Assessment - Initial Assessment Questions  1. LOCATION: \"Where does it hurt?\"       Left side and stomach  2. RADIATION: \"Does the pain shoot anywhere else?\" (e.g., chest, back)      Denies   3. ONSET: \"When did the pain begin?\" (e.g., minutes, hours or days ago)       3 days ago  4. SUDDEN: \"Gradual or sudden onset?\"      Sudden   5. PATTERN \"Does the pain come and go, or is it constant?\"     - If it comes and goes: " "\"How long does it last?\" \"Do you have pain now?\"      (Note: Comes and goes means the pain is intermittent. It goes away completely between bouts.)     - If constant: \"Is it getting better, staying the same, or getting worse?\"       (Note: Constant means the pain never goes away completely; most serious pain is constant and gets worse.)       Comes and goes   6. SEVERITY: \"How bad is the pain?\"  (e.g., Scale 1-10; mild, moderate, or severe)     - MILD (1-3): Doesn't interfere with normal activities, abdomen soft and not tender to touch.      - MODERATE (4-7): Interferes with normal activities or awakens from sleep, abdomen tender to touch.      - SEVERE (8-10): Excruciating pain, doubled over, unable to do any normal activities.        6/10  7. RECURRENT SYMPTOM: \"Have you ever had this type of stomach pain before?\" If Yes, ask: \"When was the last time?\" and \"What happened that time?\"       \"When I was younger\"  8. CAUSE: \"What do you think is causing the stomach pain?\"      Did not eat enough when taking fexofenadine   9. RELIEVING/AGGRAVATING FACTORS: \"What makes it better or worse?\" (e.g., antacids, bending or twisting motion, bowel movement)      Eating bland foods helps,   10. OTHER SYMPTOMS: \"Do you have any other symptoms?\" (e.g., back pain, diarrhea, fever, urination pain, vomiting)        Vomiting   11. PREGNANCY: \"Is there any chance you are pregnant?\" \"When was your last menstrual period?\"        N/A    Protocols used: Abdominal Pain - Female-A-[place]    "

## 2024-11-20 ENCOUNTER — OFFICE VISIT (OUTPATIENT)
Dept: INTERNAL MEDICINE | Facility: CLINIC | Age: 69
End: 2024-11-20
Payer: COMMERCIAL

## 2024-11-20 VITALS
DIASTOLIC BLOOD PRESSURE: 89 MMHG | SYSTOLIC BLOOD PRESSURE: 143 MMHG | OXYGEN SATURATION: 100 % | TEMPERATURE: 100.9 F | RESPIRATION RATE: 23 BRPM | HEART RATE: 121 BPM

## 2024-11-20 DIAGNOSIS — R50.9 FEVER, UNSPECIFIED FEVER CAUSE: ICD-10-CM

## 2024-11-20 DIAGNOSIS — D72.829 LEUKOCYTOSIS, UNSPECIFIED TYPE: Primary | ICD-10-CM

## 2024-11-20 RX ORDER — PHENAZOPYRIDINE HYDROCHLORIDE 200 MG/1
100 TABLET, FILM COATED ORAL 3 TIMES DAILY PRN
COMMUNITY
Start: 2024-11-19 | End: 2024-11-21

## 2024-11-20 RX ORDER — CEPHALEXIN 500 MG/1
500 CAPSULE ORAL 2 TIMES DAILY
COMMUNITY
Start: 2024-11-19 | End: 2024-11-24

## 2024-11-20 NOTE — NURSING NOTE
Shantelle Taveras arrives in clinic today, during her intake process she reports feeling nausea, dizzy and mild shortness of breathe.    Patient was seen in [facility] yesterday for Acute Cystitis and  possible Ureteral stone. She was discharged with prescriptions for Keflex 500 mg bid and Pyridium 100 tid for 2 days. Patient reports that she has not been taking her Abx's with food.

## 2024-11-20 NOTE — PROGRESS NOTES
Assessment & Plan     Leukocytosis, unspecified type  Patient is febrile , a little disoriented , and tachypneic , other that sinus tachycardia she has a non focal exam    No asterxis although suspicion for heaptic encephalopathy is high    She has preexisting chronic active hepatitis C but due to on going intermittent  use of alcohol has not been able to get treatment   She denies recent alcohol use   Also has COPD , malnourished and ongoing tobacco use   White count is disproportionately high to the level of pyuria   Concerning for bacteremia  Also nausea , inability to eat today means she will not be able to keep oral antibiotics down   Fever, unspecified fever cause  Will need IV antibiotic , stat blood cultures , hepatic labs procalcitonin and IV hydration   She is unable to keep oral antibiotics down         Patient declined to go to ADS and due to symptoms return to ER         Subjective   Shantelle is a 69 year old, presenting for the following health issues:  OFFICE VISIT (Patient reports they are here with back ache on left side. Would like flu and covid vaccines today. Says her stomach started bother her, went away, and came back a few days ago. Went to ER and they said she passed a gallstone. Has been vomiting since and hasn't been able to keep anything down. )    HPI                   Objective    BP (!) 143/89   Pulse (!) 121   Temp (!) 100.9  F (38.3  C) (Tympanic)   Resp 23   LMP  (LMP Unknown)   SpO2 100%   There is no height or weight on file to calculate BMI.  Physical Exam   GENERAL: alert and no distress  NECK: no adenopathy, no asymmetry, masses, or scars  RESP: lungs clear to auscultation - no rales, rhonchi or wheezes  CV: regular rate and rhythm, normal S1 S2, no S3 or S4, no murmur, click or rub, no peripheral edema  ABDOMEN: soft, nontender, no hepatosplenomegaly, no masses and bowel sounds normal  MS: no gross musculoskeletal defects noted, no edema            Signed Electronically  by: Brigida Godinez MD

## 2024-11-21 ENCOUNTER — PATIENT OUTREACH (OUTPATIENT)
Dept: GERIATRIC MEDICINE | Facility: CLINIC | Age: 69
End: 2024-11-21
Payer: COMMERCIAL

## 2024-11-22 NOTE — PROGRESS NOTES
TRANSITIONS OF CARE (MAILE) LOG    MAILE tasks should be completed by the CC within one (1) business day of notification of each transition. Follow up contact with member is required after return to their usual care setting.  Note:  If CC finds out about the transitions fifteen (15) days or more after the member has returned to their usual care setting, no MAILE log is needed. However, the CC should check in with the member to discuss the transition process, any changes needed to the care plan and document it in a case note.     Member Name:  Shantelle Taveras [facility] Name:  [employer] Member ID#: [id number]   Product: [facility] Care Coordinator Contact:  Leigh Ann Christiansen RN Agency/County/Care System: [facility]   Transition Communication Actions from Care Management Contact   Transition #1   Notification Date: 11/20/24 Transition Date: 11/20/21 Transition From: Home     Is this the member s usual care setting?               Yes Transition To: Hospital, [facility]   Transition Type:  Unplanned    Documentation from conversation with the member/responsible party, provider, discharging and receiving facility:   Date: 11/21/24: Received notification of admission to hospital with dx of Cystitis  CC contacted Hospital /discharge plannerSheridan and left message with this CC contact information.CC reached out to daughter Elenita regarding transition and discussed transition; no questions at this time.  Reviewed and update support plan as needed.  Notified community service providers and placed services None on hold as needed.  Transition log initiated.   PCP, Brigida Godinez, notified of hospitalization via EMR.     Transition #2   Notification Date: 11/24/244 Transition Date: 11/22/24 Transition From: Hospital, [facility]     Is this the member s usual care setting?               no Transition To: Home   Transition Type:  Planned    Documentation from conversation with the member/responsible party,  provider, discharging and receiving facility:   Date: 11/24/24: Received notification of transition to home.  CC contacted adult daughter Elenita  and reviewed discharge summary.  Member has a follow-up appointment with PCP in 7 days: No: Offered Assistance with setting up a follow up appointment   Member has had a change in condition: No  Home visit needed: No  Support Plan reviewed and updated.  The following home based services None were resumed.  New referrals placed: No  Transition log completed.   PCP, Brigida Godinez, notified of transition back to home via EMR.    Leigh Ann Christiansen RN  (for Yudi Plascencia)  [facility]  [phone]         *RETURN TO USUAL CARE SETTING: *Complete tasks below when the member is discharging TO their usual care setting within one (1) business day of notification..      For situations where the Care Coordinator is notified of the discharge prior to the date of discharge, the Care Coordinator must follow up with the member or designated representative to confirm that discharge actually occurred and discuss required MAILE tasks as outlined in the MAILE Instructions.  (This includes situations where it may be a  new  usual care setting for the member. (i.e., a community member who decides upon permanent nursing home placement following hospitalization and rehab).    Discuss with Member/Responsible Party:    Check  Yes  - if the member, family member and/or SNF/facility staff manages the following:    If  No  provide explanation in the comments section.          Date completed: 11/24/24 Communicated with member or their designated representative about the following:  care transition process; about changes to the member s health status; plan of care updates; education about transitions and how to prevent unplanned transitions/readmissions    Four Pillars for Optimal Transition:    Check  Yes  - if the member, family member and/or SNF/facility staff manages the following:    If  No  provide  explanation in the comments section.          []  Yes     [x]  No Does the member have a follow-up appointment scheduled with primary care or specialist? (Mental health hospitalizations--the appt. should be w/in 7 days)              For mental health hospitalizations:  []  Yes     []  No     Does the member have a follow-up appointment scheduled with a mental health practitioner within 7 days of discharge?  [x]  Yes     []  No     Has a medication review been completed with member? If no, refer to PCP, home care nurse, MTM, pharmacist  [x]  Yes     []  No     Can the member manage their medications or is there a system in place to manage medications (e.g. home care set-up)?         [x]  Yes     []  No     Can the member verbalize warning signs and symptoms to watch for and how to respond?  [x]  Yes     []  No     Does the member have a copy of and understand their discharge instructions?  If no, assist to obtain copy of discharge instructions, review discharge instructions, and assist to contact PCP to discuss questions about their recent hospitalization.  [x]  Yes     []  No     Does the member have adequate food, housing and transportation?  If no, add goal and discuss additional supports available to the member                                                                                                                                                                                 [x]  Yes     []  No     Is the member safe in their home?  If no, document needs and support provided                                                                                                                                                                          []  Yes     [x]  No     Are there any concerns of vulnerability, abuse, or neglect?  If yes, document concerns and actions taken by Care Coordinator as a mandated                                                                                                                                                                               [x]  Yes     []  No     Does the member use a Personal Health Care Record?  Check  Yes  if visit summary, discharge summary, and/or healthcare summary are being used as a PHR.                                                                                                                                                                                  [x]  Yes     []  No     Have you reviewed the discharge summary with the member? If  No  provide explanation in comments.  [x]  Yes     []  No     Have you updated the member s care plan/support plan? Add new diagnosis, medications, treatments, goals & interventions, as applicable. If No, provide explanation in comments.    Comments:  Discharged home with daughter whom she lives with.  Is resting comfortably.  Has discharge medications.  No concerns at this time.         Leigh Ann Christiansen RN  [facility]  [phone]

## 2025-01-13 ENCOUNTER — OFFICE VISIT (OUTPATIENT)
Dept: INTERNAL MEDICINE | Facility: CLINIC | Age: 70
End: 2025-01-13
Payer: COMMERCIAL

## 2025-01-13 VITALS
HEART RATE: 76 BPM | SYSTOLIC BLOOD PRESSURE: 110 MMHG | DIASTOLIC BLOOD PRESSURE: 68 MMHG | HEIGHT: 62 IN | OXYGEN SATURATION: 96 % | TEMPERATURE: 99.7 F | RESPIRATION RATE: 24 BRPM | WEIGHT: 105.5 LBS | BODY MASS INDEX: 19.41 KG/M2

## 2025-01-13 DIAGNOSIS — Z12.31 ENCOUNTER FOR SCREENING MAMMOGRAM FOR BREAST CANCER: ICD-10-CM

## 2025-01-13 DIAGNOSIS — M81.0 OSTEOPOROSIS, UNSPECIFIED OSTEOPOROSIS TYPE, UNSPECIFIED PATHOLOGICAL FRACTURE PRESENCE: ICD-10-CM

## 2025-01-13 DIAGNOSIS — B18.2 CHRONIC HEPATITIS C WITHOUT HEPATIC COMA (H): ICD-10-CM

## 2025-01-13 DIAGNOSIS — J44.9 CHRONIC OBSTRUCTIVE PULMONARY DISEASE, UNSPECIFIED COPD TYPE (H): Primary | ICD-10-CM

## 2025-01-13 DIAGNOSIS — Z23 NEED FOR SHINGLES VACCINE: ICD-10-CM

## 2025-01-13 DIAGNOSIS — F10.21 H/O ALCOHOL DEPENDENCE (H): ICD-10-CM

## 2025-01-13 DIAGNOSIS — N10 PYELONEPHRITIS, ACUTE: ICD-10-CM

## 2025-01-13 LAB
ALBUMIN SERPL BCG-MCNC: 4.2 G/DL (ref 3.5–5.2)
ALBUMIN UR-MCNC: NEGATIVE MG/DL
ALP SERPL-CCNC: 60 U/L (ref 40–150)
ALT SERPL W P-5'-P-CCNC: 40 U/L (ref 0–50)
ANION GAP SERPL CALCULATED.3IONS-SCNC: 9 MMOL/L (ref 7–15)
APPEARANCE UR: CLEAR
AST SERPL W P-5'-P-CCNC: 46 U/L (ref 0–45)
BACTERIA #/AREA URNS HPF: ABNORMAL /HPF
BILIRUB SERPL-MCNC: 0.5 MG/DL
BILIRUB UR QL STRIP: NEGATIVE
BUN SERPL-MCNC: 12 MG/DL (ref 8–23)
CALCIUM SERPL-MCNC: 9.7 MG/DL (ref 8.8–10.4)
CHLORIDE SERPL-SCNC: 103 MMOL/L (ref 98–107)
COLOR UR AUTO: YELLOW
CREAT SERPL-MCNC: 0.99 MG/DL (ref 0.51–0.95)
EGFRCR SERPLBLD CKD-EPI 2021: 61 ML/MIN/1.73M2
ERYTHROCYTE [DISTWIDTH] IN BLOOD BY AUTOMATED COUNT: 13 % (ref 10–15)
GLUCOSE SERPL-MCNC: 77 MG/DL (ref 70–99)
GLUCOSE UR STRIP-MCNC: NEGATIVE MG/DL
HCO3 SERPL-SCNC: 28 MMOL/L (ref 22–29)
HCT VFR BLD AUTO: 42.8 % (ref 35–47)
HGB BLD-MCNC: 14.7 G/DL (ref 11.7–15.7)
HGB UR QL STRIP: ABNORMAL
KETONES UR STRIP-MCNC: NEGATIVE MG/DL
LEUKOCYTE ESTERASE UR QL STRIP: ABNORMAL
MCH RBC QN AUTO: 33.1 PG (ref 26.5–33)
MCHC RBC AUTO-ENTMCNC: 34.3 G/DL (ref 31.5–36.5)
MCV RBC AUTO: 96 FL (ref 78–100)
MUCOUS THREADS #/AREA URNS LPF: PRESENT /LPF
NITRATE UR QL: NEGATIVE
PH UR STRIP: 6.5 [PH] (ref 5–8)
PLATELET # BLD AUTO: 188 10E3/UL (ref 150–450)
POTASSIUM SERPL-SCNC: 3.9 MMOL/L (ref 3.4–5.3)
PROT SERPL-MCNC: 7.8 G/DL (ref 6.4–8.3)
RBC # BLD AUTO: 4.44 10E6/UL (ref 3.8–5.2)
RBC #/AREA URNS AUTO: ABNORMAL /HPF
SODIUM SERPL-SCNC: 140 MMOL/L (ref 135–145)
SP GR UR STRIP: 1.02 (ref 1–1.03)
SQUAMOUS #/AREA URNS AUTO: ABNORMAL /LPF
UROBILINOGEN UR STRIP-ACNC: 0.2 E.U./DL
WBC # BLD AUTO: 8.3 10E3/UL (ref 4–11)
WBC #/AREA URNS AUTO: ABNORMAL /HPF
WBC CLUMPS #/AREA URNS HPF: PRESENT /HPF

## 2025-01-13 PROCEDURE — 36415 COLL VENOUS BLD VENIPUNCTURE: CPT | Performed by: INTERNAL MEDICINE

## 2025-01-13 PROCEDURE — 81001 URINALYSIS AUTO W/SCOPE: CPT | Performed by: INTERNAL MEDICINE

## 2025-01-13 PROCEDURE — 90480 ADMN SARSCOV2 VAC 1/ONLY CMP: CPT | Performed by: INTERNAL MEDICINE

## 2025-01-13 PROCEDURE — G2211 COMPLEX E/M VISIT ADD ON: HCPCS | Performed by: INTERNAL MEDICINE

## 2025-01-13 PROCEDURE — 80053 COMPREHEN METABOLIC PANEL: CPT | Performed by: INTERNAL MEDICINE

## 2025-01-13 PROCEDURE — 85027 COMPLETE CBC AUTOMATED: CPT | Performed by: INTERNAL MEDICINE

## 2025-01-13 PROCEDURE — 91320 SARSCV2 VAC 30MCG TRS-SUC IM: CPT | Performed by: INTERNAL MEDICINE

## 2025-01-13 PROCEDURE — 99214 OFFICE O/P EST MOD 30 MIN: CPT | Performed by: INTERNAL MEDICINE

## 2025-01-13 ASSESSMENT — PAIN SCALES - GENERAL: PAINLEVEL_OUTOF10: NO PAIN (0)

## 2025-01-13 NOTE — PROGRESS NOTES
Assessment & Plan     Chronic obstructive pulmonary disease, unspecified COPD type (H)  Stable not requiring inhalers   - zoster vaccine recombinant adjuvanted (SHINGRIX) injection; Inject 0.5 mLs into the muscle once for 1 dose. Pharmacist administered    Pyelonephritis, acute  Recovered after being treated with IV antibiotics and completed the whole course.  She has no symptoms right now.  CT scan did show mild left hydronephrosis with no obstructive ureteric stone seen she has 2 tiny nonobstructive stones in the kidney.  Will repeat urine today  - UA Macroscopic with reflex to Microscopic and Culture - Lab Collect; Future  - CBC with platelets; Future  - Comprehensive metabolic panel; Future  - zoster vaccine recombinant adjuvanted (SHINGRIX) injection; Inject 0.5 mLs into the muscle once for 1 dose. Pharmacist administered  - UA Macroscopic with reflex to Microscopic and Culture - Lab Collect  - CBC with platelets  - Comprehensive metabolic panel  - UA Microscopic with Reflex to Culture    Need for shingles vaccine    Osteoporosis, unspecified osteoporosis type, unspecified pathological fracture presence  She is taking Fosamax we will recheck bone density to see improvement  - DX Bone Density; Future    Encounter for screening mammogram for breast cancer  Reminded her to get her mammogram  - MA Screen Bilateral w/Xavi; Future    H/O alcohol dependence (H)  Advised complete abstinence she did drink some during the winter break.  Advised of being off alcohol and for 4 months    Chronic hepatitis C without hepatic coma (H)  Imaging does not show cirrhosis liver function tests are almost normal.  She could meet criteria for treatment based on RNA levels alone recheck in 4 months  She would prefer that I prescribe medications and not go to see GI      MED REC REQUIRED  Post Medication Reconciliation Status: discharge medications reconciled, continue medications without change        Lindsay   Shantelle is a 69 year  "old, presenting for the following health issues:  Hospital F/U (Patient here for hospital follow up)      1/13/2025    11:42 AM   Additional Questions   Roomed by Juana BUSBY RN   Accompanied by self         1/13/2025    11:42 AM   Patient Reported Additional Medications   Patient reports taking the following new medications none     HPI                   Objective    Pulse 76   Temp 99.7  F (37.6  C) (Tympanic)   Resp 24   Ht 1.575 m (5' 2\")   Wt 47.9 kg (105 lb 8 oz)   LMP  (LMP Unknown)   SpO2 96%   BMI 19.30 kg/m    Body mass index is 19.3 kg/m .  Physical Exam   GENERAL: alert and no distress  NECK: no adenopathy, no asymmetry, masses, or scars  RESP: lungs clear to auscultation - no rales, rhonchi or wheezes  CV: regular rate and rhythm, normal S1 S2, no S3 or S4, no murmur, click or rub, no peripheral edema  MS: no gross musculoskeletal defects noted, no edema            Signed Electronically by: Brigida Godinez MD    "

## 2025-01-14 ENCOUNTER — PATIENT OUTREACH (OUTPATIENT)
Dept: CARE COORDINATION | Facility: CLINIC | Age: 70
End: 2025-01-14
Payer: COMMERCIAL

## 2025-01-16 ENCOUNTER — ANCILLARY PROCEDURE (OUTPATIENT)
Dept: MAMMOGRAPHY | Facility: CLINIC | Age: 70
End: 2025-01-16
Attending: INTERNAL MEDICINE
Payer: COMMERCIAL

## 2025-01-16 DIAGNOSIS — Z12.31 ENCOUNTER FOR SCREENING MAMMOGRAM FOR BREAST CANCER: ICD-10-CM

## 2025-01-24 ENCOUNTER — ANCILLARY PROCEDURE (OUTPATIENT)
Dept: BONE DENSITY | Facility: CLINIC | Age: 70
End: 2025-01-24
Attending: INTERNAL MEDICINE
Payer: COMMERCIAL

## 2025-01-24 DIAGNOSIS — M81.0 OSTEOPOROSIS, UNSPECIFIED OSTEOPOROSIS TYPE, UNSPECIFIED PATHOLOGICAL FRACTURE PRESENCE: ICD-10-CM

## 2025-01-24 PROCEDURE — 77091 TBS TECHL CALCULATION ONLY: CPT | Performed by: PHYSICIAN ASSISTANT

## 2025-01-24 PROCEDURE — 77080 DXA BONE DENSITY AXIAL: CPT | Mod: TC | Performed by: PHYSICIAN ASSISTANT

## 2025-01-29 ENCOUNTER — MYC REFILL (OUTPATIENT)
Dept: INTERNAL MEDICINE | Facility: CLINIC | Age: 70
End: 2025-01-29
Payer: COMMERCIAL

## 2025-01-29 DIAGNOSIS — I10 BENIGN ESSENTIAL HTN: ICD-10-CM

## 2025-01-29 DIAGNOSIS — M81.0 AGE-RELATED OSTEOPOROSIS WITHOUT CURRENT PATHOLOGICAL FRACTURE: ICD-10-CM

## 2025-01-29 DIAGNOSIS — I25.10 ASCVD (ARTERIOSCLEROTIC CARDIOVASCULAR DISEASE): ICD-10-CM

## 2025-01-29 RX ORDER — ATORVASTATIN CALCIUM 10 MG/1
10 TABLET, FILM COATED ORAL DAILY
Qty: 90 TABLET | Refills: 2 | Status: SHIPPED | OUTPATIENT
Start: 2025-01-29

## 2025-01-29 RX ORDER — AMLODIPINE BESYLATE 2.5 MG/1
2.5 TABLET ORAL DAILY
Qty: 90 TABLET | Refills: 3 | Status: SHIPPED | OUTPATIENT
Start: 2025-01-29

## 2025-01-29 RX ORDER — ALENDRONATE SODIUM 70 MG/1
70 TABLET ORAL
Qty: 12 TABLET | Refills: 0 | Status: SHIPPED | OUTPATIENT
Start: 2025-01-29

## 2025-03-12 ENCOUNTER — PATIENT OUTREACH (OUTPATIENT)
Dept: GERIATRIC MEDICINE | Facility: CLINIC | Age: 70
End: 2025-03-12
Payer: COMMERCIAL

## 2025-03-12 NOTE — PROGRESS NOTES
[facility] Care Coordination Contact    CHW spoke with member to get updates on medical assistance renewal that is due by March. Member indicated that medical assistance renewal paperwork has been submitted and that the eligibility letter has kirit received. Member is up to date with Preventive Care.     LISA Bonner  [facility]  [phone]

## 2025-05-01 ENCOUNTER — PATIENT OUTREACH (OUTPATIENT)
Dept: GERIATRIC MEDICINE | Facility: CLINIC | Age: 70
End: 2025-05-01
Payer: COMMERCIAL

## 2025-05-01 NOTE — PROGRESS NOTES
[facility] Care Coordination Contact    Completed 4 attempts to reach client with no response.  Member is officially unable to contact effective today.  If open to elderly waiver complete DTR and MMIS entry as needed.  Completed health plan required [facility] POC.    Follow-up Plan: CC will attempt to reach member in six months.    Per member's previous request, an Unable to Reach letter will not be sent out.     This CC note routed to PCP, Brigida Godinez RN PHN  [facility]  [phone]

## 2025-05-27 ENCOUNTER — MYC REFILL (OUTPATIENT)
Dept: INTERNAL MEDICINE | Facility: CLINIC | Age: 70
End: 2025-05-27
Payer: COMMERCIAL

## 2025-05-27 DIAGNOSIS — I10 BENIGN ESSENTIAL HTN: ICD-10-CM

## 2025-05-27 DIAGNOSIS — M81.0 AGE-RELATED OSTEOPOROSIS WITHOUT CURRENT PATHOLOGICAL FRACTURE: ICD-10-CM

## 2025-05-27 RX ORDER — ALENDRONATE SODIUM 70 MG/1
70 TABLET ORAL
Qty: 12 TABLET | Refills: 0 | Status: SHIPPED | OUTPATIENT
Start: 2025-05-27

## 2025-05-28 ENCOUNTER — PATIENT OUTREACH (OUTPATIENT)
Dept: GERIATRIC MEDICINE | Facility: CLINIC | Age: 70
End: 2025-05-28
Payer: COMMERCIAL

## 2025-05-28 RX ORDER — ALENDRONATE SODIUM 70 MG/1
70 TABLET ORAL
Qty: 12 TABLET | Refills: 0 | OUTPATIENT
Start: 2025-05-28

## 2025-05-28 RX ORDER — AMLODIPINE BESYLATE 2.5 MG/1
2.5 TABLET ORAL DAILY
Qty: 90 TABLET | Refills: 3 | OUTPATIENT
Start: 2025-05-28

## 2025-05-28 NOTE — LETTER
May 28, 2025    Important [employer] Information    SHANTELLE HAMMOND  [address]  [place] [address]    Your New Care Coordinator  Dear Shantelle,  My name is Luisa Mae RN, BSN  and I am your new Care Coordinator. You may reach me by calling [phone]. I will be in touch with you shortly to address any questions you may have.   I have also been in contact with Yudi Plascencia RN, PHN, your previous care coordinator, to ensure a smooth transition.  Questions?  Call me at [phone] Monday-Friday between 8am and 5pm. TTY: [phone]. I look forward to working with you as a [employer]  member.  Sincerely,       Luisa Mae RN, BSN   [phone]  [email or web address]    cc: member records

## 2025-05-28 NOTE — PROGRESS NOTES
[facility] Care Coordination Contact    Internal CC change effective 6/1/2025.  Mailed member CC Change letter.  Additional tasks to be completed by CMS include: update database & EPIC, enter CC Change in MMIS, and move member file.        Bozena Tsai  Care Management Specialist   [facility]   [phone]

## 2025-06-13 ENCOUNTER — ANCILLARY PROCEDURE (OUTPATIENT)
Dept: GENERAL RADIOLOGY | Facility: CLINIC | Age: 70
End: 2025-06-13
Attending: INTERNAL MEDICINE
Payer: COMMERCIAL

## 2025-06-13 DIAGNOSIS — G89.29 CHRONIC RIGHT SHOULDER PAIN: ICD-10-CM

## 2025-06-13 DIAGNOSIS — M25.511 CHRONIC RIGHT SHOULDER PAIN: ICD-10-CM

## 2025-06-13 PROCEDURE — 73030 X-RAY EXAM OF SHOULDER: CPT | Mod: TC | Performed by: RADIOLOGY

## 2025-06-14 ENCOUNTER — RESULTS FOLLOW-UP (OUTPATIENT)
Dept: INTERNAL MEDICINE | Facility: CLINIC | Age: 70
End: 2025-06-14

## 2025-06-16 ENCOUNTER — PATIENT OUTREACH (OUTPATIENT)
Dept: CARE COORDINATION | Facility: CLINIC | Age: 70
End: 2025-06-16
Payer: COMMERCIAL

## 2025-06-18 ENCOUNTER — PATIENT OUTREACH (OUTPATIENT)
Dept: CARE COORDINATION | Facility: CLINIC | Age: 70
End: 2025-06-18
Payer: COMMERCIAL

## 2025-06-27 ENCOUNTER — MYC MEDICAL ADVICE (OUTPATIENT)
Dept: INTERNAL MEDICINE | Facility: CLINIC | Age: 70
End: 2025-06-27
Payer: COMMERCIAL

## 2025-06-27 DIAGNOSIS — B18.2 CHRONIC HEPATITIS C WITHOUT HEPATIC COMA (H): Primary | ICD-10-CM

## 2025-07-01 NOTE — TELEPHONE ENCOUNTER
I did send in mayvert to [facility] . Not sure why they have not informed her

## 2025-07-03 NOTE — TELEPHONE ENCOUNTER
Can we please be sure that it has been declined because I have not received the declined notification.  Please call pharmacy and double check thank you

## 2025-07-07 ENCOUNTER — TELEPHONE (OUTPATIENT)
Dept: INTERNAL MEDICINE | Facility: CLINIC | Age: 70
End: 2025-07-07
Payer: COMMERCIAL

## 2025-07-07 DIAGNOSIS — B18.2 CHRONIC HEPATITIS C WITHOUT HEPATIC COMA (H): ICD-10-CM

## 2025-07-07 NOTE — TELEPHONE ENCOUNTER
Please start PA for:      Disp Refills Start End DRAKE   glecaprevir-pibrentasvir (MAVYRET) 100-40 MG per tablet 90 tablet 1 6/30/2025 -- No   Sig - Route: Take 3 tablets by mouth daily (with breakfast). - Oral   Sent to pharmacy as: Glecaprevir-Pibrentasvir 100-40 MG Oral Tablet (MAVYRET)   Class: E-Prescribe   Order: [id number]   E-Prescribing Status: Receipt confirmed by pharmacy (6/30/2025 12:58 PM CDT)     Pharmacy    [id number] - [place], [place] - [address]     Associated Diagnoses    Chronic hepatitis C without hepatic coma (H) [B18.2]  - Primary        Source Order Set    Order Set Name Order ID    [id number]       Prescribing Provider's NPI: [id number]  Brigida Godinez

## 2025-07-09 ENCOUNTER — PATIENT OUTREACH (OUTPATIENT)
Dept: CARE COORDINATION | Facility: CLINIC | Age: 70
End: 2025-07-09
Payer: COMMERCIAL

## 2025-07-23 ENCOUNTER — PATIENT OUTREACH (OUTPATIENT)
Dept: CARE COORDINATION | Facility: CLINIC | Age: 70
End: 2025-07-23
Payer: COMMERCIAL

## 2025-07-29 ENCOUNTER — MYC REFILL (OUTPATIENT)
Dept: INTERNAL MEDICINE | Facility: CLINIC | Age: 70
End: 2025-07-29
Payer: COMMERCIAL

## 2025-07-29 DIAGNOSIS — M81.0 AGE-RELATED OSTEOPOROSIS WITHOUT CURRENT PATHOLOGICAL FRACTURE: ICD-10-CM

## 2025-07-30 RX ORDER — ALENDRONATE SODIUM 70 MG/1
70 TABLET ORAL
Qty: 12 TABLET | Refills: 2 | Status: SHIPPED | OUTPATIENT
Start: 2025-07-30

## 2025-07-30 NOTE — TELEPHONE ENCOUNTER
1/24/25 dexa scan result note by Dr. Godinez states:   The dexa scan or bone density scan showed improvement in the bone density from fosamax use . Please continue the fosamax till the 5 years are completed     Per Chart Review, it appears patient started alendronate in February 2021. Will confirm plan to continue until February 2026 to ensure too many refills are not provided.     TAM Mendoza   [facility]

## 2025-08-19 ENCOUNTER — MYC REFILL (OUTPATIENT)
Dept: INTERNAL MEDICINE | Facility: CLINIC | Age: 70
End: 2025-08-19
Payer: COMMERCIAL

## 2025-08-19 DIAGNOSIS — I10 BENIGN ESSENTIAL HTN: ICD-10-CM

## 2025-08-19 DIAGNOSIS — I25.10 ASCVD (ARTERIOSCLEROTIC CARDIOVASCULAR DISEASE): ICD-10-CM

## 2025-08-19 RX ORDER — AMLODIPINE BESYLATE 2.5 MG/1
2.5 TABLET ORAL DAILY
Qty: 90 TABLET | Refills: 3 | Status: CANCELLED | OUTPATIENT
Start: 2025-08-19

## 2025-08-19 RX ORDER — ATORVASTATIN CALCIUM 10 MG/1
10 TABLET, FILM COATED ORAL DAILY
Qty: 90 TABLET | Refills: 2 | Status: SHIPPED | OUTPATIENT
Start: 2025-08-19

## 2025-08-19 RX ORDER — AMLODIPINE BESYLATE 2.5 MG/1
2.5 TABLET ORAL DAILY
Qty: 90 TABLET | Refills: 0 | Status: SHIPPED | OUTPATIENT
Start: 2025-08-19